# Patient Record
Sex: MALE | Race: WHITE | NOT HISPANIC OR LATINO | Employment: UNEMPLOYED | URBAN - METROPOLITAN AREA
[De-identification: names, ages, dates, MRNs, and addresses within clinical notes are randomized per-mention and may not be internally consistent; named-entity substitution may affect disease eponyms.]

---

## 2019-01-14 ENCOUNTER — TELEPHONE (OUTPATIENT)
Dept: NEUROLOGY | Facility: CLINIC | Age: 39
End: 2019-01-14

## 2019-01-14 NOTE — TELEPHONE ENCOUNTER
----- Message from Mansi Ventura sent at 1/14/2019  2:45 PM CST -----  Contact: pt @ [phone]  Calling to schedule an appt with Dr. Bella. Please call.

## 2019-01-14 NOTE — TELEPHONE ENCOUNTER
Call returned to pt. States he has MS and is not on DMT--last neurology visit was over 3 years ago. Advised pt have most recent notes and MRI brain report faxed to this office. Once received will contact him with an appt. Fax number confirmed.

## 2019-01-30 ENCOUNTER — DOCUMENTATION ONLY (OUTPATIENT)
Dept: NEUROLOGY | Facility: CLINIC | Age: 39
End: 2019-01-30

## 2019-02-12 ENCOUNTER — OFFICE VISIT (OUTPATIENT)
Dept: NEUROLOGY | Facility: CLINIC | Age: 39
End: 2019-02-12
Payer: COMMERCIAL

## 2019-02-12 VITALS
WEIGHT: 178 LBS | HEIGHT: 73 IN | DIASTOLIC BLOOD PRESSURE: 77 MMHG | BODY MASS INDEX: 23.59 KG/M2 | HEART RATE: 59 BPM | SYSTOLIC BLOOD PRESSURE: 116 MMHG

## 2019-02-12 DIAGNOSIS — Z71.89 COUNSELING REGARDING GOALS OF CARE: ICD-10-CM

## 2019-02-12 DIAGNOSIS — R90.89 ABNORMAL FINDING ON MRI OF BRAIN: ICD-10-CM

## 2019-02-12 DIAGNOSIS — G35 MS (MULTIPLE SCLEROSIS): Primary | ICD-10-CM

## 2019-02-12 DIAGNOSIS — G81.90 HEMIPARESIS, UNSPECIFIED HEMIPARESIS ETIOLOGY, UNSPECIFIED LATERALITY: ICD-10-CM

## 2019-02-12 DIAGNOSIS — Z29.89 PROPHYLACTIC IMMUNOTHERAPY: ICD-10-CM

## 2019-02-12 DIAGNOSIS — D84.9 IMMUNOSUPPRESSION: ICD-10-CM

## 2019-02-12 DIAGNOSIS — R26.9 GAIT DISTURBANCE: ICD-10-CM

## 2019-02-12 DIAGNOSIS — G35 MULTIPLE SCLEROSIS EXACERBATION: ICD-10-CM

## 2019-02-12 PROCEDURE — 99999 PR PBB SHADOW E&M-EST. PATIENT-LVL III: CPT | Mod: PBBFAC,,, | Performed by: PSYCHIATRY & NEUROLOGY

## 2019-02-12 PROCEDURE — 99354 PR PROLONGED SVC, OUPT, 1ST HR: ICD-10-PCS | Mod: S$GLB,,, | Performed by: PSYCHIATRY & NEUROLOGY

## 2019-02-12 PROCEDURE — 3008F BODY MASS INDEX DOCD: CPT | Mod: CPTII,S$GLB,, | Performed by: PSYCHIATRY & NEUROLOGY

## 2019-02-12 PROCEDURE — 99205 OFFICE O/P NEW HI 60 MIN: CPT | Mod: S$GLB,,, | Performed by: PSYCHIATRY & NEUROLOGY

## 2019-02-12 PROCEDURE — 99354 PR PROLONGED SVC, OUPT, 1ST HR: CPT | Mod: S$GLB,,, | Performed by: PSYCHIATRY & NEUROLOGY

## 2019-02-12 PROCEDURE — 99205 PR OFFICE/OUTPT VISIT, NEW, LEVL V, 60-74 MIN: ICD-10-PCS | Mod: S$GLB,,, | Performed by: PSYCHIATRY & NEUROLOGY

## 2019-02-12 PROCEDURE — 3008F PR BODY MASS INDEX (BMI) DOCUMENTED: ICD-10-PCS | Mod: CPTII,S$GLB,, | Performed by: PSYCHIATRY & NEUROLOGY

## 2019-02-12 PROCEDURE — 99999 PR PBB SHADOW E&M-EST. PATIENT-LVL III: ICD-10-PCS | Mod: PBBFAC,,, | Performed by: PSYCHIATRY & NEUROLOGY

## 2019-02-12 RX ORDER — DEXAMETHASONE 6 MG/1
TABLET ORAL
Qty: 100 TABLET | Refills: 0 | Status: SHIPPED | OUTPATIENT
Start: 2019-02-12 | End: 2019-09-18 | Stop reason: ALTCHOICE

## 2019-02-12 NOTE — PROGRESS NOTES
"Neurology Consultation    Reason for consult:  MS         History of present illness:   Ms Hinds is a 37 y/o man who is self-referred for MS. He is accompanied by his father.      Pt states his first symptoms were in 2003. Thinks he was bitten by a tick.  States his legs gave out and he had paresthesia right arm. Intermittent over year.  Pt wonders whether he had Lyme.      In November 2009, he develop sudden and severe numbness in both feet and urinary retention.  He went to ER at one of the hospitals in [place], and was admitted for a week.  Had LP and MRIs of brain and spine. His recollection is that the LP was negative. He's not sure if he had lesions anywhere.  He was given diagnosis of TM.  He does not recall whether or not he was given steroids.  Pt remembers that his symptoms "slowly improved" to almost normal.     In December 2013, he developed pain behind the right eye.  He lost 100% vision in the right eye, and was told he had ON.  He does remember whether he was given steroids (but suspects).  He did not see neurologist at that time; two months after this, he developed acute urinary retention, and gait disturbance, so he went to ER again.   He was again admitted to the hospital ([facility] in ) and was there for a week.  At the end of this admission, he was told he had MS.  Two months later, he had another relapse of "total body numbness";  HE states he received steroids at that time.      He did see Denae Tellez in 2014, and actually started Tysabri.  He took one infusion of Tysabri; he felt depressed on this drug and severely fatigued;  He felt Tysabri gave him depression and suicidal thoughts.  He saw Dr Tellez again, and did FDO for Gilenya and he had several side effects, and never took another pill.  He's not been back to see any neurologist since then.      He's not had a relapse since December 2014.  He does admit that he's had a decline in his ability in his walk    His right side is " "weak (both arm and leg).  Walk with support of another person (at times); Wall walks;  Does not want to use a cane or a walker.      He's interested in natural treatments.  Did 21 days of water fasting;  Does dry fasting;  Does Keto diet at times. Also does intermittent fasting.   He's been doing these dietary interventions on and off since 2014.     Taking 10,000 IU of vitamin D3/ day.  CoQ10.   Fish oil. Tumeric.  B12.      He did "stem cell therapy" in [place] in October.   No immunosuppression associated with this.  Mesenchymal fat derived.       In the past, he's done a lot of acting.  Feels he should apply for SSDI.      Current symptoms:    1. Gait disturbance and imbalance  2. Numbness in hands, affects fine motor  3. Fatigue, severe  4. Memory issues  5. depression    Review of systems:   A complete 15 system ROS was completed by the patient, reviewed by me and will uploaded into the EHR.       Past Medical History:   Diagnosis Date    Multiple sclerosis     Transverse myelitis        History reviewed. No pertinent surgical history.    Family History   Problem Relation Age of Onset    Cancer Mother     Stroke Father     Cancer Father     Glaucoma Father     Cancer Maternal Grandmother     Cancer Maternal Grandfather        Social History     Socioeconomic History    Marital status: Single     Spouse name: None    Number of children: None    Years of education: None    Highest education level: None   Social Needs    Financial resource strain: None    Food insecurity - worry: None    Food insecurity - inability: None    Transportation needs - medical: None    Transportation needs - non-medical: None   Occupational History    None   Tobacco Use    Smoking status: None   Substance and Sexual Activity    Alcohol use: None    Drug use: None    Sexual activity: None   Other Topics Concern    None   Social History Narrative    None       MEDS: reviewed    Review of patient's allergies " "indicates:   Allergen Reactions    Cat/feline products          Physical Exam    Vitals:    02/12/19 1035   BP: 116/77   Pulse: (!) 59   Weight: 80.7 kg (178 lb)   Height: 6' 1" (1.854 m)     25 foot timed walk 12.0 seconds; favors right leg; no assist; very ataxic; spastic  In general, the patient is well nourished.    No bruits. He has disc pallor bilaterally    MENTAL STATUS: language is fluent, normal verbal comprehension, short-term and remote memory is intact, attention is normal, patient is alert and oriented x 3, fund of knowlege is appropriate by vocabulary.     CRANIAL NERVE EXAM:  There is no CAROL.  Extraocular muscles are intact. Pupils are equal, round, and reactive to light. No facial asymmetry. Facial sensation is intact bilaterally. There is no dysarthria. Uvula is midline, and palate moves symmetrically. Shoulder shrug intact bilaterlly. Tongue protrusion is midline. Hearing is grossly intact. Neck is supple.     MOTOR EXAM: increased tone in the LE bilaterally; slow RSM both hands, right> left;   4/5 extensors of the UE bilaterally  RLE: 2/5 HF, 4/5 KF, 4/5 DF;   LLE 4+/5 all flexors    REFLEXES: 3+ and symmetric right side; 2+ symmetric on the left side; toes silent bilaterally    SENSORY EXAM: decreased vibration all 4 limbs distally, worse in right side;     COORDINATION: mild dystaxia on FTN bilaterally    GAIT:  As above        IMAGING (personally reviewed):   MRI brain 2/2016 personally reviewed;  Mild to moderate burden; typical of MS; no obvious BS lesions;  Couple of punctate jooj+ lesions.             ASSESSMENT:        1. Multiple sclerosis   2. Gait disturbance  3. Fatigue  4. Depression             DISCUSSION:   The patient's exam shows a significant gait disturbance and right hemiparesis.  He has effectively never been on DMT (only 1 infusion of Tysabri and 1 tablet of Gilenya).  He reports gradual worsening of recent years, but also acute worsening in recent months. I'd like to " conduct MRIs of brain and spine ASAP to explore whether or not he has enhancing lesions, and to get a sense of his total disease burden.  I'll treat with 5 day course of pulse steroids (he chooses oral decadron); The rationale, side effects, risks and expectations of this medication was discussed.  Will also check vitamin D and screening labs and see patient back next week to discuss DMT.          Problem List Items Addressed This Visit     None      Visit Diagnoses     MS (multiple sclerosis)    -  Primary    Relevant Medications    dexamethasone (DECADRON) 6 MG tablet    Other Relevant Orders    CBC auto differential (Completed)    Comprehensive metabolic panel (Completed)    Vinson-Suppressor Ratio (Completed)    STRATIFY JCV ANTIBODY (with Index) (Completed)    Vitamin D (Completed)    MRI Brain Demyelinating W W/O Contrast (Completed)    MRI Cervical Spine Demyelinating W W/O Contrast (Completed)    MRI Thoracic Spine Demyelinating W W/O Contrast (Completed)    Multiple sclerosis exacerbation        Relevant Medications    dexamethasone (DECADRON) 6 MG tablet           Our visit today lasted 90 minutes, and 100% of this time was spent face to face with the patient. Over 50% of this visit included discussion of the treatment plan/medication changes/symptom management/exam findings/imaging results/coordination of care. The patient agrees with the plan of care.       Koki Bella MD, MPH

## 2019-02-14 ENCOUNTER — HOSPITAL ENCOUNTER (OUTPATIENT)
Dept: RADIOLOGY | Facility: HOSPITAL | Age: 39
Discharge: HOME | End: 2019-02-14
Attending: PSYCHIATRY & NEUROLOGY
Payer: COMMERCIAL

## 2019-02-14 DIAGNOSIS — G35 MS (MULTIPLE SCLEROSIS): ICD-10-CM

## 2019-02-14 PROCEDURE — 72156 MRI CERVICAL SPINE DEMYELINATING W W/O CONTRAST: ICD-10-PCS | Mod: 26,,, | Performed by: RADIOLOGY

## 2019-02-14 PROCEDURE — A9585 GADOBUTROL INJECTION: HCPCS | Mod: PO | Performed by: PSYCHIATRY & NEUROLOGY

## 2019-02-14 PROCEDURE — 72157 MRI CHEST SPINE W/O & W/DYE: CPT | Mod: 26,,, | Performed by: RADIOLOGY

## 2019-02-14 PROCEDURE — 25500020 PHARM REV CODE 255: Mod: PO | Performed by: PSYCHIATRY & NEUROLOGY

## 2019-02-14 PROCEDURE — 72157 MRI THORACIC SPINE DEMYELINATING W W/O CONTRAST: ICD-10-PCS | Mod: 26,,, | Performed by: RADIOLOGY

## 2019-02-14 PROCEDURE — 72156 MRI NECK SPINE W/O & W/DYE: CPT | Mod: TC,PO

## 2019-02-14 PROCEDURE — 70553 MRI BRAIN STEM W/O & W/DYE: CPT | Mod: TC,PO

## 2019-02-14 PROCEDURE — 70553 MRI BRAIN DEMYELINATING W/ WO CONTRAST: ICD-10-PCS | Mod: 26,,, | Performed by: RADIOLOGY

## 2019-02-14 PROCEDURE — 72157 MRI CHEST SPINE W/O & W/DYE: CPT | Mod: TC,PO

## 2019-02-14 PROCEDURE — 70553 MRI BRAIN STEM W/O & W/DYE: CPT | Mod: 26,,, | Performed by: RADIOLOGY

## 2019-02-14 PROCEDURE — 72156 MRI NECK SPINE W/O & W/DYE: CPT | Mod: 26,,, | Performed by: RADIOLOGY

## 2019-02-14 RX ORDER — GADOBUTROL 604.72 MG/ML
8 INJECTION INTRAVENOUS
Status: COMPLETED | OUTPATIENT
Start: 2019-02-14 | End: 2019-02-14

## 2019-02-14 RX ADMIN — GADOBUTROL 8 ML: 604.72 INJECTION INTRAVENOUS at 01:02

## 2019-02-19 PROBLEM — R90.89 ABNORMAL FINDING ON MRI OF BRAIN: Status: ACTIVE | Noted: 2019-02-19

## 2019-02-19 PROBLEM — D84.9 IMMUNOSUPPRESSION: Status: ACTIVE | Noted: 2019-02-19

## 2019-02-19 PROBLEM — G81.90 HEMIPARESIS: Status: ACTIVE | Noted: 2019-02-19

## 2019-02-19 PROBLEM — G35 MS (MULTIPLE SCLEROSIS): Status: ACTIVE | Noted: 2019-02-19

## 2019-02-19 PROBLEM — R26.9 GAIT DISTURBANCE: Status: ACTIVE | Noted: 2019-02-19

## 2019-02-19 PROBLEM — Z29.89 PROPHYLACTIC IMMUNOTHERAPY: Status: ACTIVE | Noted: 2019-02-19

## 2019-02-20 ENCOUNTER — OFFICE VISIT (OUTPATIENT)
Dept: NEUROLOGY | Facility: CLINIC | Age: 39
End: 2019-02-20
Payer: COMMERCIAL

## 2019-02-20 VITALS
HEIGHT: 73 IN | SYSTOLIC BLOOD PRESSURE: 122 MMHG | TEMPERATURE: 73 F | BODY MASS INDEX: 24.39 KG/M2 | DIASTOLIC BLOOD PRESSURE: 80 MMHG | WEIGHT: 184 LBS

## 2019-02-20 DIAGNOSIS — Z29.89 PROPHYLACTIC IMMUNOTHERAPY: ICD-10-CM

## 2019-02-20 DIAGNOSIS — R26.9 GAIT DISTURBANCE: ICD-10-CM

## 2019-02-20 DIAGNOSIS — G35 MS (MULTIPLE SCLEROSIS): Primary | ICD-10-CM

## 2019-02-20 DIAGNOSIS — Z71.89 COUNSELING REGARDING GOALS OF CARE: ICD-10-CM

## 2019-02-20 PROCEDURE — 99999 PR PBB SHADOW E&M-EST. PATIENT-LVL III: ICD-10-PCS | Mod: PBBFAC,,, | Performed by: PSYCHIATRY & NEUROLOGY

## 2019-02-20 PROCEDURE — 3008F BODY MASS INDEX DOCD: CPT | Mod: CPTII,S$GLB,, | Performed by: PSYCHIATRY & NEUROLOGY

## 2019-02-20 PROCEDURE — 99999 PR PBB SHADOW E&M-EST. PATIENT-LVL III: CPT | Mod: PBBFAC,,, | Performed by: PSYCHIATRY & NEUROLOGY

## 2019-02-20 PROCEDURE — 99215 OFFICE O/P EST HI 40 MIN: CPT | Mod: S$GLB,,, | Performed by: PSYCHIATRY & NEUROLOGY

## 2019-02-20 PROCEDURE — 99215 PR OFFICE/OUTPT VISIT, EST, LEVL V, 40-54 MIN: ICD-10-PCS | Mod: S$GLB,,, | Performed by: PSYCHIATRY & NEUROLOGY

## 2019-02-20 PROCEDURE — 3008F PR BODY MASS INDEX (BMI) DOCUMENTED: ICD-10-PCS | Mod: CPTII,S$GLB,, | Performed by: PSYCHIATRY & NEUROLOGY

## 2019-02-20 NOTE — PROGRESS NOTES
"Subjective:       Patient ID: Larry Hinds is a 38 y.o. male who presents today for a routine clinic visit for MS.  Pt is accompanied by his father    MS HPI:  · DMT: None  · Taking vitamin D3 as recommended?   · Has taken 4 of the 5 doses of the steroids; having insomnia; maybe is experiencing the improvement in walking;  Feels its due to "DMSO" cream that he started taking a few days prior to the decadron.     SOCIAL HISTORY  Social History     Tobacco Use    Smoking status: Never Smoker    Smokeless tobacco: Never Used   Substance Use Topics    Alcohol use: No     Frequency: Never    Drug use: No     Living arrangements - the patient lives alone.  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  No new or enhancing lesions to suggest active disease.      Electronically signed by: Girma Higginbotham MD  Date:    02/14/2019  Time:    14:18     Results for orders placed during the hospital encounter of 02/14/19   MRI Cervical Spine Demyelinating W W/O Contrast    Impression Multifocal signal abnormality throughout the length of the cervical and upper thoracic cord, in keeping with demyelinating plaque in this patient with known history of demyelinating disease.  Some cord atrophy also noted from the levels of C3-4 through C4-5.  No abnormal enhancement to suggest active demyelination.    Multilevel degenerative disc disease contributing to multilevel spinal canal or neural foraminal stenosis, most severe at the levels of C5-6 and C6-7.      Electronically signed by: Girma Higginbotham MD  Date:    02/14/2019  Time:    14:49     Results " for orders placed during the hospital encounter of 02/14/19   MRI Thoracic Spine Demyelinating W W/O Contrast    Impression Multifocal signal abnormality throughout the thoracic cord, in keeping with demyelinating plaque in this patient with known history of demyelinating disease.    2 small foci of enhancement noted at the level of T6 and T6-7, concerning for active sites of demyelination.      Electronically signed by: Girma Higginbotham MD  Date:    02/14/2019  Time:    15:08         Labs:     Lab Results   Component Value Date    NTZWSDWJ43VH 50 02/12/2019     Lab Results   Component Value Date    JCVINDEX 2.91 (A) 02/12/2019    JCVANTIBODY Positive (A) 02/12/2019     Lab Results   Component Value Date    YY1WKRRX 73.0 02/12/2019    ABSOLUTECD3 1547 02/12/2019    MI7ANCDF 21.9 02/12/2019    ABSOLUTECD8 465 02/12/2019    VG3QMCWS 46.9 02/12/2019    ABSOLUTECD4 994 02/12/2019    LABCD48 2.14 02/12/2019     Lab Results   Component Value Date    WBC 6.55 02/12/2019    RBC 5.31 02/12/2019    HGB 15.9 02/12/2019    HCT 46.6 02/12/2019    MCV 88 02/12/2019    MCH 29.9 02/12/2019    MCHC 34.1 02/12/2019    RDW 11.3 (L) 02/12/2019     02/12/2019    MPV 10.0 02/12/2019    GRAN 3.6 02/12/2019    GRAN 54.5 02/12/2019    LYMPH 2.0 02/12/2019    LYMPH 30.1 02/12/2019    MONO 0.5 02/12/2019    MONO 7.9 02/12/2019    EOS 0.4 02/12/2019    BASO 0.09 02/12/2019    EOSINOPHIL 5.8 02/12/2019    BASOPHIL 1.4 02/12/2019     Sodium   Date Value Ref Range Status   02/12/2019 140 136 - 145 mmol/L Final     Potassium   Date Value Ref Range Status   02/12/2019 4.2 3.5 - 5.1 mmol/L Final     Chloride   Date Value Ref Range Status   02/12/2019 102 95 - 110 mmol/L Final     CO2   Date Value Ref Range Status   02/12/2019 28 23 - 29 mmol/L Final     Glucose   Date Value Ref Range Status   02/12/2019 97 70 - 110 mg/dL Final     BUN, Bld   Date Value Ref Range Status   02/12/2019 17 6 - 20 mg/dL Final     Creatinine   Date Value Ref Range  Status   02/12/2019 1.2 0.5 - 1.4 mg/dL Final     Calcium   Date Value Ref Range Status   02/12/2019 10.2 8.7 - 10.5 mg/dL Final     Total Protein   Date Value Ref Range Status   02/12/2019 8.3 6.0 - 8.4 g/dL Final     Albumin   Date Value Ref Range Status   02/12/2019 4.5 3.5 - 5.2 g/dL Final     Total Bilirubin   Date Value Ref Range Status   02/12/2019 0.6 0.1 - 1.0 mg/dL Final     Comment:     For infants and newborns, interpretation of results should be based  on gestational age, weight and in agreement with clinical  observations.  Premature Infant recommended reference ranges:  Up to 24 hours.............<8.0 mg/dL  Up to 48 hours............<12.0 mg/dL  3-5 days..................<15.0 mg/dL  6-29 days.................<15.0 mg/dL       Alkaline Phosphatase   Date Value Ref Range Status   02/12/2019 58 55 - 135 U/L Final     AST   Date Value Ref Range Status   02/12/2019 13 10 - 40 U/L Final     ALT   Date Value Ref Range Status   02/12/2019 16 10 - 44 U/L Final     Anion Gap   Date Value Ref Range Status   02/12/2019 10 8 - 16 mmol/L Final     eGFR if    Date Value Ref Range Status   02/12/2019 >60.0 >60 mL/min/1.73 m^2 Final     eGFR if non    Date Value Ref Range Status   02/12/2019 >60.0 >60 mL/min/1.73 m^2 Final     Comment:     Calculation used to obtain the estimated glomerular filtration  rate (eGFR) is the CKD-EPI equation.            Diagnosis/Assessment/Plan:    1. Multiple Sclerosis  · Assessment: MRIs show two enhancing lesions in T spine; Gait much improved after steroid  · Imaging: will be planned 6 mo after start of Ocrevus  · Disease Modifying Therapies: after shared decision making, will start Ocrevus; The rationale, side effects, risks and expectations of this medication was discussed, and pt expresses understanding.  He refuses recommendation to get vaccines from ID but is willing to get screening testing for titers. The patient was counseled about the risks  associated with immune suppressive therapy, including a higher risk of serious infections and malignancy, as well as the importance of avoiding all live virus vaccines while on immune suppressive medication.  Will dose in [place].  Pt advised to take 3K/day of vitamin D3; The the patient was counseled about the importance of vitamin D supplementation in multiple sclerosis, and the fact that recent data suggests that high circulating blood levels of vitamin D has an ameliorating effect on the disease course in multiple sclerosis in general.      2. MS Symptom Assessment / Management  · Gait Disturbance:  PT ordered at [facility] in [place]    · Adaptive needs: I've reached out to our LCSW Suri Flores to connect with patient about his disability status    F/u 10 week with Nelly Fernandez CNS      Over 50% of this 40 minute visit was spent in direct face to face counseling of the patient about MS, DMT considerations, and MS symptom management.       Problem List Items Addressed This Visit        1 - High    MS (multiple sclerosis) - Primary    Relevant Orders    Ambulatory Referral to Physical/Occupational Therapy    Hepatitis B surface antibody    Hepatitis A antibody, IgG    Hepatitis C antibody    Hepatitis B surface antigen    HIV 1/2 Ag/Ab (4th Gen)    Quantiferon Gold TB    RPR    Varicella zoster antibody, IgG    Hepatitis B surface antibody    STRONGYLOIDES IGG ANTIBODIES       3     Gait disturbance    Relevant Orders    Ambulatory Referral to Physical/Occupational Therapy

## 2019-02-20 NOTE — Clinical Note
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 thanks"

## 2019-02-20 NOTE — Clinical Note
Team, new pt to our clinic; starting Ocrevus in [place];  He defers vaccines; checking Hep B today;  Kristin Nicholas;  He signed paperwork;

## 2019-02-21 ENCOUNTER — LAB VISIT (OUTPATIENT)
Dept: LAB | Facility: HOSPITAL | Age: 39
End: 2019-02-21
Attending: PSYCHIATRY & NEUROLOGY
Payer: COMMERCIAL

## 2019-02-21 DIAGNOSIS — G35 MS (MULTIPLE SCLEROSIS): ICD-10-CM

## 2019-02-21 PROCEDURE — 86682 HELMINTH ANTIBODY: CPT

## 2019-02-21 PROCEDURE — 86592 SYPHILIS TEST NON-TREP QUAL: CPT

## 2019-02-21 PROCEDURE — 86703 HIV-1/HIV-2 1 RESULT ANTBDY: CPT

## 2019-02-21 PROCEDURE — 86787 VARICELLA-ZOSTER ANTIBODY: CPT

## 2019-02-21 PROCEDURE — 86706 HEP B SURFACE ANTIBODY: CPT

## 2019-02-21 PROCEDURE — 86790 VIRUS ANTIBODY NOS: CPT

## 2019-02-21 PROCEDURE — 86803 HEPATITIS C AB TEST: CPT

## 2019-02-21 PROCEDURE — 87340 HEPATITIS B SURFACE AG IA: CPT

## 2019-02-22 LAB
HBV SURFACE AB SER-ACNC: NEGATIVE M[IU]/ML
HBV SURFACE AB SER-ACNC: NEGATIVE M[IU]/ML
HBV SURFACE AG SERPL QL IA: NEGATIVE
HCV AB SERPL QL IA: NEGATIVE
HEPATITIS A ANTIBODY, IGG: NEGATIVE
HIV 1+2 AB+HIV1 P24 AG SERPL QL IA: NEGATIVE
RPR SER QL: NORMAL

## 2019-02-23 LAB
VARICELLA INTERPRETATION: POSITIVE
VARICELLA ZOSTER IGG: 2.23 ISR

## 2019-02-25 LAB — STRONGYLOIDES ANTIBODY IGG: NEGATIVE

## 2019-02-26 ENCOUNTER — TELEPHONE (OUTPATIENT)
Dept: NEUROLOGY | Facility: CLINIC | Age: 39
End: 2019-02-26

## 2019-02-26 DIAGNOSIS — Z79.899 HIGH RISK MEDICATION USE: ICD-10-CM

## 2019-02-26 DIAGNOSIS — G35 MULTIPLE SCLEROSIS: Primary | ICD-10-CM

## 2019-02-26 NOTE — TELEPHONE ENCOUNTER
Call received from lab. TB gold must be re-drawn--tube was under filled and unable to be processed.

## 2019-02-28 ENCOUNTER — TELEPHONE (OUTPATIENT)
Dept: NEUROLOGY | Facility: CLINIC | Age: 39
End: 2019-02-28

## 2019-02-28 ENCOUNTER — PATIENT MESSAGE (OUTPATIENT)
Dept: NEUROLOGY | Facility: CLINIC | Age: 39
End: 2019-02-28

## 2019-02-28 ENCOUNTER — LAB VISIT (OUTPATIENT)
Dept: LAB | Facility: HOSPITAL | Age: 39
End: 2019-02-28
Attending: CLINICAL NURSE SPECIALIST
Payer: COMMERCIAL

## 2019-02-28 ENCOUNTER — OFFICE VISIT (OUTPATIENT)
Dept: INTERNAL MEDICINE | Facility: CLINIC | Age: 39
End: 2019-02-28
Payer: COMMERCIAL

## 2019-02-28 VITALS
SYSTOLIC BLOOD PRESSURE: 110 MMHG | WEIGHT: 177 LBS | TEMPERATURE: 97 F | RESPIRATION RATE: 18 BRPM | BODY MASS INDEX: 23.36 KG/M2 | OXYGEN SATURATION: 97 % | HEART RATE: 87 BPM | DIASTOLIC BLOOD PRESSURE: 80 MMHG

## 2019-02-28 DIAGNOSIS — R26.9 IMPAIRED GAIT: Primary | ICD-10-CM

## 2019-02-28 DIAGNOSIS — R29.898 RIGHT LEG WEAKNESS: ICD-10-CM

## 2019-02-28 DIAGNOSIS — G35 MULTIPLE SCLEROSIS: ICD-10-CM

## 2019-02-28 DIAGNOSIS — Z79.899 HIGH RISK MEDICATION USE: ICD-10-CM

## 2019-02-28 DIAGNOSIS — Z76.89 ENCOUNTER TO ESTABLISH CARE: Primary | ICD-10-CM

## 2019-02-28 DIAGNOSIS — Z13.220 SCREENING FOR HYPERLIPIDEMIA: ICD-10-CM

## 2019-02-28 DIAGNOSIS — G35 MS (MULTIPLE SCLEROSIS): ICD-10-CM

## 2019-02-28 PROCEDURE — 99999 PR PBB SHADOW E&M-EST. PATIENT-LVL III: ICD-10-PCS | Mod: PBBFAC,,, | Performed by: FAMILY MEDICINE

## 2019-02-28 PROCEDURE — 3008F BODY MASS INDEX DOCD: CPT | Mod: CPTII,S$GLB,, | Performed by: FAMILY MEDICINE

## 2019-02-28 PROCEDURE — 99999 PR PBB SHADOW E&M-EST. PATIENT-LVL III: CPT | Mod: PBBFAC,,, | Performed by: FAMILY MEDICINE

## 2019-02-28 PROCEDURE — 99203 PR OFFICE/OUTPT VISIT, NEW, LEVL III, 30-44 MIN: ICD-10-PCS | Mod: S$GLB,,, | Performed by: FAMILY MEDICINE

## 2019-02-28 PROCEDURE — 36415 COLL VENOUS BLD VENIPUNCTURE: CPT

## 2019-02-28 PROCEDURE — 86480 TB TEST CELL IMMUN MEASURE: CPT

## 2019-02-28 PROCEDURE — 99203 OFFICE O/P NEW LOW 30 MIN: CPT | Mod: S$GLB,,, | Performed by: FAMILY MEDICINE

## 2019-02-28 PROCEDURE — 3008F PR BODY MASS INDEX (BMI) DOCUMENTED: ICD-10-PCS | Mod: CPTII,S$GLB,, | Performed by: FAMILY MEDICINE

## 2019-02-28 RX ORDER — IBUPROFEN 100 MG/5ML
1000 SUSPENSION, ORAL (FINAL DOSE FORM) ORAL
COMMUNITY

## 2019-02-28 RX ORDER — MELATONIN 10 MG
TABLET, SUBLINGUAL SUBLINGUAL
COMMUNITY

## 2019-02-28 RX ORDER — LANOLIN ALCOHOL/MO/W.PET/CERES
1000 CREAM (GRAM) TOPICAL
COMMUNITY
End: 2023-01-06

## 2019-02-28 RX ORDER — MULTIVITAMIN
1 TABLET ORAL
COMMUNITY

## 2019-02-28 RX ORDER — OMEGA-3-ACID ETHYL ESTERS 1 G/1
1 CAPSULE, LIQUID FILLED ORAL
COMMUNITY

## 2019-02-28 NOTE — PROGRESS NOTES
Subjective:       Patient ID: Larry Hinds is a 38 y.o. male.    Chief Complaint: Establish Care    HPI      37 yo M    MS  Transverse myelitis - 2009    Neurologist -   Katerine Hawkins - [facility].    Has seen a Stem Cell MD  In [place] - Dr. Caro Talbot     Never smoked  Alcohol - occasional  No drugs    Working to get on disability.    Reviewed labs        F.H.  Cancer  - breast, leukemia ( uncertain ), esophageal - asbestosis  Father - Stroke in Sept / CAD -   Mother - Breast Cancer    Review of Systems   Constitutional: Negative for activity change.   Eyes: Negative for discharge.   Respiratory: Negative for wheezing.    Cardiovascular: Negative for chest pain and palpitations.   Gastrointestinal: Negative for constipation, diarrhea and vomiting.   Genitourinary: Positive for difficulty urinating. Negative for hematuria.   Neurological: Positive for headaches.   Psychiatric/Behavioral: Positive for dysphoric mood.       Objective:       Vitals:    02/28/19 1116   BP: 110/80   Pulse: 87   Resp: 18   Temp: 97 °F (36.1 °C)       Physical Exam   Constitutional: He is oriented to person, place, and time. He appears well-developed and well-nourished. No distress.   HENT:   Head: Normocephalic and atraumatic.   Right Ear: Hearing, tympanic membrane, external ear and ear canal normal.   Left Ear: Hearing, tympanic membrane, external ear and ear canal normal.   Nose: Nose normal. Right sinus exhibits no maxillary sinus tenderness and no frontal sinus tenderness. Left sinus exhibits no maxillary sinus tenderness and no frontal sinus tenderness.   Mouth/Throat: Uvula is midline, oropharynx is clear and moist and mucous membranes are normal.   Eyes: Conjunctivae are normal. Right eye exhibits no discharge. Left eye exhibits no discharge.   Neck: Trachea normal, normal range of motion and full passive range of motion without pain.   Pulmonary/Chest: Effort normal and breath sounds normal. No respiratory distress. He has no  decreased breath sounds. He has no wheezes.   Abdominal: Normal appearance.   Musculoskeletal: Normal range of motion. He exhibits no edema or deformity.   In wheelchair   Neurological: He is alert and oriented to person, place, and time.   Skin: Skin is warm, dry and intact. No rash noted. No erythema. No pallor.   Psychiatric: He has a normal mood and affect. His speech is normal and behavior is normal. Thought content normal.       Assessment:       1. Encounter to establish care    2. MS (multiple sclerosis)    3. Screening for hyperlipidemia        Plan:   Encounter to establish care    Reviewed labs  Discussed treatment plans extensively  Concerned about mood  Offered to return if wants to further discuss  Declines psych for now    MS  Following Neurology  Upcoming infusion therapy         Screening for HLD  Lipid panel    6 mo f/u     No Follow-up on file.

## 2019-03-01 ENCOUNTER — TELEPHONE (OUTPATIENT)
Dept: PSYCHIATRY | Facility: CLINIC | Age: 39
End: 2019-03-01

## 2019-03-04 ENCOUNTER — TELEPHONE (OUTPATIENT)
Dept: NEUROLOGY | Facility: CLINIC | Age: 39
End: 2019-03-04

## 2019-03-04 DIAGNOSIS — G35 MS (MULTIPLE SCLEROSIS): Primary | ICD-10-CM

## 2019-03-04 LAB
M TB IFN-G CD4+ BCKGRND COR BLD-ACNC: 0.03 IU/ML
MITOGEN IGNF BCKGRD COR BLD-ACNC: >10 IU/ML
MITOGEN IGNF BCKGRD COR BLD-ACNC: NEGATIVE [IU]/ML
NIL: 0.05 IU/ML
TB2 - NIL: 0.04 IU/ML

## 2019-03-06 ENCOUNTER — TELEPHONE (OUTPATIENT)
Dept: PSYCHIATRY | Facility: CLINIC | Age: 39
End: 2019-03-06

## 2019-03-06 ENCOUNTER — PATIENT MESSAGE (OUTPATIENT)
Dept: NEUROLOGY | Facility: CLINIC | Age: 39
End: 2019-03-06

## 2019-03-07 ENCOUNTER — TELEPHONE (OUTPATIENT)
Dept: PSYCHIATRY | Facility: CLINIC | Age: 39
End: 2019-03-07

## 2019-03-07 NOTE — TELEPHONE ENCOUNTER
Spoke with pt by phone.  We scheduled a phone call next week 3/13/19 at 2pm to complete his SSA disability application.  SW spoke with pt about Medicaid and [employer] [place] but he did not seem interested in either despite having no income.

## 2019-03-08 ENCOUNTER — PATIENT MESSAGE (OUTPATIENT)
Dept: NEUROLOGY | Facility: CLINIC | Age: 39
End: 2019-03-08

## 2019-03-08 RX ORDER — FAMOTIDINE 10 MG/ML
20 INJECTION INTRAVENOUS
Status: CANCELLED | OUTPATIENT
Start: 2019-03-08

## 2019-03-08 RX ORDER — ACETAMINOPHEN 325 MG/1
1000 TABLET ORAL
Status: CANCELLED | OUTPATIENT
Start: 2019-03-08

## 2019-03-08 RX ORDER — HEPARIN 100 UNIT/ML
100 SYRINGE INTRAVENOUS
Status: CANCELLED | OUTPATIENT
Start: 2019-03-08

## 2019-03-08 RX ORDER — SODIUM CHLORIDE 0.9 % (FLUSH) 0.9 %
10 SYRINGE (ML) INJECTION
Status: CANCELLED | OUTPATIENT
Start: 2019-03-08

## 2019-03-12 ENCOUNTER — LAB VISIT (OUTPATIENT)
Dept: LAB | Facility: HOSPITAL | Age: 39
End: 2019-03-12
Attending: PSYCHIATRY & NEUROLOGY
Payer: COMMERCIAL

## 2019-03-12 DIAGNOSIS — G35 MS (MULTIPLE SCLEROSIS): ICD-10-CM

## 2019-03-12 PROCEDURE — 86704 HEP B CORE ANTIBODY TOTAL: CPT

## 2019-03-12 PROCEDURE — 36415 COLL VENOUS BLD VENIPUNCTURE: CPT

## 2019-03-13 ENCOUNTER — TELEPHONE (OUTPATIENT)
Dept: PSYCHIATRY | Facility: CLINIC | Age: 39
End: 2019-03-13

## 2019-03-13 LAB — HBV CORE AB SERPL QL IA: NEGATIVE

## 2019-03-13 NOTE — TELEPHONE ENCOUNTER
Assisted pt by phone to complete a portion of his online SSDI/SSI application.  We spent ~ 2 hours by phone and will resume our efforts on Monday, 3/18 at 2pm.

## 2019-03-18 ENCOUNTER — TELEPHONE (OUTPATIENT)
Dept: PSYCHIATRY | Facility: CLINIC | Age: 39
End: 2019-03-18

## 2019-03-18 NOTE — TELEPHONE ENCOUNTER
Assisted pt by phone to complete his SSDI/SSI application online.  Mailed copy to patient with instructions on next steps.

## 2019-03-19 ENCOUNTER — PATIENT MESSAGE (OUTPATIENT)
Dept: NEUROLOGY | Facility: CLINIC | Age: 39
End: 2019-03-19

## 2019-03-26 ENCOUNTER — INFUSION (OUTPATIENT)
Dept: INFUSION THERAPY | Facility: HOSPITAL | Age: 39
End: 2019-03-26
Attending: PSYCHIATRY & NEUROLOGY
Payer: COMMERCIAL

## 2019-03-26 ENCOUNTER — DOCUMENTATION ONLY (OUTPATIENT)
Dept: HEMATOLOGY/ONCOLOGY | Facility: CLINIC | Age: 39
End: 2019-03-26

## 2019-03-26 VITALS
BODY MASS INDEX: 23.86 KG/M2 | OXYGEN SATURATION: 98 % | DIASTOLIC BLOOD PRESSURE: 61 MMHG | HEART RATE: 76 BPM | WEIGHT: 180 LBS | HEIGHT: 73 IN | TEMPERATURE: 97 F | SYSTOLIC BLOOD PRESSURE: 110 MMHG | RESPIRATION RATE: 18 BRPM

## 2019-03-26 DIAGNOSIS — G35 MS (MULTIPLE SCLEROSIS): Primary | ICD-10-CM

## 2019-03-26 PROCEDURE — 25000003 PHARM REV CODE 250: Performed by: PSYCHIATRY & NEUROLOGY

## 2019-03-26 PROCEDURE — 96367 TX/PROPH/DG ADDL SEQ IV INF: CPT

## 2019-03-26 PROCEDURE — 63600175 PHARM REV CODE 636 W HCPCS: Performed by: PSYCHIATRY & NEUROLOGY

## 2019-03-26 PROCEDURE — 96366 THER/PROPH/DIAG IV INF ADDON: CPT

## 2019-03-26 PROCEDURE — 96375 TX/PRO/DX INJ NEW DRUG ADDON: CPT

## 2019-03-26 PROCEDURE — S0028 INJECTION, FAMOTIDINE, 20 MG: HCPCS | Performed by: PSYCHIATRY & NEUROLOGY

## 2019-03-26 PROCEDURE — 96365 THER/PROPH/DIAG IV INF INIT: CPT

## 2019-03-26 RX ORDER — FAMOTIDINE 10 MG/ML
20 INJECTION INTRAVENOUS
Status: CANCELLED | OUTPATIENT
Start: 2019-03-26

## 2019-03-26 RX ORDER — METHYLPREDNISOLONE SOD SUCC 125 MG
100 VIAL (EA) INJECTION
Status: CANCELLED
Start: 2019-03-26

## 2019-03-26 RX ORDER — HEPARIN 100 UNIT/ML
100 SYRINGE INTRAVENOUS
Status: CANCELLED | OUTPATIENT
Start: 2019-03-26

## 2019-03-26 RX ORDER — FAMOTIDINE 10 MG/ML
20 INJECTION INTRAVENOUS
Status: COMPLETED | OUTPATIENT
Start: 2019-03-26 | End: 2019-03-26

## 2019-03-26 RX ORDER — METHYLPREDNISOLONE SOD SUCC 125 MG
100 VIAL (EA) INJECTION
Status: COMPLETED | OUTPATIENT
Start: 2019-03-26 | End: 2019-03-26

## 2019-03-26 RX ORDER — SODIUM CHLORIDE 0.9 % (FLUSH) 0.9 %
10 SYRINGE (ML) INJECTION
Status: CANCELLED | OUTPATIENT
Start: 2019-03-26

## 2019-03-26 RX ORDER — ACETAMINOPHEN 500 MG
1000 TABLET ORAL
Status: CANCELLED | OUTPATIENT
Start: 2019-03-26

## 2019-03-26 RX ORDER — ACETAMINOPHEN 500 MG
1000 TABLET ORAL
Status: COMPLETED | OUTPATIENT
Start: 2019-03-26 | End: 2019-03-26

## 2019-03-26 RX ADMIN — DIPHENHYDRAMINE HYDROCHLORIDE 50 MG: 50 INJECTION, SOLUTION INTRAMUSCULAR; INTRAVENOUS at 10:03

## 2019-03-26 RX ADMIN — FAMOTIDINE 20 MG: 10 INJECTION, SOLUTION INTRAVENOUS at 10:03

## 2019-03-26 RX ADMIN — METHYLPREDNISOLONE SODIUM SUCCINATE 100 MG: 125 INJECTION, POWDER, FOR SOLUTION INTRAMUSCULAR; INTRAVENOUS at 10:03

## 2019-03-26 RX ADMIN — OCRELIZUMAB 300 MG: 300 INJECTION INTRAVENOUS at 11:03

## 2019-03-26 RX ADMIN — ACETAMINOPHEN 1000 MG: 500 TABLET ORAL at 10:03

## 2019-03-26 NOTE — PLAN OF CARE
Problem: Fall Injury Risk  Goal: Absence of Fall and Fall-Related Injury    Intervention: Promote Injury-Free Environment  Pt cautious upon ambulation and instructed to call for assistance when needed

## 2019-03-26 NOTE — PLAN OF CARE
"Problem: Adult Inpatient Plan of Care  Goal: Plan of Care Review  Outcome: Ongoing (interventions implemented as appropriate)  Pt states, "I feel tired and I haven't felt my hands in years, I have lots of numbness and tingling"      "

## 2019-03-26 NOTE — DISCHARGE INSTRUCTIONS
.  [facility]  [address] [address]  [address]  [phone] phone     [phone] fax  Hours of Operation: Monday- Friday 8:00am- 5:00pm  After hours phone  [phone]  Hematology / Oncology Physicians on call      Dr. Fredo Farmer    Please call with any concerns regarding your appointment today.  .FALL PREVENTION   Falls often occur due to slipping, tripping or losing your balance. Here are ways to reduce your risk of falling again.   Was there anything that caused your fall that can be fixed, removed or replaced?   Make your home safe by keeping walkways clear of objects you may trip over.   Use non-slip pads under rugs.   Do not walk in poorly lit areas.   Do not stand on chairs or wobbly ladders.   Use caution when reaching overhead or looking upward. This position can cause a loss of balance.   Be sure your shoes fit properly, have non-slip bottoms and are in good condition.   Be cautious when going up and down stairs, curbs, and when walking on uneven sidewalks.   If your balance is poor, consider using a cane or walker.   If your fall was related to alcohol use, stop or limit alcohol intake.   If your fall was related to use of sleeping medicines, talk to your doctor about this. You may need to reduce your dosage at bedtime if you awaken during the night to go to the bathroom.   To reduce the need for nighttime bathroom trips:   Avoid drinking fluids for several hours before going to bed   Empty your bladder before going to bed   Men can keep a urinal at the bedside   © 2073-9933 [employer], [address] [place] [id number]. All rights reserved. This information is not intended as a substitute for professional medical care. Always follow your healthcare professional's instructions.    .WAYS TO HELP PREVENT INFECTION         WASH YOUR HANDS OFTEN DURING THE DAY, ESPECIALLY BEFORE YOU EAT, AFTER USING  "THE BATHROOM, AND AFTER TOUCHING ANIMALS     STAY AWAY FROM PEOPLE WHO HAVE ILLNESSES YOU CAN CATCH; SUCH AS COLDS, FLU, CHICKEN POX     TRY TO AVOID CROWDS     STAY AWAY FROM CHILDREN WHO RECENTLY HAVE RECEIVED LIVE VIRUS VACCINES     MAINTAIN GOOD MOUTH CARE     DO NOT SQUEEZE OR SCRATCH PIMPLES     CLEAN CUTS & SCRAPES RIGHT AWAY AND DAILY UNTIL HEALED WITH WARM WATER, SOAP & AN ANTISEPTIC     AVOID CONTACT WITH LITTER BOXES, BIRD CAGES, & FISH TANKS     AVOID STANDING WATER, IE., BIRD BATHS, FLOWER POTS/VASES, OR HUMIDIFIERS     WEAR GLOVES WHEN GARDENING OR CLEANING UP AFTER OTHERS, ESPECIALLY BABIES & SMALL CHILDREN     DO NOT EAT RAW FISH, SEAFOOD, MEAT, OR EGGS      .Support Groups/Classes    Support groups and classes are being offered at the   [facility] and [facility]!!    "Cooking with Cancer" (Nutrition Class):  Second Wednesday of each month   at noon at the [facility].  Metastatic Support Group:  Third Tuesday of each month   at noon at the [facility].  Next Steps Class/Group: Second and fourth Thursday of each month at noon at the [facility].  [facility] (Breast Cancer Support Group): First Tuesday of each month   at 5:30pm at the [facility] ER location.  [employer]: [facility]: Second and third Tuesday of each month from 7:30am - 2pm.  [facility]: First and fourth Tuesday of each month from 7:30am - 2pm    If you are interested in attending or would like more information please ask our social workers or your nurse!    "

## 2019-03-26 NOTE — PLAN OF CARE
Problem: Fall Injury Risk  Goal: Absence of Fall and Fall-Related Injury    Intervention: Identify and Manage Contributors to Fall Injury Risk  Reviewed fall risk associated with medications with pt

## 2019-03-26 NOTE — PROGRESS NOTES
SW received notification from infusion that pt reported feeling down and depressed nearly every day. SW attempted contact twice today during pt's infusion, but he was asleep both times. CAMERON left her contact info along with important numbers such as 9-1-1, [employer] [place] [phone]; Suicide Prevention Lifeline 6-449-102-TALK; Suicide Hotline 1-746.455.4265; Self-Injury Crisis Line 8-152-943-HELP. CAMERON also noticed pt had been contacted by LCSW in Psychiatry in [place]. SW will reach out to LCSW to make sure she is aware of pt's report today (in case she wants to reach out since they may have established a rapport). Cameron will f/u later this week depending if LCSW in [place] would prefer to address this concern with pt.

## 2019-03-26 NOTE — PLAN OF CARE
Problem: Adult Inpatient Plan of Care  Goal: Patient-Specific Goal (Individualization)  Outcome: Ongoing (interventions implemented as appropriate)  Pt likes miguel

## 2019-03-27 ENCOUNTER — TELEPHONE (OUTPATIENT)
Dept: PSYCHIATRY | Facility: CLINIC | Age: 39
End: 2019-03-27

## 2019-03-27 ENCOUNTER — PATIENT MESSAGE (OUTPATIENT)
Dept: PSYCHIATRY | Facility: CLINIC | Age: 39
End: 2019-03-27

## 2019-03-27 NOTE — TELEPHONE ENCOUNTER
Received message to contact Cathy Ruffin LMSW with [facility] team.  Reviewed her Epic note and left a message for her to call.  MS team is aware of pt's depression and is able to assist with referral to therapist/psychiatrist if pt is open to this.  Will await call back from Cathy with information on local resources that may be available to pt.

## 2019-04-08 ENCOUNTER — PATIENT MESSAGE (OUTPATIENT)
Dept: PSYCHIATRY | Facility: CLINIC | Age: 39
End: 2019-04-08

## 2019-04-09 ENCOUNTER — SOCIAL WORK (OUTPATIENT)
Dept: HEMATOLOGY/ONCOLOGY | Facility: CLINIC | Age: 39
End: 2019-04-09

## 2019-04-09 ENCOUNTER — INFUSION (OUTPATIENT)
Dept: INFUSION THERAPY | Facility: HOSPITAL | Age: 39
End: 2019-04-09
Attending: PSYCHIATRY & NEUROLOGY
Payer: COMMERCIAL

## 2019-04-09 VITALS
DIASTOLIC BLOOD PRESSURE: 63 MMHG | SYSTOLIC BLOOD PRESSURE: 96 MMHG | TEMPERATURE: 97 F | HEART RATE: 69 BPM | RESPIRATION RATE: 18 BRPM | OXYGEN SATURATION: 98 %

## 2019-04-09 DIAGNOSIS — G35 MS (MULTIPLE SCLEROSIS): Primary | ICD-10-CM

## 2019-04-09 PROCEDURE — 96367 TX/PROPH/DG ADDL SEQ IV INF: CPT

## 2019-04-09 PROCEDURE — S0028 INJECTION, FAMOTIDINE, 20 MG: HCPCS | Performed by: PSYCHIATRY & NEUROLOGY

## 2019-04-09 PROCEDURE — 25000003 PHARM REV CODE 250: Performed by: PSYCHIATRY & NEUROLOGY

## 2019-04-09 PROCEDURE — 96375 TX/PRO/DX INJ NEW DRUG ADDON: CPT

## 2019-04-09 PROCEDURE — 63600175 PHARM REV CODE 636 W HCPCS: Performed by: PSYCHIATRY & NEUROLOGY

## 2019-04-09 PROCEDURE — 96365 THER/PROPH/DIAG IV INF INIT: CPT

## 2019-04-09 PROCEDURE — 96366 THER/PROPH/DIAG IV INF ADDON: CPT

## 2019-04-09 RX ORDER — SODIUM CHLORIDE 0.9 % (FLUSH) 0.9 %
10 SYRINGE (ML) INJECTION
Status: CANCELLED | OUTPATIENT
Start: 2019-09-10

## 2019-04-09 RX ORDER — HEPARIN 100 UNIT/ML
100 SYRINGE INTRAVENOUS
Status: CANCELLED | OUTPATIENT
Start: 2019-09-10

## 2019-04-09 RX ORDER — ACETAMINOPHEN 500 MG
1000 TABLET ORAL
Status: COMPLETED | OUTPATIENT
Start: 2019-04-09 | End: 2019-04-09

## 2019-04-09 RX ORDER — FAMOTIDINE 10 MG/ML
20 INJECTION INTRAVENOUS
Status: CANCELLED | OUTPATIENT
Start: 2019-09-10

## 2019-04-09 RX ORDER — FAMOTIDINE 10 MG/ML
20 INJECTION INTRAVENOUS
Status: COMPLETED | OUTPATIENT
Start: 2019-04-09 | End: 2019-04-09

## 2019-04-09 RX ORDER — METHYLPREDNISOLONE SOD SUCC 125 MG
100 VIAL (EA) INJECTION
Status: COMPLETED | OUTPATIENT
Start: 2019-04-09 | End: 2019-04-09

## 2019-04-09 RX ORDER — SODIUM CHLORIDE 0.9 % (FLUSH) 0.9 %
10 SYRINGE (ML) INJECTION
Status: DISCONTINUED | OUTPATIENT
Start: 2019-04-09 | End: 2019-04-09 | Stop reason: HOSPADM

## 2019-04-09 RX ORDER — METHYLPREDNISOLONE SOD SUCC 125 MG
100 VIAL (EA) INJECTION
Status: CANCELLED
Start: 2019-09-10

## 2019-04-09 RX ORDER — ACETAMINOPHEN 500 MG
1000 TABLET ORAL
Status: CANCELLED | OUTPATIENT
Start: 2019-09-10

## 2019-04-09 RX ADMIN — OCRELIZUMAB 300 MG: 300 INJECTION INTRAVENOUS at 10:04

## 2019-04-09 RX ADMIN — FAMOTIDINE 20 MG: 10 INJECTION, SOLUTION INTRAVENOUS at 09:04

## 2019-04-09 RX ADMIN — ACETAMINOPHEN 1000 MG: 500 TABLET ORAL at 09:04

## 2019-04-09 RX ADMIN — DIPHENHYDRAMINE HYDROCHLORIDE 50 MG: 50 INJECTION, SOLUTION INTRAMUSCULAR; INTRAVENOUS at 09:04

## 2019-04-09 RX ADMIN — METHYLPREDNISOLONE SODIUM SUCCINATE 100 MG: 125 INJECTION, POWDER, FOR SOLUTION INTRAMUSCULAR; INTRAVENOUS at 09:04

## 2019-04-09 NOTE — PLAN OF CARE
Problem: Adult Inpatient Plan of Care  Goal: Plan of Care Review  Outcome: Ongoing (interventions implemented as appropriate)  Not really anything new since last infusion.

## 2019-04-12 NOTE — PROGRESS NOTES
SW met with pt in infusion as we were not able to meet the last time he was in clinic and reported depressive symptoms. Pt was alert and oriented. SW made introductions and explained purpose for the visit was that pt had indicated last infusion he was depressed nearly everyday, so SW wanted to check on him and provide resources. Pt indicated he was unsure if he received the paperwork left for him last time. Pt explained his depression comes in waves, but he does not enjoy talking about his feelings to others as he tries to remain positive. Pt indicated thoughts of suicide at times, but he denied any plan or intention to carry out any harm to himself. Pt indicated he is doing this current infusion for his father who asked him to try it to see if it gives him relief of his symptoms. Pt explained he has a burning sensation and he cannot use his hands or legs. Pt reported he used to run and loved being athletic, and he has trouble coping with not being able to do the things he once enjoyed. He is hopeful the treatment will work, but he is also open to other options if not. Pt mentioned looking into stem cell therapy and western medicine as pt believes the body can heal itself with proper nutritional, but he has not had the self-discipline it takes to achieve this type of diet. We talked about the importance of realistic goal setting and small steps to making big, lifestyle changes. Pt mentioned using social media, specifically [employer] videos of others with MS as inspiration. He explained he tries to remain as positive as possible because he feels the power of the mind is strong tool is healing the body. He also indicated he is Anabaptist and has andrew in a higher power as well. He mentioned having some Spiritism struggles in terms of understanding the purpose behind his disability. SW mentioned our  services within [facility], but pt declined this for now. Pt explained he has a great support system in his  family members, but he does not like to talk to others because he wants to look strong. MEAGAN talked about important of sharing thoughts and feelings and not holding on to these as they can manifest into additional physical symptoms. Pt thanked MEAGAN for visit and explained it is not typical for him to talk so much and it must be the medication (nurse indicated sometimes steroids make pts jittery and chattier than normal). Pt seemed in much better spirits at the end of our visit as evidenced by several jokes he shared with MEAGAN and his infusion nurse. MEAGAN provided pt with a list of 24/7 resources such as The Phone and the [facility] [place] suicide prevention hotline in the event he feels low and wants to reach out to talk to someone. MEAGAN also provided her contact info should he need further assistance or need to talk again. MEAGAN will f/u as requested/needed.

## 2019-04-16 ENCOUNTER — TELEPHONE (OUTPATIENT)
Dept: PSYCHIATRY | Facility: CLINIC | Age: 39
End: 2019-04-16

## 2019-04-16 NOTE — TELEPHONE ENCOUNTER
Spoke with pt by phone to work on work history form for SSDI application.  Pt reported he's doing fair and decided to spend Easter with some older friends.  We will talk on Monday to complete the functioning report for his claim.

## 2019-04-22 ENCOUNTER — TELEPHONE (OUTPATIENT)
Dept: PSYCHIATRY | Facility: CLINIC | Age: 39
End: 2019-04-22

## 2019-04-22 ENCOUNTER — PATIENT MESSAGE (OUTPATIENT)
Dept: PSYCHIATRY | Facility: CLINIC | Age: 39
End: 2019-04-22

## 2019-04-22 NOTE — TELEPHONE ENCOUNTER
Assisted pt by phone to complete the remainder of his work history report and adult function report for his SSDI claim.  Mailed original to [facility] in [place], copy to pt, and kept copy for records.

## 2019-05-03 ENCOUNTER — DOCUMENTATION ONLY (OUTPATIENT)
Dept: NEUROLOGY | Facility: CLINIC | Age: 39
End: 2019-05-03

## 2019-05-14 ENCOUNTER — LAB VISIT (OUTPATIENT)
Dept: LAB | Facility: HOSPITAL | Age: 39
End: 2019-05-14
Payer: COMMERCIAL

## 2019-05-14 ENCOUNTER — OFFICE VISIT (OUTPATIENT)
Dept: NEUROLOGY | Facility: CLINIC | Age: 39
End: 2019-05-14
Payer: COMMERCIAL

## 2019-05-14 VITALS
DIASTOLIC BLOOD PRESSURE: 81 MMHG | HEIGHT: 73 IN | WEIGHT: 178.38 LBS | SYSTOLIC BLOOD PRESSURE: 122 MMHG | BODY MASS INDEX: 23.64 KG/M2 | HEART RATE: 88 BPM

## 2019-05-14 DIAGNOSIS — Z79.899 HIGH RISK MEDICATION USE: ICD-10-CM

## 2019-05-14 DIAGNOSIS — M62.838 MUSCLE SPASM: ICD-10-CM

## 2019-05-14 DIAGNOSIS — G35 MULTIPLE SCLEROSIS: Primary | ICD-10-CM

## 2019-05-14 DIAGNOSIS — Z29.89 PROPHYLACTIC IMMUNOTHERAPY: ICD-10-CM

## 2019-05-14 DIAGNOSIS — Z71.89 COUNSELING REGARDING GOALS OF CARE: ICD-10-CM

## 2019-05-14 DIAGNOSIS — R26.9 GAIT DISTURBANCE: ICD-10-CM

## 2019-05-14 DIAGNOSIS — G35 MULTIPLE SCLEROSIS: ICD-10-CM

## 2019-05-14 DIAGNOSIS — M79.2 NERVE PAIN: ICD-10-CM

## 2019-05-14 PROCEDURE — 99215 PR OFFICE/OUTPT VISIT, EST, LEVL V, 40-54 MIN: ICD-10-PCS | Mod: S$GLB,,, | Performed by: CLINICAL NURSE SPECIALIST

## 2019-05-14 PROCEDURE — 3008F PR BODY MASS INDEX (BMI) DOCUMENTED: ICD-10-PCS | Mod: CPTII,S$GLB,, | Performed by: CLINICAL NURSE SPECIALIST

## 2019-05-14 PROCEDURE — 99215 OFFICE O/P EST HI 40 MIN: CPT | Mod: S$GLB,,, | Performed by: CLINICAL NURSE SPECIALIST

## 2019-05-14 PROCEDURE — 3008F BODY MASS INDEX DOCD: CPT | Mod: CPTII,S$GLB,, | Performed by: CLINICAL NURSE SPECIALIST

## 2019-05-14 PROCEDURE — 86618 LYME DISEASE ANTIBODY: CPT

## 2019-05-14 PROCEDURE — 36415 COLL VENOUS BLD VENIPUNCTURE: CPT

## 2019-05-14 PROCEDURE — 99999 PR PBB SHADOW E&M-EST. PATIENT-LVL III: ICD-10-PCS | Mod: PBBFAC,,, | Performed by: CLINICAL NURSE SPECIALIST

## 2019-05-14 PROCEDURE — 99999 PR PBB SHADOW E&M-EST. PATIENT-LVL III: CPT | Mod: PBBFAC,,, | Performed by: CLINICAL NURSE SPECIALIST

## 2019-05-14 NOTE — PROGRESS NOTES
Subjective:       Patient ID: Larry Hinds is a 38 y.o. male who presents today for a routine clinic visit for MS.  The history has been provided by the patient. He is accompanied by his father.   MS HPI:  · DMT: Ocrevus on 3/26 and 4/9  · Side effects from DMT? Yes - Some rashes during infusion   · Taking vitamin D3 as recommended? Yes -  Dose: 3000 units Vitamin D  · He has been doing PT. He needs a new Rx. He has a custom right AFO which he is picking up tomorrow.   · He has recently applied for disability.   · He has started doing the Wahls protocol.     SOCIAL HISTORY  Social History     Tobacco Use    Smoking status: Never Smoker    Smokeless tobacco: Never Used   Substance Use Topics    Alcohol use: No     Frequency: Never    Drug use: No     Living arrangements - the patient lives alone.  Employment: actor; not currently working     MS ROS:  · Fatigue: Yes - Energy level is not good.   · Sleep Disturbance: Yes - Trouble falling asleep and staying asleep   · Bladder Dysfunction: Yes - Trouble getting his stream started; has to sit and relax to make it easier to go  · Bowel Dysfunction: No  · Spasticity: Yes - Has muscle spasms at night and in the morning when he wakes up.   · Visual Symptoms: Yes - Vision gets blurry when he exerts himself.   · Cognitive: Yes - He has noticed some short-term memory issues over the years.   · Mood Disorder: Yes - Mood has been pretty good. Being social helps.   · Gait Disturbance: No  · Falls: No  · Hand Dysfunction: Yes - He has numbness in his hands, and right hand is slow.   · Pain: Yes - He has a deep aching pain.   · Sexual Dysfunction: Not Assessed  · Skin Breakdown: No  · Tremors: No  · Dysphagia:  No  · Dysarthria:  No  · Heat sensitivity:  Yes - When he gets overheated, he becomes very fatigued.   · Any un-met adaptive needs? No  · Copay Assist?   Yes - ?$0        Objective:        1. 25 foot timed walk: 8.8 seconds today without assist; was 7.3 seconds at last  visit without assist   Neurologic Exam         MENTAL STATUS: language is fluent, normal verbal comprehension, short-term and remote memory is intact, attention is normal, patient is alert and oriented x 3, fund of knowlege is appropriate by vocabulary.      CRANIAL NERVE EXAM:  There is no CAROL.  Extraocular muscles are intact. No facial asymmetry. Facial sensation is intact bilaterally. There is no dysarthria. Uvula is midline, and palate moves symmetrically. Shoulder shrug intact bilaterally. Tongue protrusion is midline. Hearing is grossly intact. Neck is supple.      MOTOR EXAM: increased tone in the LE bilaterally; slow RSM both hands, right> left;   4+/5 extensors of the UE bilaterally  RLE: 2/5 HF, 4/5 KF, 4/5 DF  LLE 4+/5 all flexors     REFLEXES: 3+ and symmetric right side; 2+ symmetric on the left side; toes silent bilaterally     SENSORY EXAM: decreased vibration all 4 limbs distally, worse in right side     COORDINATION: mild dystaxia on FTN bilaterally     GAIT:  As above; tandem walking ok with handheld assist from provider; Romberg negative      Imaging:     Imaging reviewed at last visit.     Labs:     Lab Results   Component Value Date    ZMXSFYQN85AJ 50 02/12/2019     Lab Results   Component Value Date    JCVINDEX 2.91 (A) 02/12/2019    JCVANTIBODY Positive (A) 02/12/2019     Lab Results   Component Value Date    QO5FVWGC 73.0 02/12/2019    ABSOLUTECD3 1547 02/12/2019    SB5JURWZ 21.9 02/12/2019    ABSOLUTECD8 465 02/12/2019    EE4NVGBP 46.9 02/12/2019    ABSOLUTECD4 994 02/12/2019    LABCD48 2.14 02/12/2019     Lab Results   Component Value Date    WBC 6.55 02/12/2019    RBC 5.31 02/12/2019    HGB 15.9 02/12/2019    HCT 46.6 02/12/2019    MCV 88 02/12/2019    MCH 29.9 02/12/2019    MCHC 34.1 02/12/2019    RDW 11.3 (L) 02/12/2019     02/12/2019    MPV 10.0 02/12/2019    GRAN 3.6 02/12/2019    GRAN 54.5 02/12/2019    LYMPH 2.0 02/12/2019    LYMPH 30.1 02/12/2019    MONO 0.5 02/12/2019     MONO 7.9 02/12/2019    EOS 0.4 02/12/2019    BASO 0.09 02/12/2019    EOSINOPHIL 5.8 02/12/2019    BASOPHIL 1.4 02/12/2019     Sodium   Date Value Ref Range Status   02/12/2019 140 136 - 145 mmol/L Final     Potassium   Date Value Ref Range Status   02/12/2019 4.2 3.5 - 5.1 mmol/L Final     Chloride   Date Value Ref Range Status   02/12/2019 102 95 - 110 mmol/L Final     CO2   Date Value Ref Range Status   02/12/2019 28 23 - 29 mmol/L Final     Glucose   Date Value Ref Range Status   02/12/2019 97 70 - 110 mg/dL Final     BUN, Bld   Date Value Ref Range Status   02/12/2019 17 6 - 20 mg/dL Final     Creatinine   Date Value Ref Range Status   02/12/2019 1.2 0.5 - 1.4 mg/dL Final     Calcium   Date Value Ref Range Status   02/12/2019 10.2 8.7 - 10.5 mg/dL Final     Total Protein   Date Value Ref Range Status   02/12/2019 8.3 6.0 - 8.4 g/dL Final     Albumin   Date Value Ref Range Status   02/12/2019 4.5 3.5 - 5.2 g/dL Final     Total Bilirubin   Date Value Ref Range Status   02/12/2019 0.6 0.1 - 1.0 mg/dL Final     Comment:     For infants and newborns, interpretation of results should be based  on gestational age, weight and in agreement with clinical  observations.  Premature Infant recommended reference ranges:  Up to 24 hours.............<8.0 mg/dL  Up to 48 hours............<12.0 mg/dL  3-5 days..................<15.0 mg/dL  6-29 days.................<15.0 mg/dL       Alkaline Phosphatase   Date Value Ref Range Status   02/12/2019 58 55 - 135 U/L Final     AST   Date Value Ref Range Status   02/12/2019 13 10 - 40 U/L Final     ALT   Date Value Ref Range Status   02/12/2019 16 10 - 44 U/L Final     Anion Gap   Date Value Ref Range Status   02/12/2019 10 8 - 16 mmol/L Final     eGFR if    Date Value Ref Range Status   02/12/2019 >60.0 >60 mL/min/1.73 m^2 Final     eGFR if non    Date Value Ref Range Status   02/12/2019 >60.0 >60 mL/min/1.73 m^2 Final     Comment:     Calculation used  to obtain the estimated glomerular filtration  rate (eGFR) is the CKD-EPI equation.            Diagnosis/Assessment/Plan:    1. Multiple Sclerosis  · Assessment: Martin's walk time is a bit slower today than at last visit. He has not received his AFO yet and needs a new RX for PT. He has started doing the Wahls diet protocol this past week. He is interested in being tested for Lyme disease. He recalls a tic exposure in 2004, after which he developed some leg pain. Lyme Ab ordered.    · Imaging: MRI brain with contrast in early September   · Disease Modifying Therapies: Continue Ocrevus and Vitamin D. Will check CBC, CD20, hepatitis B, and Vitamin D labs in August. He is aware of the risks associated with immune therapy, including increased risk of infection.     2. MS Symptom Assessment / Management  · Fatigue: Advised that he increase Co-Q-10 to 500mg daily.   · Bladder Dysfunction: Will monitor.   · Spasticity: He defers treatment.   · Cognitive: Will monitor.   · Mood Disorder: Will monitor.   · Gait Disturbance: Will send new RX for PT to [facility].   · Hand Dysfunction: May consider OT in the future.   · Pain: Discussed use of compounded neuropathic pain cream; may consider this in the future.   · Heat sensitivity: Order and application for cooling equipment provided to patient.      Over 50% of this 40 minute visit was spent in direct face to face counseling of the patient about MS, DMT considerations, and MS symptom management. The patient agrees with the plan of care. He will follow up with Dr. Bella in September after MRI.     Nelly Fernandez, [facility]-BC, MSCN      There are no diagnoses linked to this encounter.

## 2019-05-16 ENCOUNTER — DOCUMENTATION ONLY (OUTPATIENT)
Dept: NEUROLOGY | Facility: CLINIC | Age: 39
End: 2019-05-16

## 2019-05-16 LAB — B BURGDOR AB SER IA-ACNC: 0.02 INDEX VALUE

## 2019-06-28 ENCOUNTER — TELEPHONE (OUTPATIENT)
Dept: NEUROLOGY | Facility: CLINIC | Age: 39
End: 2019-06-28

## 2019-06-28 NOTE — TELEPHONE ENCOUNTER
----- Message from Tiffanie Monsivais sent at 6/28/2019  3:18 PM CDT -----  Contact: pt  The pt states he is schedule for a MRI appt in [place], no appts scheduled and can be reached at [phone]///[id number]

## 2019-07-01 ENCOUNTER — TELEPHONE (OUTPATIENT)
Dept: PSYCHIATRY | Facility: CLINIC | Age: 39
End: 2019-07-01

## 2019-07-01 ENCOUNTER — PATIENT MESSAGE (OUTPATIENT)
Dept: NEUROLOGY | Facility: CLINIC | Age: 39
End: 2019-07-01

## 2019-07-01 NOTE — TELEPHONE ENCOUNTER
Received the following email from pt:    Radha Mccord,     I hope youre doing very well.     Really quick, I know you said this process could take up to 2 years, but I was curious if there is anything I could possibly do to speed up the process? I am in desperate need for help, and my body is deteriorating quickly. It is Unbelievably frustrating, especially after doing everything in my power to heal... but the disease is winning.     I need to somehow get out on my own, my entire family has to help me with everything and I feel like a massive burden on them, and honestly I feel like a huge embarrassment, but theres nothing I can do about it.    I just thought to email you to find out what I can do to possibly get disability quicker to help get myself on my own and try and take care of myself. Thank you.     Hope you are having a wonderful weekend,    Martin

## 2019-07-02 NOTE — TELEPHONE ENCOUNTER
Pt emailed again to advise that he was just approved for Social Security benefits, so nothing is needed from this office.

## 2019-07-05 DIAGNOSIS — G35 MULTIPLE SCLEROSIS: Primary | ICD-10-CM

## 2019-07-08 ENCOUNTER — DOCUMENTATION ONLY (OUTPATIENT)
Dept: NEUROLOGY | Facility: CLINIC | Age: 39
End: 2019-07-08

## 2019-07-30 ENCOUNTER — PATIENT MESSAGE (OUTPATIENT)
Dept: NEUROLOGY | Facility: CLINIC | Age: 39
End: 2019-07-30

## 2019-08-29 ENCOUNTER — OFFICE VISIT (OUTPATIENT)
Dept: INTERNAL MEDICINE | Facility: CLINIC | Age: 39
End: 2019-08-29
Payer: MEDICAID

## 2019-08-29 ENCOUNTER — PATIENT OUTREACH (OUTPATIENT)
Dept: ADMINISTRATIVE | Facility: HOSPITAL | Age: 39
End: 2019-08-29

## 2019-08-29 VITALS
RESPIRATION RATE: 18 BRPM | OXYGEN SATURATION: 96 % | DIASTOLIC BLOOD PRESSURE: 80 MMHG | BODY MASS INDEX: 22.57 KG/M2 | HEART RATE: 78 BPM | WEIGHT: 171.06 LBS | TEMPERATURE: 96 F | SYSTOLIC BLOOD PRESSURE: 98 MMHG

## 2019-08-29 DIAGNOSIS — Z02.9 ADMINISTRATIVE ENCOUNTER: ICD-10-CM

## 2019-08-29 DIAGNOSIS — G35 MS (MULTIPLE SCLEROSIS): Primary | ICD-10-CM

## 2019-08-29 DIAGNOSIS — R26.9 GAIT DISTURBANCE: ICD-10-CM

## 2019-08-29 DIAGNOSIS — Z13.220 SCREENING FOR HYPERLIPIDEMIA: ICD-10-CM

## 2019-08-29 PROCEDURE — 99213 PR OFFICE/OUTPT VISIT, EST, LEVL III, 20-29 MIN: ICD-10-PCS | Mod: S$PBB,,, | Performed by: FAMILY MEDICINE

## 2019-08-29 PROCEDURE — 99999 PR PBB SHADOW E&M-EST. PATIENT-LVL III: ICD-10-PCS | Mod: PBBFAC,,, | Performed by: FAMILY MEDICINE

## 2019-08-29 PROCEDURE — 99999 PR PBB SHADOW E&M-EST. PATIENT-LVL III: CPT | Mod: PBBFAC,,, | Performed by: FAMILY MEDICINE

## 2019-08-29 PROCEDURE — 99213 OFFICE O/P EST LOW 20 MIN: CPT | Mod: S$PBB,,, | Performed by: FAMILY MEDICINE

## 2019-08-29 PROCEDURE — 99213 OFFICE O/P EST LOW 20 MIN: CPT | Mod: PBBFAC | Performed by: FAMILY MEDICINE

## 2019-08-29 NOTE — PROGRESS NOTES
Subjective:       Patient ID: Larry Hinds is a 39 y.o. male.    Chief Complaint: No chief complaint on file.    HPI     38 yo M     MS  Transverse myelitis - 2009    F.H.  Cancer  - breast, leukemia ( uncertain ), esophageal - asbestosis  Father - Stroke in Sept / CAD -   Mother - Breast Cancer       No alcohol  Never smoker  No drugs      Having issues urination  Suspect related to neurologic disease.      Heat limits ability to walk   Has to stop.  Some days unable to walk 100 feet    Mood is ok  Denies depression              Review of Systems   Constitutional: Negative for activity change.   Eyes: Negative for discharge.   Respiratory: Negative for wheezing.    Cardiovascular: Negative for chest pain and palpitations.   Gastrointestinal: Negative for blood in stool, constipation, diarrhea and vomiting.   Genitourinary: Positive for difficulty urinating and urgency. Negative for hematuria.   Musculoskeletal: Positive for arthralgias.   Neurological: Positive for weakness and headaches.   Psychiatric/Behavioral: Positive for dysphoric mood.       Objective:       Vitals:    08/29/19 1128   BP: 98/80   Pulse: 78   Resp: 18   Temp: 96.2 °F (35.7 °C)       Physical Exam   Constitutional: He is oriented to person, place, and time. He appears well-developed.   HENT:   Head: Normocephalic.   Eyes: Conjunctivae are normal.   Cardiovascular: Normal rate and regular rhythm.   Pulmonary/Chest: Effort normal and breath sounds normal.   Musculoskeletal: Normal range of motion.   Neurological: He is alert and oriented to person, place, and time.   Skin: No pallor.       Assessment:       1. MS (multiple sclerosis)    2. Screening for hyperlipidemia    3. Gait disturbance    4. Administrative encounter        Plan:   Screening for hyperlipidemia  -     Lipid panel; Future; Expected date: 08/29/2019    Gait disturbance  MS -  Unable to walk long distances at time    Administrative encounter  Handicap tag provided          Immunization due  Declines td shot    MS-  Struggling still  Some days better than others  Follows neurology   Moving into own apartment    Admin visit  handicap  MS -  Handicap tag provided.    No follow-ups on file.

## 2019-08-30 ENCOUNTER — LAB VISIT (OUTPATIENT)
Dept: LAB | Facility: HOSPITAL | Age: 39
End: 2019-08-30
Attending: CLINICAL NURSE SPECIALIST
Payer: MEDICAID

## 2019-08-30 DIAGNOSIS — Z13.220 SCREENING FOR HYPERLIPIDEMIA: ICD-10-CM

## 2019-08-30 DIAGNOSIS — G35 MULTIPLE SCLEROSIS: ICD-10-CM

## 2019-08-30 LAB
25(OH)D3+25(OH)D2 SERPL-MCNC: 51 NG/ML (ref 30–96)
BASOPHILS # BLD AUTO: 0.09 K/UL (ref 0–0.2)
BASOPHILS NFR BLD: 1.3 % (ref 0–1.9)
CHOLEST SERPL-MCNC: 302 MG/DL (ref 120–199)
CHOLEST/HDLC SERPL: 8.9 {RATIO} (ref 2–5)
DIFFERENTIAL METHOD: NORMAL
EOSINOPHIL # BLD AUTO: 0.4 K/UL (ref 0–0.5)
EOSINOPHIL NFR BLD: 5.2 % (ref 0–8)
ERYTHROCYTE [DISTWIDTH] IN BLOOD BY AUTOMATED COUNT: 11.5 % (ref 11.5–14.5)
HCT VFR BLD AUTO: 48.5 % (ref 40–54)
HDLC SERPL-MCNC: 34 MG/DL (ref 40–75)
HDLC SERPL: 11.3 % (ref 20–50)
HGB BLD-MCNC: 16 G/DL (ref 14–18)
IMM GRANULOCYTES # BLD AUTO: 0.03 K/UL (ref 0–0.04)
IMM GRANULOCYTES NFR BLD AUTO: 0.4 % (ref 0–0.5)
LDLC SERPL CALC-MCNC: ABNORMAL MG/DL (ref 63–159)
LYMPHOCYTES # BLD AUTO: 1.8 K/UL (ref 1–4.8)
LYMPHOCYTES NFR BLD: 27.2 % (ref 18–48)
MCH RBC QN AUTO: 30.4 PG (ref 27–31)
MCHC RBC AUTO-ENTMCNC: 33 G/DL (ref 32–36)
MCV RBC AUTO: 92 FL (ref 82–98)
MONOCYTES # BLD AUTO: 0.6 K/UL (ref 0.3–1)
MONOCYTES NFR BLD: 9.3 % (ref 4–15)
NEUTROPHILS # BLD AUTO: 3.8 K/UL (ref 1.8–7.7)
NEUTROPHILS NFR BLD: 56.6 % (ref 38–73)
NONHDLC SERPL-MCNC: 268 MG/DL
NRBC BLD-RTO: 0 /100 WBC
PLATELET # BLD AUTO: 271 K/UL (ref 150–350)
PMV BLD AUTO: 10.2 FL (ref 9.2–12.9)
RBC # BLD AUTO: 5.27 M/UL (ref 4.6–6.2)
TRIGL SERPL-MCNC: 433 MG/DL (ref 30–150)
WBC # BLD AUTO: 6.74 K/UL (ref 3.9–12.7)

## 2019-08-30 PROCEDURE — 85025 COMPLETE CBC W/AUTO DIFF WBC: CPT

## 2019-08-30 PROCEDURE — 82306 VITAMIN D 25 HYDROXY: CPT

## 2019-08-30 PROCEDURE — 80061 LIPID PANEL: CPT

## 2019-08-30 PROCEDURE — 86704 HEP B CORE ANTIBODY TOTAL: CPT

## 2019-08-30 PROCEDURE — 86356 MONONUCLEAR CELL ANTIGEN: CPT

## 2019-08-30 PROCEDURE — 86706 HEP B SURFACE ANTIBODY: CPT

## 2019-08-30 PROCEDURE — 87340 HEPATITIS B SURFACE AG IA: CPT

## 2019-08-30 PROCEDURE — 36415 COLL VENOUS BLD VENIPUNCTURE: CPT

## 2019-09-02 LAB
HBV CORE AB SERPL QL IA: NEGATIVE
HBV SURFACE AB SER-ACNC: NEGATIVE M[IU]/ML
HBV SURFACE AG SERPL QL IA: NEGATIVE

## 2019-09-04 ENCOUNTER — TELEPHONE (OUTPATIENT)
Dept: INTERNAL MEDICINE | Facility: CLINIC | Age: 39
End: 2019-09-04

## 2019-09-04 LAB — CD20 CELLS NFR SPEC: NORMAL %

## 2019-09-04 NOTE — TELEPHONE ENCOUNTER
----- Message from Guillermina Baldwin MD sent at 9/4/2019 12:02 PM CDT -----  Please call patient  Confirm he was fasting for these labs.    The 10-year ASCVD risk score (Raj GARRETT JrDes, et al., 2013) is: 3%    Values used to calculate the score:      Age: 39 years      Sex: Male      Is Non- : No      Diabetic: No      Tobacco smoker: No      Systolic Blood Pressure: 98 mmHg      Is BP treated: No      HDL Cholesterol: 34 mg/dL      Total Cholesterol: 302 mg/dL  His Risk score is less than 7.5 % of vascular even in next 10 years. However, his Trig are very high, and I am not sure what is LDL is because when TRIG high, cannot calculate. At times this can be caused when eating and doing the labs without fasting.    IF indeed this high, could be caused from some meds if he takes. (I am not sure all MS meds he has been given).  Confirm fasting state so we can address if anything we need to do, (repeat fasting, or tx Triglycerides). Send low Fat, low cholesterol diet home.     Authored by Resident/PA/NP

## 2019-09-05 ENCOUNTER — PATIENT MESSAGE (OUTPATIENT)
Dept: NEUROLOGY | Facility: CLINIC | Age: 39
End: 2019-09-05

## 2019-09-09 ENCOUNTER — PATIENT MESSAGE (OUTPATIENT)
Dept: NEUROLOGY | Facility: CLINIC | Age: 39
End: 2019-09-09

## 2019-09-10 ENCOUNTER — HOSPITAL ENCOUNTER (OUTPATIENT)
Dept: RADIOLOGY | Facility: HOSPITAL | Age: 39
Discharge: HOME | End: 2019-09-10
Attending: CLINICAL NURSE SPECIALIST
Payer: MEDICAID

## 2019-09-10 DIAGNOSIS — G35 MULTIPLE SCLEROSIS: ICD-10-CM

## 2019-09-10 DIAGNOSIS — G35 MS (MULTIPLE SCLEROSIS): Primary | ICD-10-CM

## 2019-09-10 PROCEDURE — A9585 GADOBUTROL INJECTION: HCPCS | Performed by: CLINICAL NURSE SPECIALIST

## 2019-09-10 PROCEDURE — 70553 MRI BRAIN STEM W/O & W/DYE: CPT | Mod: TC

## 2019-09-10 PROCEDURE — 25500020 PHARM REV CODE 255: Performed by: CLINICAL NURSE SPECIALIST

## 2019-09-10 RX ORDER — GADOBUTROL 604.72 MG/ML
10 INJECTION INTRAVENOUS
Status: COMPLETED | OUTPATIENT
Start: 2019-09-10 | End: 2019-09-10

## 2019-09-10 RX ADMIN — GADOBUTROL 7 ML: 604.72 INJECTION INTRAVENOUS at 11:09

## 2019-09-18 ENCOUNTER — OFFICE VISIT (OUTPATIENT)
Dept: NEUROLOGY | Facility: CLINIC | Age: 39
End: 2019-09-18
Payer: MEDICAID

## 2019-09-18 DIAGNOSIS — R26.9 GAIT DISORDER: ICD-10-CM

## 2019-09-18 DIAGNOSIS — G35 MS (MULTIPLE SCLEROSIS): Primary | ICD-10-CM

## 2019-09-18 DIAGNOSIS — R53.83 FATIGUE, UNSPECIFIED TYPE: ICD-10-CM

## 2019-09-18 DIAGNOSIS — Z71.89 COUNSELING REGARDING GOALS OF CARE: ICD-10-CM

## 2019-09-18 DIAGNOSIS — G81.90 HEMIPARESIS, UNSPECIFIED HEMIPARESIS ETIOLOGY, UNSPECIFIED LATERALITY: ICD-10-CM

## 2019-09-18 DIAGNOSIS — D84.9 IMMUNOSUPPRESSION: ICD-10-CM

## 2019-09-18 DIAGNOSIS — R26.9 GAIT DISTURBANCE: ICD-10-CM

## 2019-09-18 PROCEDURE — 99215 PR OFFICE/OUTPT VISIT, EST, LEVL V, 40-54 MIN: ICD-10-PCS | Mod: S$PBB,,, | Performed by: PSYCHIATRY & NEUROLOGY

## 2019-09-18 PROCEDURE — 99212 OFFICE O/P EST SF 10 MIN: CPT | Mod: PBBFAC | Performed by: PSYCHIATRY & NEUROLOGY

## 2019-09-18 PROCEDURE — 99999 PR PBB SHADOW E&M-EST. PATIENT-LVL II: CPT | Mod: PBBFAC,,, | Performed by: PSYCHIATRY & NEUROLOGY

## 2019-09-18 PROCEDURE — 99999 PR PBB SHADOW E&M-EST. PATIENT-LVL II: ICD-10-PCS | Mod: PBBFAC,,, | Performed by: PSYCHIATRY & NEUROLOGY

## 2019-09-18 PROCEDURE — 99215 OFFICE O/P EST HI 40 MIN: CPT | Mod: S$PBB,,, | Performed by: PSYCHIATRY & NEUROLOGY

## 2019-09-18 RX ORDER — AA/PROT/LYSINE/METHIO/VIT C/B6 50-12.5 MG
10 TABLET ORAL DAILY
COMMUNITY

## 2019-09-18 RX ORDER — DALFAMPRIDINE 10 MG/1
10 TABLET, FILM COATED, EXTENDED RELEASE ORAL EVERY 12 HOURS
Qty: 60 TABLET | Refills: 5 | Status: SHIPPED | OUTPATIENT
Start: 2019-09-18 | End: 2019-10-01

## 2019-09-18 RX ORDER — MODAFINIL 100 MG/1
100 TABLET ORAL DAILY
Qty: 30 TABLET | Refills: 5 | Status: SHIPPED | OUTPATIENT
Start: 2019-09-18 | End: 2019-10-18

## 2019-09-18 RX ORDER — MULTIVIT WITH MINERALS/HERBS
1 TABLET ORAL DAILY
COMMUNITY

## 2019-09-18 NOTE — PROGRESS NOTES
Subjective:       Patient ID: Laryr Hinds is a 39 y.o. male who presents today for a routine clinic visit for MS. He comes with father today.    MS HPI:  · DMT: Ocrevus.. Due for next infusion on 9/26/2019.   · Side effects from DMT? Yes - Headaches following the infusion. Some rash occasionally.  · Taking vitamin D3 as recommended? Yes  Dose: 10,000IU daily.    -- Dizziness for the past week. Provoked by turning the head rapidly. Last 10-15secs. No N/V or tinnitus.  -- Muscle aches all over, especially in the morning. Gets better after he walks around a bit.  -- Has weakness in his right arm and leg. It's been like that since his last major relapse. His right leg 'drops' on walking short distances.  -- Has started taking some new supplements. Stem Cell Support and RiboCeine.     SOCIAL HISTORY  Social History     Tobacco Use    Smoking status: Never Smoker    Smokeless tobacco: Never Used   Substance Use Topics    Alcohol use: No     Frequency: 2-4 times a month     Drinks per session: 3 or 4     Binge frequency: Never    Drug use: No     Living arrangements - the patient lives alone.  Employment - On disability (Actor).      MS REVIEW OF SYMPTOMS 9/13/2019   Do you feel abnormally tired on most days? Yes - Says its unbearable. Getting worse.   Do you feel you generally sleep well? No   Do you have difficulty controlling your bladder?  Yes - Has accidents occasionally.   Do you have difficulty controlling your bowels?  Yes   Do you have frequent muscle cramps, tightness or spasms in your limbs?  Yes   Do you have new visual symptoms?  Yes - Occurs when he's working out.    Do you have worsening difficulty with your memory or thinking? Yes    Do you have worsening symptoms of anxiety or depression?  No   For patients who walk, Do you have more difficulty walking?  Yes - Mechanical fall. Tripped over his right leg.   Have you fallen since your last visit?  Yes   For patients who use wheelchairs: Do you have  any skin wounds or breakdown? Not Applicable   Do you have difficulty using your hands?  Yes  - Hands are numb.    Do you have shooting or burning pain? Yes   Do you have difficulty with sexual function?  Yes   If you are sexually active, are you using birth control? Y/N  N/A No   Do you often choke when swallowing liquids or solid food?  No   Do you experience worsening symptoms when overheated? Yes   Do you need any new equipment such as a wheelchair, walker or shower chair? No   Do you receive co-pay financial assistance for your principal MS medicine? Yes   Would you be interested in participating in an MS research trial in the future? Yes   Do you feel you have adequate family/friend support?  Yes   Do you have health insurance?   Yes   Are you currently employed? No   Do you receive SSDI/SSI?  Yes   Do you use marijuana or cannabis products? No   Have you been diagnosed with a urinary tract infection since your last visit here? No   Have you been diagnosed with a respiratory tract infection since your last visit here? No   Have you been to the emergency room since your last visit here? No   Have you been hospitalized since your last visit here?  No                 Objective:        1. 25 foot timed walk: 8.9sec  8.8 seconds on last visit.    Neurologic Exam     Mental Status   Oriented to person, place, and time.     Cranial Nerves     CN III, IV, VI   Pupils are equal, round, and reactive to light.  Extraocular motions are normal.     CN V   Facial sensation intact.     CN VII   Facial expression full, symmetric.     CN XI   CN XI normal.     CN XII   CN XII normal.     Motor Exam   Muscle bulk: normal    Strength   Right deltoid: 5/5  Left deltoid: 5/5  Right biceps: 5/5  Left biceps: 5/5  Right triceps: 5/5  Left triceps: 5/5  Right interossei: 5/5  Left interossei: 5/5  Right iliopsoas: 3/5  Left iliopsoas: 4/5  Right quadriceps: 5/5  Left quadriceps: 5/5  Right hamstrin/5  Left hamstrin/5  Right  anterior tibial: 5/5  Left anterior tibial: 5/5  Right gastroc: 5/5  Left gastroc: 5/5    Sensory Exam   Light touch normal.   Right arm vibration: decreased from fingers  Left arm vibration: normal  Right leg vibration: decreased from ankle  Left leg vibration: decreased from ankle    Gait, Coordination, and Reflexes     Gait  Gait: spastic    Coordination   Finger to nose coordination: normal    Reflexes   Right brachioradialis: 2+  Left brachioradialis: 2+  Right biceps: 2+  Left biceps: 2+  Right patellar: 2+  Left patellar: 2+  Right achilles: 2+  Left achilles: 2+            Imaging:       Results for orders placed during the hospital encounter of 09/10/19   MRI Brain Demyelinating W W/O Contrast    Impression There is no significant change compared to the MRI from February 2019.  T2 FLAIR hyperintense white matter lesions are consistent with demyelinating plaques and multiple sclerosis.  No abnormal enhancement as may occur with active demyelination.      Electronically signed by: Elliot Kramer Jr., MD  Date:    09/10/2019  Time:    11:34     Results for orders placed during the hospital encounter of 02/14/19   MRI Cervical Spine Demyelinating W W/O Contrast    Impression Multifocal signal abnormality throughout the length of the cervical and upper thoracic cord, in keeping with demyelinating plaque in this patient with known history of demyelinating disease.  Some cord atrophy also noted from the levels of C3-4 through C4-5.  No abnormal enhancement to suggest active demyelination.    Multilevel degenerative disc disease contributing to multilevel spinal canal or neural foraminal stenosis, most severe at the levels of C5-6 and C6-7.      Electronically signed by: Girma Higginbotham MD  Date:    02/14/2019  Time:    14:49     Results for orders placed during the hospital encounter of 02/14/19   MRI Thoracic Spine Demyelinating W W/O Contrast    Impression Multifocal signal abnormality throughout the thoracic  cord, in keeping with demyelinating plaque in this patient with known history of demyelinating disease.    2 small foci of enhancement noted at the level of T6 and T6-7, concerning for active sites of demyelination.      Electronically signed by: Girma Higginbotham MD  Date:    02/14/2019  Time:    15:08       Labs:     Lab Results   Component Value Date    [id number] 51 08/30/2019    PWCFAVOE55ZQ 50 02/12/2019     Lab Results   Component Value Date    JCVINDEX 2.91 (A) 02/12/2019    JCVANTIBODY Positive (A) 02/12/2019     Lab Results   Component Value Date    LA5GWKJY 73.0 02/12/2019    ABSOLUTECD3 1547 02/12/2019    GB8TYHCH 21.9 02/12/2019    ABSOLUTECD8 465 02/12/2019    NL7ESDMM 46.9 02/12/2019    ABSOLUTECD4 994 02/12/2019    LABCD48 2.14 02/12/2019     Lab Results   Component Value Date    WBC 6.74 08/30/2019    RBC 5.27 08/30/2019    HGB 16.0 08/30/2019    HCT 48.5 08/30/2019    MCV 92 08/30/2019    MCH 30.4 08/30/2019    MCHC 33.0 08/30/2019    RDW 11.5 08/30/2019     08/30/2019    MPV 10.2 08/30/2019    GRAN 3.8 08/30/2019    GRAN 56.6 08/30/2019    LYMPH 1.8 08/30/2019    LYMPH 27.2 08/30/2019    MONO 0.6 08/30/2019    MONO 9.3 08/30/2019    EOS 0.4 08/30/2019    BASO 0.09 08/30/2019    EOSINOPHIL 5.2 08/30/2019    BASOPHIL 1.3 08/30/2019     Sodium   Date Value Ref Range Status   02/12/2019 140 136 - 145 mmol/L Final     Potassium   Date Value Ref Range Status   02/12/2019 4.2 3.5 - 5.1 mmol/L Final     Chloride   Date Value Ref Range Status   02/12/2019 102 95 - 110 mmol/L Final     CO2   Date Value Ref Range Status   02/12/2019 28 23 - 29 mmol/L Final     Glucose   Date Value Ref Range Status   02/12/2019 97 70 - 110 mg/dL Final     BUN, Bld   Date Value Ref Range Status   02/12/2019 17 6 - 20 mg/dL Final     Creatinine   Date Value Ref Range Status   09/10/2019 1.2 0.5 - 1.4 mg/dL Final     Calcium   Date Value Ref Range Status   02/12/2019 10.2 8.7 - 10.5 mg/dL Final     Total Protein   Date  Value Ref Range Status   02/12/2019 8.3 6.0 - 8.4 g/dL Final     Albumin   Date Value Ref Range Status   02/12/2019 4.5 3.5 - 5.2 g/dL Final     Total Bilirubin   Date Value Ref Range Status   02/12/2019 0.6 0.1 - 1.0 mg/dL Final     Comment:     For infants and newborns, interpretation of results should be based  on gestational age, weight and in agreement with clinical  observations.  Premature Infant recommended reference ranges:  Up to 24 hours.............<8.0 mg/dL  Up to 48 hours............<12.0 mg/dL  3-5 days..................<15.0 mg/dL  6-29 days.................<15.0 mg/dL       Alkaline Phosphatase   Date Value Ref Range Status   02/12/2019 58 55 - 135 U/L Final     AST   Date Value Ref Range Status   02/12/2019 13 10 - 40 U/L Final     ALT   Date Value Ref Range Status   02/12/2019 16 10 - 44 U/L Final     Anion Gap   Date Value Ref Range Status   02/12/2019 10 8 - 16 mmol/L Final     eGFR if    Date Value Ref Range Status   09/10/2019 >60 >60 mL/min/1.73 m^2 Final     eGFR if non    Date Value Ref Range Status   09/10/2019 >60 >60 mL/min/1.73 m^2 Final     Comment:     Calculation used to obtain the estimated glomerular filtration  rate (eGFR) is the CKD-EPI equation.          Diagnosis/Assessment/Plan:    1. Multiple Sclerosis  · Assessment: MS currently maintained on Ocrevus. He has moderate disability  · Imaging: next MRIs planned 8/2020  · Disease Modifying Therapies: continue Ocrevus; next dose Sept 26th; labs per protocol;     2. MS Symptom Assessment / Management  · Fatigue: Gets tired on walking long distances. Modafinil 100mg daily. The rationale, side effects, risks and expectations of this medication was discussed.   · Gait disturbance: start Ampyra. The rationale, side effects, risks and expectations of this medication was discussed.   Refer to PT.    F/u Nelly Fernandez [facility] in 3mo     Our visit today lasted 40 minutes, and 100% of this time was spent face to  face with the patient. Over 50% of this visit included discussion of the treatment plan/medication changes/symptom management/exam findings/imaging results/coordination of care. The patient agrees with the plan of care.         Problem List Items Addressed This Visit        1 - High    MS (multiple sclerosis) - Primary    Relevant Medications    dalfampridine 10 mg Tb12    modafinil (PROVIGIL) 100 MG Tab    Other Relevant Orders    Ambulatory Referral to Physical/Occupational Therapy      Other Visit Diagnoses     Fatigue, unspecified type        Relevant Medications    modafinil (PROVIGIL) 100 MG Tab    Gait disorder        Relevant Orders    Ambulatory Referral to Physical/Occupational Therapy

## 2019-09-18 NOTE — PROGRESS NOTES
"Subjective:       Patient ID: Larry Hinds is a 39 y.o. male who presents today for a {Blank single:02343::"routine","fit-in"} clinic visit for MS.      MS HPI:  · DMT: ***  · Side effects from DMT? {YES **/NO:59316}  · Taking vitamin D3 as recommended? {YES **/NO:79460} Dose:   · ***    SOCIAL HISTORY  Social History     Tobacco Use    Smoking status: Never Smoker    Smokeless tobacco: Never Used   Substance Use Topics    Alcohol use: No     Frequency: 2-4 times a month     Drinks per session: 3 or 4     Binge frequency: Never    Drug use: No     Living arrangements - the patient {lives with:728878::"lives with their family"}.  Employment ***      MS REVIEW OF SYMPTOMS 9/13/2019   Do you feel abnormally tired on most days? Yes   Do you feel you generally sleep well? No   Do you have difficulty controlling your bladder?  Yes   Do you have difficulty controlling your bowels?  Yes   Do you have frequent muscle cramps, tightness or spasms in your limbs?  Yes   Do you have new visual symptoms?  Yes   Do you have worsening difficulty with your memory or thinking? Yes   Do you have worsening symptoms of anxiety or depression?  No   For patients who walk, Do you have more difficulty walking?  Yes   Have you fallen since your last visit?  Yes   For patients who use wheelchairs: Do you have any skin wounds or breakdown? Not Applicable   Do you have difficulty using your hands?  Yes   Do you have shooting or burning pain? Yes   Do you have difficulty with sexual function?  Yes   If you are sexually active, are you using birth control? Y/N  N/A No   Do you often choke when swallowing liquids or solid food?  No   Do you experience worsening symptoms when overheated? Yes   Do you need any new equipment such as a wheelchair, walker or shower chair? No   Do you receive co-pay financial assistance for your principal MS medicine? Yes   Would you be interested in participating in an MS research trial in the future? Yes   Do you " feel you have adequate family/friend support?  Yes   Do you have health insurance?   Yes   Are you currently employed? No   Do you receive SSDI/SSI?  Yes   Do you use marijuana or cannabis products? No   Have you been diagnosed with a urinary tract infection since your last visit here? No   Have you been diagnosed with a respiratory tract infection since your last visit here? No   Have you been to the emergency room since your last visit here? No   Have you been hospitalized since your last visit here?  No     FSS SCORE & INTERPRETATION 9/13/2019   FSS SCORE  63   FSS SCORE INTERPRETATION May be suffering from fatigue     MS MARCI-D SCORE & INTERPRETATION 9/13/2019   MARCI-D SCORE  28   MARCI-D INTERPRETATION  Possibility of major Depression     MS LORRAINE-7 SCORE & INTERPRETATION 9/13/2019   LORRAINE-7 SCORE  6   LORRAINE-7 SCORE INTERPRETATION Mild Anxiety     PEQ MS MOS PAIN EFFECTS SCORE & INTERPRETATION 9/13/2019   PES SCORE 24   PES SCORE INTERPRETATION Scores can range from 6-30.  Items are scaled so that higher scores indicate a greater impact of pain on a patients mood and behavior.     No flowsheet data found.  MS BLADDER CONTROL SCORE & INTERPRETATION 9/13/2019   BLCS SCORE 14   BLCS SCORE INTERPRETATION  Scores can range from 0-22, with higher scores indicating greater bladder control problems.     MS BOWEL CONTROL SCORE & INTERPRETATION 9/13/2019   BWCS SCORE 5   BWCS SCORE INTERPRETATION Scores can range from 0-26, with higher scores indicating greater bowel control problems.     PEQ MS IMPACT OF VISUAL IMPAIRMENT SCORE & INTERPRETATION 9/13/2019   RENNY SCALE SCORE  0   RENNY SCORE INTERPRETATION Scores can range from 0-15, with higher scores indicating greater impact of visual problems on daily activites.     MS PDQ SCORE & INTERPRETATION 9/13/2019   PDQ RETROSPECTIVE MEMORY SUBSCALE 10   PDQ ATTENTION/CONCENTRATION SUBSCALE 13   PDQ PROSPECTIVE MEMORY SUBSCALE 6   PDQ PLANNING/ORGANIZATION SUBSCALE 12   PDQ TOTAL SCORE  41   PDQ SCORE INTERPRETATION Scores can range from 0-80, with higher scores indicating greater perceived cognitive impairment.     MSSS SCORE & INTERPRETATION 9/13/2019   MSSS TANGIBLE SUPPORT SUBSCALE 68.75   MSSS EMOTIONAL/INFORMATIONAL SUPPORT SUBSCALE 71.88   MSSS AFFECTIONATE SUPPORT SUBSCALE 75   MSSS POSITIVE SOCIAL INTERACTION SUBSCALE 50   MSSS TOTAL SCORE 66.41   MSSS SCORE INTERPRETATION Scores can range from 0-100, with higher scores indicating greater perceived support.           Objective:        1. 25 foot timed walk:***  No flowsheet data found.  2. SDMT:***  3. 9 Hole Peg Test:***  No flowsheet data found.    Neurologic Exam          Imaging:     No results found for this or any previous visit.  No results found for this or any previous visit.  No results found for this or any previous visit.  Results for orders placed during the hospital encounter of 09/10/19   MRI Brain Demyelinating W W/O Contrast    Impression There is no significant change compared to the MRI from February 2019.  T2 FLAIR hyperintense white matter lesions are consistent with demyelinating plaques and multiple sclerosis.  No abnormal enhancement as may occur with active demyelination.      Electronically signed by: Elliot Kramer Jr., MD  Date:    09/10/2019  Time:    11:34     Results for orders placed during the hospital encounter of 02/14/19   MRI Cervical Spine Demyelinating W W/O Contrast    Impression Multifocal signal abnormality throughout the length of the cervical and upper thoracic cord, in keeping with demyelinating plaque in this patient with known history of demyelinating disease.  Some cord atrophy also noted from the levels of C3-4 through C4-5.  No abnormal enhancement to suggest active demyelination.    Multilevel degenerative disc disease contributing to multilevel spinal canal or neural foraminal stenosis, most severe at the levels of C5-6 and C6-7.      Electronically signed by: Girma Higginbotham  MD  Date:    02/14/2019  Time:    14:49     Results for orders placed during the hospital encounter of 02/14/19   MRI Thoracic Spine Demyelinating W W/O Contrast    Impression Multifocal signal abnormality throughout the thoracic cord, in keeping with demyelinating plaque in this patient with known history of demyelinating disease.    2 small foci of enhancement noted at the level of T6 and T6-7, concerning for active sites of demyelination.      Electronically signed by: Girma Higginbotham MD  Date:    02/14/2019  Time:    15:08       Labs:     Lab Results   Component Value Date    QAGKFMOE43QT 51 08/30/2019    KHFJYCYF26WZ 50 02/12/2019     Lab Results   Component Value Date    JCVINDEX 2.91 (A) 02/12/2019    JCVANTIBODY Positive (A) 02/12/2019     Lab Results   Component Value Date    FX6QRPWE 73.0 02/12/2019    ABSOLUTECD3 1547 02/12/2019    TA7CTEQA 21.9 02/12/2019    ABSOLUTECD8 465 02/12/2019    CP3BNKGZ 46.9 02/12/2019    ABSOLUTECD4 994 02/12/2019    LABCD48 2.14 02/12/2019     Lab Results   Component Value Date    WBC 6.74 08/30/2019    RBC 5.27 08/30/2019    HGB 16.0 08/30/2019    HCT 48.5 08/30/2019    MCV 92 08/30/2019    MCH 30.4 08/30/2019    MCHC 33.0 08/30/2019    RDW 11.5 08/30/2019     08/30/2019    MPV 10.2 08/30/2019    GRAN 3.8 08/30/2019    GRAN 56.6 08/30/2019    LYMPH 1.8 08/30/2019    LYMPH 27.2 08/30/2019    MONO 0.6 08/30/2019    MONO 9.3 08/30/2019    EOS 0.4 08/30/2019    BASO 0.09 08/30/2019    EOSINOPHIL 5.2 08/30/2019    BASOPHIL 1.3 08/30/2019     Sodium   Date Value Ref Range Status   02/12/2019 140 136 - 145 mmol/L Final     Potassium   Date Value Ref Range Status   02/12/2019 4.2 3.5 - 5.1 mmol/L Final     Chloride   Date Value Ref Range Status   02/12/2019 102 95 - 110 mmol/L Final     CO2   Date Value Ref Range Status   02/12/2019 28 23 - 29 mmol/L Final     Glucose   Date Value Ref Range Status   02/12/2019 97 70 - 110 mg/dL Final     BUN, Bld   Date Value Ref Range  Status   02/12/2019 17 6 - 20 mg/dL Final     Creatinine   Date Value Ref Range Status   09/10/2019 1.2 0.5 - 1.4 mg/dL Final     Calcium   Date Value Ref Range Status   02/12/2019 10.2 8.7 - 10.5 mg/dL Final     Total Protein   Date Value Ref Range Status   02/12/2019 8.3 6.0 - 8.4 g/dL Final     Albumin   Date Value Ref Range Status   02/12/2019 4.5 3.5 - 5.2 g/dL Final     Total Bilirubin   Date Value Ref Range Status   02/12/2019 0.6 0.1 - 1.0 mg/dL Final     Comment:     For infants and newborns, interpretation of results should be based  on gestational age, weight and in agreement with clinical  observations.  Premature Infant recommended reference ranges:  Up to 24 hours.............<8.0 mg/dL  Up to 48 hours............<12.0 mg/dL  3-5 days..................<15.0 mg/dL  6-29 days.................<15.0 mg/dL       Alkaline Phosphatase   Date Value Ref Range Status   02/12/2019 58 55 - 135 U/L Final     AST   Date Value Ref Range Status   02/12/2019 13 10 - 40 U/L Final     ALT   Date Value Ref Range Status   02/12/2019 16 10 - 44 U/L Final     Anion Gap   Date Value Ref Range Status   02/12/2019 10 8 - 16 mmol/L Final     eGFR if    Date Value Ref Range Status   09/10/2019 >60 >60 mL/min/1.73 m^2 Final     eGFR if non    Date Value Ref Range Status   09/10/2019 >60 >60 mL/min/1.73 m^2 Final     Comment:     Calculation used to obtain the estimated glomerular filtration  rate (eGFR) is the CKD-EPI equation.                    Diagnosis/Assessment/Plan:    1. Multiple Sclerosis  · Assessment:***  · Imaging:***  · Disease Modifying Therapies:***    2. MS Symptom Assessment / Management  · Fatigue: ***  · Sleep Disturbance: ***  · Bladder Dysfunction: ***  · Bowel Dysfunction: ***  · Spasticity: ***  · Visual Symptoms: ***  · Cognitive: ***  · Mood Disorder: ***  · Gait Disturbance: ***  · Falls: ***  · Hand Dysfunction: ***   · Pain: ***  · Sexual Dysfunction: ***   · Skin  Breakdown: ***  · Tremors: ***  · Dysphagia:  ***  · Dysarthria:  ***  · Heat sensitivity: ***    · Adaptive needs: ***   · Copay Assist: ***         Our visit today lasted 40 minutes, and 100% of this time was spent face to face with the patient. Over 50% of this visit included discussion of the treatment plan/medication changes/symptom management/exam findings/imaging results/coordination of care. The patient agrees with the plan of care.         Problem List Items Addressed This Visit     None

## 2019-09-19 ENCOUNTER — TELEPHONE (OUTPATIENT)
Dept: PHARMACY | Facility: CLINIC | Age: 39
End: 2019-09-19

## 2019-09-19 NOTE — TELEPHONE ENCOUNTER
"LVM to notify the patient we received the prescription for Dalfampridine, and to see if he has any active primary prescription insurance. Claim is rejecting "submit bill to primary payor." Preferred pharmacies have same insurance on file and are getting the same rejected claim as well. We will continue to follow up.  "

## 2019-09-20 ENCOUNTER — PATIENT MESSAGE (OUTPATIENT)
Dept: NEUROLOGY | Facility: CLINIC | Age: 39
End: 2019-09-20

## 2019-09-23 ENCOUNTER — TELEPHONE (OUTPATIENT)
Dept: PHARMACY | Facility: CLINIC | Age: 39
End: 2019-09-23

## 2019-09-23 ENCOUNTER — DOCUMENTATION ONLY (OUTPATIENT)
Dept: NEUROLOGY | Facility: CLINIC | Age: 39
End: 2019-09-23

## 2019-09-23 NOTE — TELEPHONE ENCOUNTER
LVM to notify the patient we received the prescription for Dalfampridine, and to see if he has any active prescription insurance. We will continue to follow up.

## 2019-09-26 ENCOUNTER — INFUSION (OUTPATIENT)
Dept: INFUSION THERAPY | Facility: HOSPITAL | Age: 39
End: 2019-09-26
Attending: PSYCHIATRY & NEUROLOGY
Payer: MEDICAID

## 2019-09-26 VITALS
OXYGEN SATURATION: 98 % | HEART RATE: 62 BPM | WEIGHT: 169.75 LBS | TEMPERATURE: 97 F | BODY MASS INDEX: 22.4 KG/M2 | SYSTOLIC BLOOD PRESSURE: 101 MMHG | DIASTOLIC BLOOD PRESSURE: 64 MMHG | RESPIRATION RATE: 16 BRPM

## 2019-09-26 DIAGNOSIS — G35 MS (MULTIPLE SCLEROSIS): Primary | ICD-10-CM

## 2019-09-26 PROCEDURE — 96375 TX/PRO/DX INJ NEW DRUG ADDON: CPT

## 2019-09-26 PROCEDURE — 96366 THER/PROPH/DIAG IV INF ADDON: CPT

## 2019-09-26 PROCEDURE — 63600175 PHARM REV CODE 636 W HCPCS: Performed by: PSYCHIATRY & NEUROLOGY

## 2019-09-26 PROCEDURE — S0028 INJECTION, FAMOTIDINE, 20 MG: HCPCS | Performed by: PSYCHIATRY & NEUROLOGY

## 2019-09-26 PROCEDURE — 96367 TX/PROPH/DG ADDL SEQ IV INF: CPT

## 2019-09-26 PROCEDURE — 96365 THER/PROPH/DIAG IV INF INIT: CPT

## 2019-09-26 PROCEDURE — 25000003 PHARM REV CODE 250: Performed by: PSYCHIATRY & NEUROLOGY

## 2019-09-26 RX ORDER — METHYLPREDNISOLONE SOD SUCC 125 MG
100 VIAL (EA) INJECTION
Status: COMPLETED | OUTPATIENT
Start: 2019-09-26 | End: 2019-09-26

## 2019-09-26 RX ORDER — FAMOTIDINE 10 MG/ML
20 INJECTION INTRAVENOUS
Status: COMPLETED | OUTPATIENT
Start: 2019-09-26 | End: 2019-09-26

## 2019-09-26 RX ORDER — SODIUM CHLORIDE 0.9 % (FLUSH) 0.9 %
10 SYRINGE (ML) INJECTION
Status: CANCELLED | OUTPATIENT
Start: 2020-02-27

## 2019-09-26 RX ORDER — ACETAMINOPHEN 500 MG
1000 TABLET ORAL
Status: COMPLETED | OUTPATIENT
Start: 2019-09-26 | End: 2019-09-26

## 2019-09-26 RX ORDER — SODIUM CHLORIDE 0.9 % (FLUSH) 0.9 %
10 SYRINGE (ML) INJECTION
Status: DISCONTINUED | OUTPATIENT
Start: 2019-09-26 | End: 2019-09-26 | Stop reason: HOSPADM

## 2019-09-26 RX ORDER — ACETAMINOPHEN 500 MG
1000 TABLET ORAL
Status: CANCELLED | OUTPATIENT
Start: 2020-02-27

## 2019-09-26 RX ORDER — METHYLPREDNISOLONE SOD SUCC 125 MG
100 VIAL (EA) INJECTION
Status: CANCELLED
Start: 2020-02-27

## 2019-09-26 RX ORDER — HEPARIN 100 UNIT/ML
100 SYRINGE INTRAVENOUS
Status: CANCELLED | OUTPATIENT
Start: 2020-02-27

## 2019-09-26 RX ORDER — FAMOTIDINE 10 MG/ML
20 INJECTION INTRAVENOUS
Status: CANCELLED | OUTPATIENT
Start: 2020-02-27

## 2019-09-26 RX ADMIN — FAMOTIDINE 20 MG: 10 INJECTION INTRAVENOUS at 09:09

## 2019-09-26 RX ADMIN — METHYLPREDNISOLONE SODIUM SUCCINATE 100 MG: 125 INJECTION, POWDER, FOR SOLUTION INTRAMUSCULAR; INTRAVENOUS at 09:09

## 2019-09-26 RX ADMIN — ACETAMINOPHEN 1000 MG: 500 TABLET ORAL at 09:09

## 2019-09-26 RX ADMIN — OCRELIZUMAB 600 MG: 300 INJECTION INTRAVENOUS at 09:09

## 2019-09-26 RX ADMIN — DIPHENHYDRAMINE HYDROCHLORIDE 50 MG: 50 INJECTION, SOLUTION INTRAMUSCULAR; INTRAVENOUS at 09:09

## 2019-09-26 NOTE — DISCHARGE INSTRUCTIONS
FALL PREVENTION   Falls often occur due to slipping, tripping or losing your balance. Here are ways to reduce your risk of falling again.   Was there anything that caused your fall that can be fixed, removed or replaced?   Make your home safe by keeping walkways clear of objects you may trip over.   Use non-slip pads under rugs.   Do not walk in poorly lit areas.   Do not stand on chairs or wobbly ladders.   Use caution when reaching overhead or looking upward. This position can cause a loss of balance.   Be sure your shoes fit properly, have non-slip bottoms and are in good condition.   Be cautious when going up and down stairs, curbs, and when walking on uneven sidewalks.   If your balance is poor, consider using a cane or walker.   If your fall was related to alcohol use, stop or limit alcohol intake.   If your fall was related to use of sleeping medicines, talk to your doctor about this. You may need to reduce your dosage at bedtime if you awaken during the night to go to the bathroom.   To reduce the need for nighttime bathroom trips:   Avoid drinking fluids for several hours before going to bed   Empty your bladder before going to bed   Men can keep a urinal at the bedside   © 4150-6162 [facility], [address] [place] [address], [place] [id number]. All rights reserved. This information is not intended as a substitute for professional medical care. Always follow your healthcare professional's instructions.  WAYS TO HELP PREVENT INFECTION         WASH YOUR HANDS OFTEN DURING THE DAY, ESPECIALLY BEFORE YOU EAT, AFTER USING THE BATHROOM, AND AFTER TOUCHING ANIMALS     STAY AWAY FROM PEOPLE WHO HAVE ILLNESSES YOU CAN CATCH; SUCH AS COLDS, FLU, CHICKEN POX     TRY TO AVOID CROWDS     STAY AWAY FROM CHILDREN WHO RECENTLY HAVE RECEIVED LIVE VIRUS VACCINES     MAINTAIN GOOD MOUTH CARE     DO NOT SQUEEZE OR SCRATCH PIMPLES     CLEAN CUTS & SCRAPES RIGHT AWAY AND DAILY UNTIL HEALED WITH WARM WATER, SOAP & AN  ANTISEPTIC     AVOID CONTACT WITH LITTER BOXES, BIRD CAGES, & FISH TANKS     AVOID STANDING WATER, IE., BIRD BATHS, FLOWER POTS/VASES, OR HUMIDIFIERS     WEAR GLOVES WHEN GARDENING OR CLEANING UP AFTER OTHERS, ESPECIALLY BABIES & SMALL CHILDREN     DO NOT EAT RAW FISH, SEAFOOD, MEAT, OR EGGS

## 2019-09-27 NOTE — TELEPHONE ENCOUNTER
DOCUMENTATION ONLY:  Prior authorization for Ampyra approved from 9/27/19 to 9/27/20    Case Id: [id number]    Co-pay: $0    Patient Assistance is not required

## 2019-09-27 NOTE — TELEPHONE ENCOUNTER
DOCUMENTATION ONLY:  Prior Authorization for Dalfampridine submitted to patient's insurance company.

## 2019-10-01 ENCOUNTER — PATIENT MESSAGE (OUTPATIENT)
Dept: PHARMACY | Facility: CLINIC | Age: 39
End: 2019-10-01

## 2019-10-01 DIAGNOSIS — G35 MULTIPLE SCLEROSIS: Primary | ICD-10-CM

## 2019-10-01 DIAGNOSIS — R26.9 GAIT DISTURBANCE: ICD-10-CM

## 2019-10-02 RX ORDER — DALFAMPRIDINE 10 MG/1
10 TABLET, FILM COATED, EXTENDED RELEASE ORAL 2 TIMES DAILY
Qty: 180 TABLET | Refills: 0 | Status: SHIPPED | OUTPATIENT
Start: 2019-10-02 | End: 2020-04-16

## 2019-11-19 ENCOUNTER — PATIENT MESSAGE (OUTPATIENT)
Dept: NEUROLOGY | Facility: CLINIC | Age: 39
End: 2019-11-19

## 2019-11-19 DIAGNOSIS — R26.9 GAIT DISTURBANCE: ICD-10-CM

## 2019-11-19 DIAGNOSIS — G35 MULTIPLE SCLEROSIS: Primary | ICD-10-CM

## 2019-12-03 ENCOUNTER — TELEPHONE (OUTPATIENT)
Dept: NEUROLOGY | Facility: CLINIC | Age: 39
End: 2019-12-03

## 2019-12-09 ENCOUNTER — TELEPHONE (OUTPATIENT)
Dept: REHABILITATION | Facility: HOSPITAL | Age: 39
End: 2019-12-09

## 2019-12-10 ENCOUNTER — TELEPHONE (OUTPATIENT)
Dept: REHABILITATION | Facility: HOSPITAL | Age: 39
End: 2019-12-10

## 2019-12-12 ENCOUNTER — CLINICAL SUPPORT (OUTPATIENT)
Dept: REHABILITATION | Facility: HOSPITAL | Age: 39
End: 2019-12-12
Payer: MEDICAID

## 2019-12-12 DIAGNOSIS — R26.9 GAIT DISTURBANCE: Primary | ICD-10-CM

## 2019-12-12 PROCEDURE — 97161 PT EVAL LOW COMPLEX 20 MIN: CPT

## 2019-12-12 PROCEDURE — 97110 THERAPEUTIC EXERCISES: CPT

## 2019-12-12 NOTE — PLAN OF CARE
[facility]  Physical Therapy Initial Evaluation    Name: Larry Hinds  Clinic Number: [id number]    Therapy Diagnosis:   Encounter Diagnosis   Name Primary?    Gait disturbance Yes     Physician: Nelly Fernandez APRN,*    Physician Orders: PT Eval and Treat   Medical Diagnosis from Referral: Multiple Sclerosis  Evaluation Date: 12/12/2019  Authorization Period Expiration: 11/18/2020  Plan of Care Expiration: 1/10/2019  Visit # / Visits authorized: 1/ 1    Time In: 1:00 pm  Time Out: 2:00pm  Total Billable Time: 15 minutes    Precautions: Standard    Subjective   Date of onset: 6 years ago dx with MS  History of current condition - Larry reports: has not worked for 1 year due to progressive weakness from MS. He reports that he has Relapse Remitting MS but feels like he has the progressive type.      Medical History:   Past Medical History:   Diagnosis Date    Multiple sclerosis     Transverse myelitis        Surgical History:   Larry Hinds  has no past surgical history on file.    Medications:   Larry has a current medication list which includes the following prescription(s): ampyra, ascorbic acid (vitamin c), b complex vitamins, biotin, cholecalciferol (vitamin d3), coenzyme q10, cyanocobalamin, multivitamin, olive leaf extract, omega-3 acid ethyl esters, turmeric, UNABLE TO FIND, UNABLE TO FIND, UNABLE TO FIND, UNABLE TO FIND, and UNABLE TO FIND.    Allergies:   Review of patient's allergies indicates:   Allergen Reactions    Cat's claw (uncaria tomentosa)     Cat/feline products         Prior Therapy: none  Social History:  lives alone  Occupation: disabled  Prior Level of Function: difficulty walking  Current Level of Function: more difficulty walking as compared to July 2019    Pain:  Current 3/10, worst 3/10, best 3/10   Location: all over   Description: Aching  Aggravating Factors: no particular activity  Easing Factors: stretching    Pts goals: get stronger, walk  better, improve balance, get back to work if possible, go to a yoga class    Objective     RLE strength: hip flexion 1+/5, hip abduction 2-/5, hip adduction 1+/5, hip extension 2-/5, knee extension 3/5, knee flexion 3/5, ankle 4/5  RLE ROM: hamstring length 50 degrees, Hip ER limited 50%, Hip flexion limited 25%    LLE strength: hip flexion 3+/5, hip abduction 3/5, hip adduction 2/5, hip extension 3-/5, knee extension 4+/5, knee flexion 3/5, ankle 4/5  LLE ROM: hamstring length 55 degrees, Hip ER limited 50%, Hip flexion limited 25%    Unilateral stance: unable BLE    Tandem stance: 0 sec BLE    Plank on elbow time: 9 seconds  :  Plank on hands time: 7 seconds    Gait pattern: impaired BLE step length, stance time, heel strike, hip flexion; scissor gait at times      CMS Impairment/Limitation/Restriction for FOTO neuro disorder Survey    Therapist reviewed FOTO scores for Larry Hinds on 12/12/2019.   FOTO documents entered into [employer] - see Media section.    Limitation Score: 55%  Category: Mobility    Current : CK = at least 40% but < 60% impaired, limited or restricted  Goal: CK = at least 40% but < 60% impaired, limited or restricted  Discharge: n/a         TREATMENT   Treatment Time In: 1:45 pm  Treatment Time Out: 2:00 pm  Total Treatment time separate from Evaluation: 15 minutes    Larry received therapeutic exercises to develop flexibility for 15 minutes including:  Piriformis stretch  Figure four stretch  Windshield wiper in supine and prone  Cat-cow stretch  Child's pose stretch    Home Exercises and Patient Education Provided    Education provided:   - home program    Written Home Exercises Provided: yes.  Exercises were reviewed and Larry was able to demonstrate them prior to the end of the session.  Larry demonstrated good  understanding of the education provided.     See EMR under Patient Instructions for exercises provided 12/12/2019.    Assessment   Larry is a 39 y.o. male referred to outpatient  Physical Therapy with a medical diagnosis of MS. Pt presents with impaired balance, gait pattern, impaired strength R side greater than L, impaired core strength, impaired BLE ROM, general aching all over, hx of falls    Pt prognosis is Good.   Pt will benefit from skilled outpatient Physical Therapy to address the deficits stated above and in the chart below, provide pt/family education, and to maximize pt's level of independence.     Plan of care discussed with patient: Yes  Pt's spiritual, cultural and educational needs considered and patient is agreeable to the plan of care and goals as stated below:     Anticipated Barriers for therapy: none    Medical Necessity is demonstrated by the following  History  Co-morbidities and personal factors that may impact the plan of care Co-morbidities:   MS    Personal Factors:   no deficits     low   Examination  Body Structures and Functions, activity limitations and participation restrictions that may impact the plan of care Body Regions:   lower extremities  upper extremities  trunk    Body Systems:    ROM  strength  balance  gait    Participation Restrictions:   none    Activity limitations:   Learning and applying knowledge  no deficits    General Tasks and Commands  no deficits    Communication  no deficits    Mobility  lifting and carrying objects  fine hand use (grasping/picking up)  walking    Self care  no deficits    Domestic Life  shopping  cooking  doing house work (cleaning house, washing dishes, laundry)  assisting others    Interactions/Relationships  no deficits    Life Areas  no deficits    Community and Social Life  no deficits         low   Clinical Presentation stable and uncomplicated low   Decision Making/ Complexity Score: low     Goals:  Short Term Goals: 4 weeks   1. Increase in BLE hip and knee strength by 1/2 grade  2. Increase in BLE hamstring length and hip ER and flexion by 20 degrees  3. Maintain BLE tandem stance for 3-5 seconds  4. Improve  neurological disorder survey to less than 40% disabled    Long Term Goals: 8 weeks   1. Increase BLE hip and knee strength by 1 grade throughout  2. Perform unilateral stance BLE to 2 seconds and tandem stance for 10 seconds    Plan   Plan of care Certification: 12/12/2019 to 1/10/2019.    Outpatient Physical Therapy 2 times weekly for 8 weeks to include the following interventions: Electrical Stimulation IFC, TENS, Manual Therapy, Moist Heat/ Ice, Neuromuscular Re-ed, Patient Education, Self Care and Therapeutic Exercise.     Marlen Suárez, PT

## 2019-12-18 ENCOUNTER — PATIENT MESSAGE (OUTPATIENT)
Dept: NEUROLOGY | Facility: CLINIC | Age: 39
End: 2019-12-18

## 2019-12-18 ENCOUNTER — OFFICE VISIT (OUTPATIENT)
Dept: NEUROLOGY | Facility: CLINIC | Age: 39
End: 2019-12-18
Payer: MEDICAID

## 2019-12-18 VITALS
HEART RATE: 105 BPM | DIASTOLIC BLOOD PRESSURE: 87 MMHG | HEIGHT: 73 IN | WEIGHT: 175.81 LBS | SYSTOLIC BLOOD PRESSURE: 120 MMHG | BODY MASS INDEX: 23.3 KG/M2

## 2019-12-18 DIAGNOSIS — G35 MULTIPLE SCLEROSIS: Primary | ICD-10-CM

## 2019-12-18 DIAGNOSIS — R26.9 GAIT DISTURBANCE: ICD-10-CM

## 2019-12-18 PROCEDURE — 99215 OFFICE O/P EST HI 40 MIN: CPT | Mod: S$PBB,,, | Performed by: CLINICAL NURSE SPECIALIST

## 2019-12-18 PROCEDURE — 99999 PR PBB SHADOW E&M-EST. PATIENT-LVL IV: ICD-10-PCS | Mod: PBBFAC,,, | Performed by: CLINICAL NURSE SPECIALIST

## 2019-12-18 PROCEDURE — 99214 OFFICE O/P EST MOD 30 MIN: CPT | Mod: PBBFAC | Performed by: CLINICAL NURSE SPECIALIST

## 2019-12-18 PROCEDURE — 99215 PR OFFICE/OUTPT VISIT, EST, LEVL V, 40-54 MIN: ICD-10-PCS | Mod: S$PBB,,, | Performed by: CLINICAL NURSE SPECIALIST

## 2019-12-18 PROCEDURE — 99999 PR PBB SHADOW E&M-EST. PATIENT-LVL IV: CPT | Mod: PBBFAC,,, | Performed by: CLINICAL NURSE SPECIALIST

## 2019-12-18 NOTE — PROGRESS NOTES
Subjective:       Patient ID: Larry Hinds is a 39 y.o. male who presents today for a routine clinic visit for MS.  He was last seen by Dr. Bella in September 2019. The history has been provided by the patient. He is accompanied by his father.     MS HPI:  · DMT: Ocrevus--next infusion is due in March 2020  · Side effects from DMT? Yes - Headaches and rash   · Taking vitamin D3 as recommended? Yes -  Dose: 10,000 units daily   · Dizziness has improved.   · He stopped Ampyra. It increased his numbness and made him feel burning. He also had headaches.   · He recalls that he also had a bad headache with modafinil.   · He just started PT and has a second session tomorrow.   · He is not doing much exercise at home because it is hard to get down to the gym in his building. He wonders if a rollator would be helpful so that he could walk down to the gym.   He does not feel like Ocrevus is helping. He is not able to walk far. He cannot squat down without needing help to get up. He feels like he walks better here in our clinic than he does at other places. In general, he feels a sense of worsening.     SOCIAL HISTORY  Social History     Tobacco Use    Smoking status: Never Smoker    Smokeless tobacco: Never Used   Substance Use Topics    Alcohol use: No     Frequency: 2-4 times a month     Drinks per session: 3 or 4     Binge frequency: Never    Drug use: No     Living arrangements - the patient lives alone.  Employment: Receives SSDI    MS REVIEW OF SYMPTOMS 12/16/2019   Do you feel abnormally tired on most days? Yes   Do you feel you generally sleep well? Yes--some nights are better than others    Do you have difficulty controlling your bladder?  Yes--some hesitancy; does not feel like he is emptying completely; does not get UTIs    Do you have difficulty controlling your bowels?  No   Do you have frequent muscle cramps, tightness or spasms in your limbs?  Yes--has shaking in his legs when he is stretching   Do you  have new visual symptoms?  No--if overheated    Do you have worsening difficulty with your memory or thinking? No   Do you have worsening symptoms of anxiety or depression?  No   For patients who walk, Do you have more difficulty walking?  Yes   Have you fallen since your last visit?  No   For patients who use wheelchairs: Do you have any skin wounds or breakdown? Not Applicable   Do you have difficulty using your hands?  Yes--numbness in hands interferes with activities   Do you have shooting or burning pain? Yes   Do you have difficulty with sexual function?  Yes   If you are sexually active, are you using birth control? Y/N  N/A Not Applicable   Do you often choke when swallowing liquids or solid food?  No   Do you experience worsening symptoms when overheated? Yes; not cold sensitive    Do you need any new equipment such as a wheelchair, walker or shower chair? Yes; maybe interested in scooter or rollator    Do you receive co-pay financial assistance for your principal MS medicine? Yes   Would you be interested in participating in an MS research trial in the future? Yes   Do you feel you have adequate family/friend support?  Yes   Do you have health insurance?   Yes   Are you currently employed? No   Do you receive SSDI/SSI?  Yes   Do you use marijuana or cannabis products? No   Have you been diagnosed with a urinary tract infection since your last visit here? No   Have you been diagnosed with a respiratory tract infection since your last visit here? No   Have you been to the emergency room since your last visit here? No   Have you been hospitalized since your last visit here?  No           Objective:        1. 25 foot timed walk: 10.9 seconds today without assist (unsteady);  was 8.9 seconds at last visit without assist     Neurologic Exam     Mental Status   Oriented to person, place, and time.   Attention: normal. Concentration: normal.   Speech: speech is normal   Level of consciousness: alert  Knowledge:  good.   Normal comprehension.     Cranial Nerves     CN II   Visual acuity: normal    CN III, IV, VI   Pupils are equal, round, and reactive to light.  Extraocular motions are normal.   Nystagmus: none     CN V   Facial sensation intact.     CN VII   Facial expression full, symmetric.     CN XI   CN XI normal.     CN XII   CN XII normal.     Motor Exam   Muscle bulk: normal    Strength   Right deltoid: 4/5  Left deltoid: 5/5  Right biceps: 5/5  Left biceps: 5/5  Right triceps: 4/5  Left triceps: 5/5  Right wrist extension: 4/5  Left wrist extension: 5/5  Right interossei: 4/5  Left interossei: 5/5  Right iliopsoas: 2/5  Left iliopsoas: 4/5  Right quadriceps: 3/5  Left quadriceps: 4/5  Right hamstring: 3/5  Left hamstrin/5  Right anterior tibial: 3/5  Left anterior tibial: 5/5  Right gastroc: 4/5  Left gastroc: 5/5    Sensory Exam   Light touch normal.   Right arm vibration: decreased from fingers  Left arm vibration: normal  Right leg vibration: decreased from ankle  Left leg vibration: decreased from ankle  He does not feel vibration in bilateral ankles or toes; proprioception is impaired on 1 toe of the right foot      Gait, Coordination, and Reflexes     Gait  Gait: spastic (unsteady)    Coordination   Positive Romberg's test: mild sway   Finger to nose coordination: abnormal (mild dysmetria on FTN bilaterally )  Heel to shin coordination: abnormal (unable with right heel to left shin )  Tandem walking coordination: abnormal (needs some assistance from provider )    Reflexes   Right brachioradialis: 2+  Left brachioradialis: 2+  Right biceps: 2+  Left biceps: 2+  Right patellar: 2+  Left patellar: 2+  Right achilles: 2+  Left achilles: 2+  Right plantar: equivocal  Left plantar: equivocal  Slow RSM in both hands and feet           Imaging:     Results for orders placed during the hospital encounter of 09/10/19   MRI Brain Demyelinating W W/O Contrast    Impression There is no significant change compared to  the MRI from February 2019.  T2 FLAIR hyperintense white matter lesions are consistent with demyelinating plaques and multiple sclerosis.  No abnormal enhancement as may occur with active demyelination.      Electronically signed by: Elliot Kramer Jr., MD  Date:    09/10/2019  Time:    11:34       Labs:     Lab Results   Component Value Date    GSZRTTSX05MD 51 08/30/2019    IHNBFKJI45XS 50 02/12/2019     Lab Results   Component Value Date    JCVINDEX 2.91 (A) 02/12/2019    JCVANTIBODY Positive (A) 02/12/2019     Lab Results   Component Value Date    VT5GCLNQ 73.0 02/12/2019    ABSOLUTECD3 1547 02/12/2019    OF2LCXZW 21.9 02/12/2019    ABSOLUTECD8 465 02/12/2019    PT7FPLIF 46.9 02/12/2019    ABSOLUTECD4 994 02/12/2019    LABCD48 2.14 02/12/2019     Lab Results   Component Value Date    WBC 6.74 08/30/2019    RBC 5.27 08/30/2019    HGB 16.0 08/30/2019    HCT 48.5 08/30/2019    MCV 92 08/30/2019    MCH 30.4 08/30/2019    MCHC 33.0 08/30/2019    RDW 11.5 08/30/2019     08/30/2019    MPV 10.2 08/30/2019    GRAN 3.8 08/30/2019    GRAN 56.6 08/30/2019    LYMPH 1.8 08/30/2019    LYMPH 27.2 08/30/2019    MONO 0.6 08/30/2019    MONO 9.3 08/30/2019    EOS 0.4 08/30/2019    BASO 0.09 08/30/2019    EOSINOPHIL 5.2 08/30/2019    BASOPHIL 1.3 08/30/2019     Sodium   Date Value Ref Range Status   02/12/2019 140 136 - 145 mmol/L Final     Potassium   Date Value Ref Range Status   02/12/2019 4.2 3.5 - 5.1 mmol/L Final     Chloride   Date Value Ref Range Status   02/12/2019 102 95 - 110 mmol/L Final     CO2   Date Value Ref Range Status   02/12/2019 28 23 - 29 mmol/L Final     Glucose   Date Value Ref Range Status   02/12/2019 97 70 - 110 mg/dL Final     BUN, Bld   Date Value Ref Range Status   02/12/2019 17 6 - 20 mg/dL Final     Creatinine   Date Value Ref Range Status   09/10/2019 1.2 0.5 - 1.4 mg/dL Final     Calcium   Date Value Ref Range Status   02/12/2019 10.2 8.7 - 10.5 mg/dL Final     Total Protein   Date Value Ref  Range Status   02/12/2019 8.3 6.0 - 8.4 g/dL Final     Albumin   Date Value Ref Range Status   02/12/2019 4.5 3.5 - 5.2 g/dL Final     Total Bilirubin   Date Value Ref Range Status   02/12/2019 0.6 0.1 - 1.0 mg/dL Final     Comment:     For infants and newborns, interpretation of results should be based  on gestational age, weight and in agreement with clinical  observations.  Premature Infant recommended reference ranges:  Up to 24 hours.............<8.0 mg/dL  Up to 48 hours............<12.0 mg/dL  3-5 days..................<15.0 mg/dL  6-29 days.................<15.0 mg/dL       Alkaline Phosphatase   Date Value Ref Range Status   02/12/2019 58 55 - 135 U/L Final     AST   Date Value Ref Range Status   02/12/2019 13 10 - 40 U/L Final     ALT   Date Value Ref Range Status   02/12/2019 16 10 - 44 U/L Final     Anion Gap   Date Value Ref Range Status   02/12/2019 10 8 - 16 mmol/L Final     eGFR if    Date Value Ref Range Status   09/10/2019 >60 >60 mL/min/1.73 m^2 Final     eGFR if non    Date Value Ref Range Status   09/10/2019 >60 >60 mL/min/1.73 m^2 Final     Comment:     Calculation used to obtain the estimated glomerular filtration  rate (eGFR) is the CKD-EPI equation.          Diagnosis/Assessment/Plan:    1. Multiple Sclerosis  · Assessment: Larry continues to feel a sense of worsening on Ocrevus. His walk time is slower today, and I sense that his right side is a bit weaker than at last visit. He seemed more unsteady, in general. He does not want to proceed with future Ocrevus treatments. We may consider Mavenclad or Lemtrada for him.   · Imaging: MRI brain and spine in September 2020   · Disease Modifying Therapies: As above; he will come back to clinic in February to discuss these medications in more detail. In the meantime, he will be vigilant about taking Vitamin D. Will hold on Ocrevus labs for now.   2. MS Symptom Assessment / Management  · Fatigue: Continue  Co-Q-10  · Bladder Dysfunction: Will monitor; may refer to urology   · Spasticity: Will monitor for now; concerned about muscle relaxers making him weaker   · Mood Disorder: Discussed counseling with Jarod Flores LCSW. He will think about this.   · Gait Disturbance: Continue PT  · Adaptive needs: He is thinking about a rollator or scooter. I think this is very reasonable given his slower walk time and unsteady gait.     Our visit today lasted 40 minutes, and 100% of this time was spent face to face with the patient. Over 50% of this visit included discussion of the treatment plan/medication changes/symptom management/exam findings/coordination of care. The patient agrees with the plan of care. He will follow up with Dr. Bella in February.     Nelly Fernandez, AGCNS-BC, MSCN      Problem List Items Addressed This Visit     None      Visit Diagnoses     Multiple sclerosis    -  Primary    Relevant Orders    CBC auto differential    Rituxan Sensitivity    Hepatitis B core antibody, total    Hepatitis B surface antibody    Hepatitis B surface antigen    Vitamin D

## 2019-12-19 ENCOUNTER — DOCUMENTATION ONLY (OUTPATIENT)
Dept: NEUROLOGY | Facility: CLINIC | Age: 39
End: 2019-12-19

## 2019-12-19 ENCOUNTER — CLINICAL SUPPORT (OUTPATIENT)
Dept: REHABILITATION | Facility: HOSPITAL | Age: 39
End: 2019-12-19
Payer: MEDICAID

## 2019-12-19 DIAGNOSIS — R26.9 GAIT DISTURBANCE: Primary | ICD-10-CM

## 2019-12-19 PROCEDURE — 97110 THERAPEUTIC EXERCISES: CPT

## 2019-12-19 NOTE — PROGRESS NOTES
Physical Therapy Daily Treatment Note     Name: Larry Hinds  Clinic Number: [id number]    Therapy Diagnosis:   Encounter Diagnosis   Name Primary?    Gait disturbance Yes     Physician: Nelly Fernandez, APRN,*    Visit Date: 12/19/2019     Physician Orders: PT Eval and Treat   Medical Diagnosis from Referral: Multiple Sclerosis  Evaluation Date: 12/12/2019  Authorization Period Expiration: 11/18/2020  Plan of Care Expiration: 1/10/2019  Visit # / Visits authorized: 2/13    Time In: 1:00pm  Time Out: 2:00pm  Total Billable Time: 60 minutes    Precautions: Standard and Fall    Subjective     Pt reports: feeling tight in legs but feeling stronger today  He was compliant with home exercise program.  Response to previous treatment: good  Functional change: none     Pain: 0/10  Location:  n/a     Objective     Larry received therapeutic exercises to develop strength, endurance and flexibility for 60 minutes including:  elliptical x 3'  Piriformis stretch  Figure four stretch  Windshield wiper in supine and prone  Cat-cow stretch  Child's pose stretch  Superman alternating UE/LE  Hamstring curls with 4# (prone)  Plank x 15 seconds  Dead bug  SLR  Unilateral bridge  Manual stretching performed BLE (hamstring, hip ER)  (sidelyiing hip exercises to be added at next visit)    Larry received the following manual therapy techniques:  were applied to the: - for - minutes, including:    Larry participated in neuromuscular re-education activities to improve:  for - minutes. The following activities were included:    Home Exercises Provided and Patient Education Provided     Education provided:   - home program updated    Written Home Exercises Provided: yes.  Exercises were reviewed and Larry was able to demonstrate them prior to the end of the session.  Larry demonstrated good  understanding of the education provided.     See EMR under Patient Instructions for exercises provided 12/19/2019 and prior visit    Assessment      Osman felt good about his PT session today. It was helpful for his LEs being manually stretched prior to strengthening exercises. He was able to hold a plank position x 15 seconds (6 seconds longer than the eval)  Larry is progressing well towards his goals.   Pt prognosis is Good.     Pt will continue to benefit from skilled outpatient physical therapy to address the deficits listed in the problem list box on initial evaluation, provide pt/family education and to maximize pt's level of independence in the home and community environment.     Pt's spiritual, cultural and educational needs considered and pt agreeable to plan of care and goals.     Anticipated barriers to physical therapy: none    Goals:     Short Term Goals: 4 weeks   1. Increase in BLE hip and knee strength by 1/2 grade  2. Increase in BLE hamstring length and hip ER and flexion by 20 degrees  3. Maintain BLE tandem stance for 3-5 seconds  4. Improve neurological disorder survey to less than 40% disabled     Long Term Goals: 8 weeks   1. Increase BLE hip and knee strength by 1 grade throughout  2. Perform unilateral stance BLE to 2 seconds and tandem stance for 10 seconds     Plan   Plan of care Certification: 12/12/2019 to 1/10/2019.     Outpatient Physical Therapy 2 times weekly to include the following interventions: Electrical Stimulation IFC, TENS, Manual Therapy, Moist Heat/ Ice, Neuromuscular Re-ed, Patient Education, Self Care and Therapeutic Exercise.     Marlen Suárez, PT

## 2019-12-20 NOTE — PROGRESS NOTES
Faxed appeal, denial letter, and most recent progress notes to [facility] at [phone].

## 2019-12-23 ENCOUNTER — CLINICAL SUPPORT (OUTPATIENT)
Dept: REHABILITATION | Facility: HOSPITAL | Age: 39
End: 2019-12-23
Payer: MEDICAID

## 2019-12-23 DIAGNOSIS — R26.9 GAIT DISTURBANCE: Primary | ICD-10-CM

## 2019-12-23 PROCEDURE — 97110 THERAPEUTIC EXERCISES: CPT

## 2019-12-23 NOTE — PROGRESS NOTES
Physical Therapy Daily Treatment Note     Name: Larry Hinds  Clinic Number: [id number]    Therapy Diagnosis:   Encounter Diagnosis   Name Primary?    Gait disturbance Yes     Physician: Nelly eFrnandez, APRN,*    Visit Date: 12/23/2019     Physician Orders: PT Eval and Treat   Medical Diagnosis from Referral: Multiple Sclerosis  Evaluation Date: 12/12/2019  Authorization Period Expiration: 11/18/2020  Plan of Care Expiration: 1/10/2019  Visit # / Visits authorized: 3/13    Time In: 10:00am  Time Out: 11:10  Total Billable Time: 70 minutes    Precautions: Standard and Fall    Subjective     Pt reports: no complaints after last session.   He was compliant with home exercise program.  Response to previous treatment: good  Functional change: none     Pain: 0/10  Location:  n/a     Objective     Larry received therapeutic exercises to develop strength, endurance and flexibility for 70 minutes including:  elliptical x 3'  Piriformis stretch  Figure four stretch  1/2 knee lunge (needs assist for balance)  Windshield wiper in supine and prone  Cat-cow stretch  Child's pose stretch  Superman alternating UE/LE  Hamstring curls with 4# (prone)  Plank x 15 seconds- not today  Dead bug  Bird Dog (needs practice)  SLR + PPT: 2# RLE, 4# LLE- not today  SL clamshell, reverse clam, abduction LLE  Supine ER with RTB and abduction (no band)  Unilateral bridge  Manual stretching performed BLE (hamstring, hip ER)    Larry received the following manual therapy techniques:  were applied to the: - for - minutes, including: see there ex section    Larry participated in neuromuscular re-education activities to improve:  for - minutes. The following activities were included:    Home Exercises Provided and Patient Education Provided     Education provided:   - home program updated    Written Home Exercises Provided: yes.  Exercises were reviewed and Larry was able to demonstrate them prior to the end of the session.  Larry  demonstrated good  understanding of the education provided.     See EMR under Patient Instructions for exercises provided 12/23/2019 and prior visit and prior visit    Assessment     Osman felt good after today's session. He felt less tight which allows him to walk better  Larry is progressing well towards his goals.   Pt prognosis is Good.     Pt will continue to benefit from skilled outpatient physical therapy to address the deficits listed in the problem list box on initial evaluation, provide pt/family education and to maximize pt's level of independence in the home and community environment.     Pt's spiritual, cultural and educational needs considered and pt agreeable to plan of care and goals.     Anticipated barriers to physical therapy: none    Goals:     Short Term Goals: 4 weeks   1. Increase in BLE hip and knee strength by 1/2 grade  2. Increase in BLE hamstring length and hip ER and flexion by 20 degrees  3. Maintain BLE tandem stance for 3-5 seconds  4. Improve neurological disorder survey to less than 40% disabled     Long Term Goals: 8 weeks   1. Increase BLE hip and knee strength by 1 grade throughout  2. Perform unilateral stance BLE to 2 seconds and tandem stance for 10 seconds     Plan   Plan of care Certification: 12/12/2019 to 1/10/2019.     Outpatient Physical Therapy 2 times weekly to include the following interventions: Electrical Stimulation IFC, TENS, Manual Therapy, Moist Heat/ Ice, Neuromuscular Re-ed, Patient Education, Self Care and Therapeutic Exercise.     Marlen Suárez, PT

## 2019-12-24 ENCOUNTER — PATIENT MESSAGE (OUTPATIENT)
Dept: NEUROLOGY | Facility: CLINIC | Age: 39
End: 2019-12-24

## 2019-12-26 ENCOUNTER — CLINICAL SUPPORT (OUTPATIENT)
Dept: REHABILITATION | Facility: HOSPITAL | Age: 39
End: 2019-12-26
Payer: MEDICAID

## 2019-12-26 DIAGNOSIS — R26.9 GAIT DISTURBANCE: Primary | ICD-10-CM

## 2019-12-26 PROCEDURE — 97110 THERAPEUTIC EXERCISES: CPT

## 2019-12-26 NOTE — PROGRESS NOTES
Physical Therapy Daily Treatment Note     Name: Larry Hinds  Clinic Number: [id number]    Therapy Diagnosis:   Encounter Diagnosis   Name Primary?    Gait disturbance Yes     Physician: Nelly Fernandez APRN,*    Visit Date: 12/26/2019     Physician Orders: PT Eval and Treat   Medical Diagnosis from Referral: Multiple Sclerosis  Evaluation Date: 12/12/2019  Authorization Period Expiration: 11/18/2020  Plan of Care Expiration: 1/10/2019  Visit # / Visits authorized: 4/13    Time In: 11:00am  Time Out: 12:15pm  Total Billable Time: 45 minutes    Precautions: Standard and Fall    Subjective     Pt reports: legs aren't feeling as strong today, balance is not too good  He was compliant with home exercise program.  Response to previous treatment: good  Functional change: none     Pain: 0/10  Location:  n/a     Objective     Larry received therapeutic exercises to develop strength, endurance and flexibility for 75 minutes including:  elliptical x 2' - unable to tolerate 3'  Manual stretching performed BLE (hamstring, hip ER)  Piriformis stretch  Figure four stretch  1/2 knee lunge (needs assist for balance)- not today  Windshield wiper in supine and prone  Cat-cow stretch- not today  Child's pose stretch-not today  Superman alternating UE/LE  Hamstring curls with 4# (prone)  Plank x 15 seconds- not today  Dead bug- not today  DKTC, bridging and trunk rotation with green stability ball  Bird Dog (needs practice)- not today  SLR + PPT: 2# RLE, 4# LLE  SL clamshell, reverse clam, abduction LLE- not today  Supine ER with RTB and abduction (no band)- not today  Unilateral bridge- not today  Matrix rows 70#  Matrix chest press 35#  Matrix triceps 75#  Matrix biceps 50#    Larry received the following manual therapy techniques:  were applied to the: - for - minutes, including: see there ex section    Larry participated in neuromuscular re-education activities to improve:  for - minutes. The following activities were  included:    Home Exercises Provided and Patient Education Provided     Education provided:   - home program updated    Written Home Exercises Provided: yes.  Exercises were reviewed and Larry was able to demonstrate them prior to the end of the session.  Larry demonstrated good  understanding of the education provided.     See EMR under Patient Instructions for exercises provided 12/23/2019 and prior visit and prior visit    Assessment     Osman was limited on which exercises he could perform today. Part of today's session emphasized UE strengthening. His LE exercises were modified.  Larry is progressing well towards his goals.   Pt prognosis is Good.     Pt will continue to benefit from skilled outpatient physical therapy to address the deficits listed in the problem list box on initial evaluation, provide pt/family education and to maximize pt's level of independence in the home and community environment.     Pt's spiritual, cultural and educational needs considered and pt agreeable to plan of care and goals.     Anticipated barriers to physical therapy: none    Goals:     Short Term Goals: 4 weeks   1. Increase in BLE hip and knee strength by 1/2 grade  2. Increase in BLE hamstring length and hip ER and flexion by 20 degrees  3. Maintain BLE tandem stance for 3-5 seconds  4. Improve neurological disorder survey to less than 40% disabled     Long Term Goals: 8 weeks   1. Increase BLE hip and knee strength by 1 grade throughout  2. Perform unilateral stance BLE to 2 seconds and tandem stance for 10 seconds     Plan   Plan of care Certification: 12/12/2019 to 1/10/2019.     Outpatient Physical Therapy 2 times weekly to include the following interventions: Electrical Stimulation IFC, TENS, Manual Therapy, Moist Heat/ Ice, Neuromuscular Re-ed, Patient Education, Self Care and Therapeutic Exercise.     Marlen Suárez, PT

## 2020-01-03 ENCOUNTER — CLINICAL SUPPORT (OUTPATIENT)
Dept: REHABILITATION | Facility: HOSPITAL | Age: 40
End: 2020-01-03
Payer: MEDICAID

## 2020-01-03 DIAGNOSIS — R26.9 GAIT DISTURBANCE: Primary | ICD-10-CM

## 2020-01-03 PROCEDURE — 97110 THERAPEUTIC EXERCISES: CPT

## 2020-01-03 NOTE — PROGRESS NOTES
Physical Therapy Daily Treatment Note     Name: Larry Hinds  Clinic Number: [id number]    Therapy Diagnosis:   Encounter Diagnosis   Name Primary?    Gait disturbance Yes     Physician: Nelly Fernandez, APRN,*    Visit Date: 1/3/2020     Physician Orders: PT Eval and Treat   Medical Diagnosis from Referral: Multiple Sclerosis  Evaluation Date: 12/12/2019  Authorization Period Expiration: 11/18/2020  Plan of Care Expiration: 1/10/2019  Visit # / Visits authorized: 5/13    Time In: 10:00  Time Out: 11:00  Total Billable Time: 40 minutes    Precautions: Standard and Fall    Subjective     Pt reports: not walking too good today. Reported walking better after PROM  He was compliant with home exercise program.  Response to previous treatment: good  Functional change: none at this time    Pain: 0/10  Location:  n/a     Objective     Larry received therapeutic exercises to develop strength, endurance and flexibility for 55 minutes including:  elliptical x 2' - unable to perform  Manual stretching performed BLE (hamstring, hip ER)  Piriformis stretch  Figure four stretch  1/2 knee lunge (needs assist for balance)- not today  Windshield wiper in supine and prone  Cat-cow stretch  Child's pose stretch  Superman alternating UE/LE- not today  Hamstring curls with 4# (prone)- not today  Plank x 15 seconds- not today  Dead bug- not today  DKTC, bridging and trunk rotation with green stability ball- not today  Bird Dog (needs practice)- not today  SLR + PPT: 2# RLE, 4# LLE- not today  SL clamshell, reverse clam, abduction LLE- not today  Supine ER with RTB and abduction (no band)- not today  Unilateral bridge- not today  Matrix rows 85#  Matrix chest press 70#  Matrix triceps 75#- not today  Matrix biceps 50#- not today  Matrix knee extension 25#  Matrix knee flexion 35#    Larry received the following manual therapy techniques:  were applied to the: - for -- minutes, including:    Larry participated in neuromuscular  re-education activities to improve:  for - minutes. The following activities were included:    Home Exercises Provided and Patient Education Provided     Education provided:   - home program updated    Written Home Exercises Provided: yes.  Exercises were reviewed and Larry was able to demonstrate them prior to the end of the session.  Larry demonstrated good  understanding of the education provided.     See EMR under Patient Instructions for exercises provided 12/23/2019 and prior visit and prior visit    Assessment     Osman was limited on which exercises he could perform today. Part of today's session emphasized UE strengthening, LE stretching and knee strengthening exercises. Hip strengthening exercises were not performed. He was able to walk better after PROM exercises for bilateral hips. He was able to increase resistance for UE exercises  Larry is progressing well towards his goals.   Pt prognosis is Good.     Pt will continue to benefit from skilled outpatient physical therapy to address the deficits listed in the problem list box on initial evaluation, provide pt/family education and to maximize pt's level of independence in the home and community environment.     Pt's spiritual, cultural and educational needs considered and pt agreeable to plan of care and goals.     Anticipated barriers to physical therapy: none    Goals:     Short Term Goals: 4 weeks   1. Increase in BLE hip and knee strength by 1/2 grade  2. Increase in BLE hamstring length and hip ER and flexion by 20 degrees  3. Maintain BLE tandem stance for 3-5 seconds  4. Improve neurological disorder survey to less than 40% disabled     Long Term Goals: 8 weeks   1. Increase BLE hip and knee strength by 1 grade throughout  2. Perform unilateral stance BLE to 2 seconds and tandem stance for 10 seconds     Plan   Plan of care Certification: 12/12/2019 to 1/10/2019.     Outpatient Physical Therapy 2 times weekly to include the following  interventions: Electrical Stimulation IFC, TENS, Manual Therapy, Moist Heat/ Ice, Neuromuscular Re-ed, Patient Education, Self Care and Therapeutic Exercise.     Marlen Suárez, PT

## 2020-01-06 NOTE — TELEPHONE ENCOUNTER
Faxed Rollator order to [facility] [phone] and provided phone number for [employer] to pt via [employer] message.

## 2020-01-07 ENCOUNTER — CLINICAL SUPPORT (OUTPATIENT)
Dept: REHABILITATION | Facility: HOSPITAL | Age: 40
End: 2020-01-07
Payer: MEDICAID

## 2020-01-07 DIAGNOSIS — R26.9 GAIT DISTURBANCE: Primary | ICD-10-CM

## 2020-01-07 PROCEDURE — 97110 THERAPEUTIC EXERCISES: CPT

## 2020-01-07 NOTE — PROGRESS NOTES
Physical Therapy Daily Treatment Note     Name: Larry Hinds  Clinic Number: [id number]    Therapy Diagnosis:   Encounter Diagnosis   Name Primary?    Gait disturbance Yes     Physician: Nelly Fernandez APRN,*    Visit Date: 1/7/2020     Physician Orders: PT Eval and Treat   Medical Diagnosis from Referral: Multiple Sclerosis  Evaluation Date: 12/12/2019  Authorization Period Expiration: 11/18/2020  Plan of Care Expiration: 1/10/2019  Visit # / Visits authorized: 6/13    Time In: 1:00pm  Time Out: 2:15pm  Total Billable Time: 44 minutes    Precautions: Standard and Fall    Subjective     Pt reports: not walking too good today. Reported walking better after PROM  He was compliant with home exercise program.  Response to previous treatment: good  Functional change: none at this time    Pain: 0/10  Location:  n/a     Objective     Larry received therapeutic exercises to develop strength, endurance and flexibility for 65 minutes including:  elliptical x 2' - not today  Manual stretching performed BLE (hamstring, hip ER)  Piriformis stretch  Figure four stretch  1/2 knee lunge (needs assist for balance)- not today  Windshield wiper in supine and prone  Cat-cow stretch  Child's pose stretch  Superman alternating UE/LE- not today  Hamstring curls with 4# (prone)- not today  Plank x 15 seconds- not today  DKTC, bridging and trunk rotation with green stability ball- not today  Bird Dog with and without stability ball  SLR + PPT: 2# RLE, 4# LLE- not today  SL clamshell, reverse clam, abduction LLE-with assist  Supine ER with RTB and abduction (no band)- not today  Bridging with march  Matrix rows 85#  Matrix chest press 70#  Matrix triceps 75#  Matrix biceps 50#  Matrix knee extension 25#  Matrix knee flexion 35#  Matrix Leg press 40#    Larry received the following manual therapy techniques:  were applied to the: - for -- minutes, including: see there ex section    Larry participated in neuromuscular re-education  activities to improve:  for - minutes. The following activities were included:    Home Exercises Provided and Patient Education Provided     Education provided:   - home program instruction    Written Home Exercises Provided: yes.  Exercises were reviewed and Larry was able to demonstrate them prior to the end of the session.  Larry demonstrated good  understanding of the education provided.     See EMR under Patient Instructions for exercises provided 12/23/2019 and prior visit and prior visit    Assessment     Osman was able to perform LE exercises and core exercises with less difficulty today. He was able to perform bird dog with and without a stability ball. Muscle fatigue limited reps/sets. He alternated strengthening UEs and LEs during session. He required rest prior to walking out of the clinic.  Larry is progressing well towards his goals.   Pt prognosis is Good.     Pt will continue to benefit from skilled outpatient physical therapy to address the deficits listed in the problem list box on initial evaluation, provide pt/family education and to maximize pt's level of independence in the home and community environment.     Pt's spiritual, cultural and educational needs considered and pt agreeable to plan of care and goals.     Anticipated barriers to physical therapy: none    Goals:     Short Term Goals: 4 weeks   1. Increase in BLE hip and knee strength by 1/2 grade  2. Increase in BLE hamstring length and hip ER and flexion by 20 degrees  3. Maintain BLE tandem stance for 3-5 seconds  4. Improve neurological disorder survey to less than 40% disabled     Long Term Goals: 8 weeks   1. Increase BLE hip and knee strength by 1 grade throughout  2. Perform unilateral stance BLE to 2 seconds and tandem stance for 10 seconds     Plan   Plan of care Certification: 12/12/2019 to 1/10/2019.     Outpatient Physical Therapy 2 times weekly to include the following interventions: Electrical Stimulation IFC, TENS,  Manual Therapy, Moist Heat/ Ice, Neuromuscular Re-ed, Patient Education, Self Care and Therapeutic Exercise.     Marlen Suárez, PT

## 2020-01-08 ENCOUNTER — PATIENT MESSAGE (OUTPATIENT)
Dept: NEUROLOGY | Facility: CLINIC | Age: 40
End: 2020-01-08

## 2020-02-04 ENCOUNTER — PATIENT MESSAGE (OUTPATIENT)
Dept: NEUROLOGY | Facility: CLINIC | Age: 40
End: 2020-02-04

## 2020-02-17 ENCOUNTER — PATIENT OUTREACH (OUTPATIENT)
Dept: ADMINISTRATIVE | Facility: HOSPITAL | Age: 40
End: 2020-02-17

## 2020-02-18 ENCOUNTER — PATIENT MESSAGE (OUTPATIENT)
Dept: NEUROLOGY | Facility: CLINIC | Age: 40
End: 2020-02-18

## 2020-02-18 ENCOUNTER — TELEPHONE (OUTPATIENT)
Dept: NEUROLOGY | Facility: CLINIC | Age: 40
End: 2020-02-18

## 2020-02-18 NOTE — TELEPHONE ENCOUNTER
Attempted to contact pt to reschedule upcoming appt with BB on February 26, left voicemail requesting call back to reschedule.

## 2020-02-22 ENCOUNTER — PATIENT MESSAGE (OUTPATIENT)
Dept: NEUROLOGY | Facility: CLINIC | Age: 40
End: 2020-02-22

## 2020-02-22 ENCOUNTER — PATIENT MESSAGE (OUTPATIENT)
Dept: INTERNAL MEDICINE | Facility: CLINIC | Age: 40
End: 2020-02-22

## 2020-02-24 DIAGNOSIS — G35 MULTIPLE SCLEROSIS: Primary | ICD-10-CM

## 2020-02-25 ENCOUNTER — OFFICE VISIT (OUTPATIENT)
Dept: INTERNAL MEDICINE | Facility: CLINIC | Age: 40
End: 2020-02-25
Payer: MEDICAID

## 2020-02-25 ENCOUNTER — LAB VISIT (OUTPATIENT)
Dept: LAB | Facility: HOSPITAL | Age: 40
End: 2020-02-25
Attending: FAMILY MEDICINE
Payer: MEDICAID

## 2020-02-25 VITALS — HEART RATE: 75 BPM | DIASTOLIC BLOOD PRESSURE: 80 MMHG | OXYGEN SATURATION: 96 % | SYSTOLIC BLOOD PRESSURE: 118 MMHG

## 2020-02-25 DIAGNOSIS — E78.1 HYPERTRIGLYCERIDEMIA: ICD-10-CM

## 2020-02-25 DIAGNOSIS — G35 MS (MULTIPLE SCLEROSIS): ICD-10-CM

## 2020-02-25 DIAGNOSIS — G35 MS (MULTIPLE SCLEROSIS): Primary | ICD-10-CM

## 2020-02-25 LAB
CHOLEST SERPL-MCNC: 284 MG/DL (ref 120–199)
CHOLEST/HDLC SERPL: 8.4 {RATIO} (ref 2–5)
ESTRADIOL SERPL-MCNC: 23 PG/ML (ref 11–44)
HDLC SERPL-MCNC: 34 MG/DL (ref 40–75)
HDLC SERPL: 12 % (ref 20–50)
LDLC SERPL CALC-MCNC: ABNORMAL MG/DL (ref 63–159)
NONHDLC SERPL-MCNC: 250 MG/DL
TESTOST SERPL-MCNC: 617 NG/DL (ref 304–1227)
TRIGL SERPL-MCNC: 412 MG/DL (ref 30–150)

## 2020-02-25 PROCEDURE — 99999 PR PBB SHADOW E&M-EST. PATIENT-LVL III: ICD-10-PCS | Mod: PBBFAC,,, | Performed by: FAMILY MEDICINE

## 2020-02-25 PROCEDURE — 84402 ASSAY OF FREE TESTOSTERONE: CPT

## 2020-02-25 PROCEDURE — 82670 ASSAY OF TOTAL ESTRADIOL: CPT

## 2020-02-25 PROCEDURE — 99213 PR OFFICE/OUTPT VISIT, EST, LEVL III, 20-29 MIN: ICD-10-PCS | Mod: S$PBB,,, | Performed by: FAMILY MEDICINE

## 2020-02-25 PROCEDURE — 99999 PR PBB SHADOW E&M-EST. PATIENT-LVL III: CPT | Mod: PBBFAC,,, | Performed by: FAMILY MEDICINE

## 2020-02-25 PROCEDURE — 99213 OFFICE O/P EST LOW 20 MIN: CPT | Mod: PBBFAC | Performed by: FAMILY MEDICINE

## 2020-02-25 PROCEDURE — 84403 ASSAY OF TOTAL TESTOSTERONE: CPT

## 2020-02-25 PROCEDURE — 80061 LIPID PANEL: CPT

## 2020-02-25 PROCEDURE — 99213 OFFICE O/P EST LOW 20 MIN: CPT | Mod: S$PBB,,, | Performed by: FAMILY MEDICINE

## 2020-02-25 NOTE — PROGRESS NOTES
"Subjective:       Patient ID: Larry Hinds is a 39 y.o. male.    Chief Complaint: No chief complaint on file.    HPI     40 yo M    PMH:  MS  Transvere myelitis - 09    F.H.  Cancer  - breast, leukemia ( uncertain ), esophageal - asbestosis  Father - Stroke in Sept / CAD -   Mother - Breast Cancer        No alcohol  Never smoker  No drugs       Going to [place]  For "exosomes " / stem cell tx  Views this as "last resort" and he is desperate for therapy    Weakness significant  Hates using his walker.  Walked into clinic today - but stumbles and relies on walls and hand rails.    Reports weakness with feeding himself.    He is living on his own and reports doing ok with that.      Review of Systems   Constitutional: Negative for activity change and unexpected weight change.   HENT: Negative for hearing loss, rhinorrhea and trouble swallowing.    Eyes: Negative for discharge and visual disturbance.   Respiratory: Negative for chest tightness and wheezing.    Cardiovascular: Negative for chest pain and palpitations.   Gastrointestinal: Negative for blood in stool, constipation, diarrhea and vomiting.   Endocrine: Negative for polydipsia and polyuria.   Genitourinary: Positive for difficulty urinating. Negative for hematuria and urgency.   Musculoskeletal: Positive for arthralgias and neck pain. Negative for joint swelling.   Neurological: Positive for weakness. Negative for headaches.   Psychiatric/Behavioral: Negative for confusion and dysphoric mood.       Objective:       Vitals:    02/25/20 0912   BP: 118/80   Pulse: 75       Physical Exam   Constitutional: He is oriented to person, place, and time. He appears well-developed. No distress.   HENT:   Head: Normocephalic.   Cardiovascular: Normal rate, regular rhythm and normal heart sounds.   Pulmonary/Chest: Effort normal and breath sounds normal.   Neurological: He is alert and oriented to person, place, and time. Coordination abnormal.   Skin: Skin is warm and " "dry.   Psychiatric: He has a normal mood and affect. His behavior is normal.       Assessment:       1. MS (multiple sclerosis)    2. Hypertriglyceridemia        Plan:     MS  Has pending tx planned in [place]  Uncertain of risk/ benefits of such treatment and I discussed that with him. He is determined and views this as " last resort" and is desperate to return to function  Request Testosterone free,total, estradiol, CBC, CMP  Will order   exosome treament -   Will message his neurologist - Dr. Bella       Hypertriglyceridemia  Will check again today  "

## 2020-02-27 ENCOUNTER — PATIENT MESSAGE (OUTPATIENT)
Dept: NEUROLOGY | Facility: CLINIC | Age: 40
End: 2020-02-27

## 2020-02-27 ENCOUNTER — DOCUMENTATION ONLY (OUTPATIENT)
Dept: NEUROLOGY | Facility: CLINIC | Age: 40
End: 2020-02-27

## 2020-02-27 ENCOUNTER — TELEPHONE (OUTPATIENT)
Dept: NEUROLOGY | Facility: CLINIC | Age: 40
End: 2020-02-27

## 2020-02-28 LAB — TESTOST FREE SERPL-MCNC: 4.4 PG/ML (ref 5.1–41.5)

## 2020-03-03 ENCOUNTER — TELEPHONE (OUTPATIENT)
Dept: INTERNAL MEDICINE | Facility: CLINIC | Age: 40
End: 2020-03-03

## 2020-03-03 NOTE — TELEPHONE ENCOUNTER
Left a message for the patient to call the clinic about his lab results and need to redo labs. /sl/

## 2020-03-03 NOTE — TELEPHONE ENCOUNTER
----- Message from Ciaran Sahni MD sent at 3/3/2020  4:41 PM CST -----  Please call the patient regarding his labs  Free testosterone slightly low, but total is normal - would not do testosterone replacement therapy   Estradiol within normal limits  Cholesterol is elevated - will need to improve his diet.  Provide w/ print out if needed.    I seem to remember CMP was needed - was not ordered as assumed was going to get done with the already ordered lab work by another physician - but I don't see it was ordered - inquire into such. If needs a stat CMP let me know.

## 2020-03-11 ENCOUNTER — PATIENT MESSAGE (OUTPATIENT)
Dept: INTERNAL MEDICINE | Facility: CLINIC | Age: 40
End: 2020-03-11

## 2020-03-11 ENCOUNTER — TELEPHONE (OUTPATIENT)
Dept: NEUROLOGY | Facility: CLINIC | Age: 40
End: 2020-03-11

## 2020-03-12 ENCOUNTER — CLINICAL SUPPORT (OUTPATIENT)
Dept: REHABILITATION | Facility: HOSPITAL | Age: 40
End: 2020-03-12
Payer: MEDICAID

## 2020-03-12 DIAGNOSIS — R26.9 GAIT DISTURBANCE: Primary | ICD-10-CM

## 2020-03-12 DIAGNOSIS — G35 MULTIPLE SCLEROSIS: ICD-10-CM

## 2020-03-12 PROCEDURE — 97110 THERAPEUTIC EXERCISES: CPT

## 2020-03-12 PROCEDURE — 97161 PT EVAL LOW COMPLEX 20 MIN: CPT

## 2020-03-12 NOTE — PLAN OF CARE
[facility]  Physical Therapy Initial Evaluation     Name: Larry Hinds  Clinic Number: [id number]     Therapy Diagnosis:        Encounter Diagnosis   Name Primary?    Gait disturbance Yes      Physician: Nelly Fernandez APRN,*     Physician Orders: PT Eval and Treat   Medical Diagnosis from Referral: Multiple Sclerosis  Evaluation Date: 3/12/2020  Authorization Period Expiration: 3/14/2020  Plan of Care Expiration: 4/10/2020  Visit # / Visits authorized: 1/ 1     Time In: 1:00 pm  Time Out: 2:00pm  Total Billable Time:  30 minutes     Precautions: Standard     Subjective   Date of onset: 6 years ago dx with MS  History of current condition - Larry reports: currently in a clinical trial: Stimcell and Exosomes therapy injections every 3 weeks for 4 treatments. His MS has progressed since last visit and he can barely walk. He is unable to stretch legs or move muscles without difficulty. His neurologist ordered therapy to provided him with a modified program to maintain/improve mm strength and flexibility      Medical History:        Past Medical History:   Diagnosis Date    Multiple sclerosis      Transverse myelitis           Surgical History:   Larry Hinds  has no past surgical history on file.     Medications:   Larry has a current medication list which includes the following prescription(s): ampyra, ascorbic acid (vitamin c), b complex vitamins, biotin, cholecalciferol (vitamin d3), coenzyme q10, cyanocobalamin, multivitamin, olive leaf extract, omega-3 acid ethyl esters, turmeric, UNABLE TO FIND, UNABLE TO FIND, UNABLE TO FIND, UNABLE TO FIND, and UNABLE TO FIND.     Allergies:        Review of patient's allergies indicates:   Allergen Reactions    Cat's claw (uncaria tomentosa)      Cat/feline products           Prior Therapy: none  Social History:  lives alone  Occupation: disabled  Prior Level of Function: difficulty walking  Current Level of Function: more difficulty  walking as compared to July 2019     Pain:  Current 5/10, worst 6/10, best 1/10   Location: all over   Description: Aching, burning  Aggravating Factors: no particular activity  Easing Factors: stretching     Pts goals: get stronger, walk independently, improve balance, get back to work if possible, go to a yoga class     Objective      RLE strength: hip flexion 1+/5, hip abduction 1/5, hip adduction 1+/5, hip extension 2-/5, knee extension 1+/5, knee flexion 3-/5, ankle 3/5  RLE ROM: hamstring length 50 degrees, Hip ER limited 50%, Hip flexion limited 25%     LLE strength: hip flexion 1+/5, hip abduction 1/5, hip adduction 2/5, hip extension 3-/5, knee extension 3+/5, knee flexion 3-/5, ankle 3/5  LLE ROM: hamstring length 55 degrees, Hip ER limited 50%, Hip flexion limited 25%     Unilateral stance: unable BLE     Tandem stance: 0 sec BLE     Plank on elbow time: 0 seconds    Plank on hands time: 0 seconds     Gait pattern: impaired BLE step length, stance time, heel strike, hip flexion; scissor gait at times; needs human assist (unable to use a walker, does not have a wheelchair)        CMS Impairment/Limitation/Restriction for FOTO muscular sclerosis Survey     Therapist reviewed FOTO scores for Larry Hinds on 3/12/2020  FOTO documents entered into [employer] - see Media section.     Limitation Score: 71%  Category: Mobility     Current : CL = at least 60% but < 80% impaired, limited or restricted  Goal: CK = at least 40% but < 60% impaired, limited or restricted  Discharge: n/a            TREATMENT   Treatment Time In: 1:30 pm  Treatment Time Out: 2:00 pm  Total Treatment time separate from Evaluation: 30 minutes     Larry received therapeutic exercises to develop flexibility for 30 minutes including:  Piriformis stretch on edge of mat  Figure four stretch in sitting  Windshield wiper in supine and prone  Child's pose stretch  T/s rotation stretch  Seated hamstring stretch    Home Exercises and Patient Education  Provided     Education provided:   - home program modified     Written Home Exercises Provided: yes.  Exercises were reviewed and Larry was able to demonstrate them prior to the end of the session.  Larry demonstrated good  understanding of the education provided.      See EMR under Patient Instructions for exercises provided 3/12/2020     Assessment   Larry is a 39 y.o. male referred to outpatient Physical Therapy with a medical diagnosis of MS. Pt presents with impaired balance, gait pattern, impaired strength, impaired core strength, impaired BLE ROM, general aching all over, hx of falls. He is currently participating in a clinical trial which he hopes will reverse effects of his MS. He does not have progressive type of MS, however, his MS appears to be progressing rapidly since the last evaluation. He did not sleep well last night, which he contributes to his worsening condition. He needs to be able to strengthen and stretch his muscles as much as possible during the clinical trial. He gets treatments every 3 weeks. Next treatment is in 2 weeks.     Pt prognosis is Fair.  Pt will benefit from skilled outpatient Physical Therapy to address the deficits stated above and in the chart below, provide pt/family education, and to maximize pt's level of independence.      Plan of care discussed with patient: Yes  Pt's spiritual, cultural and educational needs considered and patient is agreeable to the plan of care and goals as stated below:      Anticipated Barriers for therapy: none     Medical Necessity is demonstrated by the following  History  Co-morbidities and personal factors that may impact the plan of care Co-morbidities:   MS     Personal Factors:   no deficits       low   Examination  Body Structures and Functions, activity limitations and participation restrictions that may impact the plan of care Body Regions:   lower extremities  upper extremities  trunk     Body Systems:     ROM  strength  balance  gait     Participation Restrictions:   none     Activity limitations:   Learning and applying knowledge  no deficits     General Tasks and Commands  no deficits     Communication  no deficits     Mobility  lifting and carrying objects  fine hand use (grasping/picking up)  walking     Self care  no deficits     Domestic Life  shopping  cooking  doing house work (cleaning house, washing dishes, laundry)  assisting others     Interactions/Relationships  no deficits     Life Areas  no deficits     Community and Social Life  Recreational/leisure             low   Clinical Presentation stable and evolving low   Decision Making/ Complexity Score: low      Goals:  Short Term Goals: 4 weeks   1. Increase in BLE hip and knee strength by 1/2 grade  2. Increase in BLE hamstring length and hip ER and flexion by 20 degrees  3. Maintain BLE tandem stance for 3-5 seconds  4. Improve MS survey to less than 60% disabled  5. Independent with [facility] for general strengthening and flexibilty     Long Term Goals: 8 weeks   1. Increase BLE hip and knee strength by 1 grade throughout  2. Perform unilateral stance BLE to 2 seconds and tandem stance for 10 seconds  3. Improve MS survey to less than 40% disabled     Plan   Plan of care Certification:  3/12/2020 to 4/10/2020     Outpatient Physical Therapy 2 times weekly for 8 weeks to include the following interventions: Electrical Stimulation IFC, TENS, Manual Therapy, Moist Heat/ Ice, Neuromuscular Re-ed, Patient Education, Self Care and Therapeutic Exercise.      Marlen Suárez, PT

## 2020-03-19 ENCOUNTER — CLINICAL SUPPORT (OUTPATIENT)
Dept: REHABILITATION | Facility: HOSPITAL | Age: 40
End: 2020-03-19
Payer: MEDICAID

## 2020-03-19 DIAGNOSIS — R26.9 GAIT DISTURBANCE: Primary | ICD-10-CM

## 2020-03-19 PROCEDURE — 97110 THERAPEUTIC EXERCISES: CPT

## 2020-03-19 NOTE — PROGRESS NOTES
Physical Therapy Daily Treatment Note     Name: Larry Hinds  Clinic Number: [id number]    Therapy Diagnosis:   Encounter Diagnosis   Name Primary?    Gait disturbance Yes     Physician: Ciaran Sahni MD    Visit Date: 3/19/2020    Physician Orders: PT Eval and Treat   Medical Diagnosis from Referral: Multiple Sclerosis  Evaluation Date: 3/12/2020  Authorization Period Expiration: 5/19/2020  Plan of Care Expiration: 4/10/2020  Visit # / Visits authorized: 1/ 16    Time In: 1:00 pm  Time Out: 2:00 pm  Total Billable Time: 60 minutes    Precautions: Standard    Subjective     Pt reports: feeling stronger today. Reports LE tightness  He was compliant with home exercise program.  Response to previous treatment: good  Functional change: walking better    Pain: 0/10  Location:  n/a     Objective     Larry received therapeutic exercises to develop strength, ROM, flexibility and core stabilization for 60 minutes including:  BLE stretching (hips, hamstrings)  Core exercises with stability ball  Glute exercises with stability ball  UE weight maching exercises: rows, chest press, biceps and triceps    Home Exercises Provided and Patient Education Provided     Education provided:   - home program instruction    Written Home Exercises Provided: Patient instructed to cont prior HEP.  Exercises were reviewed and Larry was able to demonstrate them prior to the end of the session.  Larry demonstrated good  understanding of the education provided.     See EMR under n/a for exercises provided prior visit.    Assessment     Larry responded well to exercises and wasn't too fatigued when leaving the clinic. He alternated between UE. LE and core exercises to decrease risk of mm fatigue.  Larry is progressing well towards his goals.   Pt prognosis is Good.     Pt will continue to benefit from skilled outpatient physical therapy to address the deficits listed in the problem list box on initial evaluation, provide pt/family  education and to maximize pt's level of independence in the home and community environment.     Pt's spiritual, cultural and educational needs considered and pt agreeable to plan of care and goals.     Anticipated barriers to physical therapy: none    Goals:   Short Term Goals: 4 weeks   1. Increase in BLE hip and knee strength by 1/2 grade  2. Increase in BLE hamstring length and hip ER and flexion by 20 degrees  3. Maintain BLE tandem stance for 3-5 seconds  4. Improve MS survey to less than 60% disabled  5. Independent with HEP for general strengthening and flexibilty     Long Term Goals: 8 weeks   1. Increase BLE hip and knee strength by 1 grade throughout  2. Perform unilateral stance BLE to 2 seconds and tandem stance for 10 seconds  3. Improve MS survey to less than 40% disabled     Plan   Plan of care Certification:  3/12/2020 to 4/10/2020     Outpatient Physical Therapy 2 times weekly to include the following interventions: Electrical Stimulation IFC, TENS, Manual Therapy, Moist Heat/ Ice, Neuromuscular Re-ed, Patient Education, Self Care and Therapeutic Exercise.       Marlen Suárez, PT

## 2020-03-25 ENCOUNTER — PATIENT MESSAGE (OUTPATIENT)
Dept: INTERNAL MEDICINE | Facility: CLINIC | Age: 40
End: 2020-03-25

## 2020-03-25 DIAGNOSIS — I05.9 MITRAL VALVE DISORDER: Primary | ICD-10-CM

## 2020-03-26 NOTE — TELEPHONE ENCOUNTER
amb ref Cards sent    Please message cardiology  Not sure how soon he can get appt, for EKG, ECHO  He is medicaid, so need override

## 2020-03-30 ENCOUNTER — PATIENT MESSAGE (OUTPATIENT)
Dept: REHABILITATION | Facility: HOSPITAL | Age: 40
End: 2020-03-30

## 2020-04-15 ENCOUNTER — PATIENT MESSAGE (OUTPATIENT)
Dept: NEUROLOGY | Facility: CLINIC | Age: 40
End: 2020-04-15

## 2020-04-15 ENCOUNTER — PATIENT MESSAGE (OUTPATIENT)
Dept: INTERNAL MEDICINE | Facility: CLINIC | Age: 40
End: 2020-04-15

## 2020-04-16 ENCOUNTER — OFFICE VISIT (OUTPATIENT)
Dept: INTERNAL MEDICINE | Facility: CLINIC | Age: 40
End: 2020-04-16
Payer: MEDICAID

## 2020-04-16 DIAGNOSIS — G35 MS (MULTIPLE SCLEROSIS): Primary | ICD-10-CM

## 2020-04-16 PROCEDURE — 99214 PR OFFICE/OUTPT VISIT, EST, LEVL IV, 30-39 MIN: ICD-10-PCS | Mod: 95,,, | Performed by: NURSE PRACTITIONER

## 2020-04-16 PROCEDURE — 99214 OFFICE O/P EST MOD 30 MIN: CPT | Mod: 95,,, | Performed by: NURSE PRACTITIONER

## 2020-04-16 NOTE — PROGRESS NOTES
Larry Hinds 39 y.o. male     History of Present Illness:  The patient location is: [place],    The chief complaint leading to consultation is: MS   Visit type: Virtual visit with synchronous audio and video  Total time spent with patient: 20 minutes   Each patient to whom he or she provides medical services by telemedicine is:  (1) informed of the relationship between the physician and patient and the respective role of any other health care provider with respect to management of the patient; and (2) notified that he or she may decline to receive medical services by telemedicine and may withdraw from such care at any time.    Pt is new to provider, but established in practice and c/o:      Request for hyperbaric referral   Has heard success stories of MS pts undergoing therapy   Feeling desperate for treatment, has lost ability to walk (only 40yo)     Recently underwent exosome therapy in [place] without results     Also wants to use more natural tx approaches vs. prescription meds   Has tried multiple diet approaches:   - Water fasting   - dry fasting   - Intermittent fasting   - keto     Exam:  Review of Systems   Respiratory: Negative for cough and shortness of breath.    Gastrointestinal: Negative for abdominal distention and abdominal pain.   Musculoskeletal: Positive for gait problem.   Neurological: Positive for weakness. Negative for speech difficulty.   Psychiatric/Behavioral: Positive for dysphoric mood.     Physical Exam   Constitutional: He is oriented to person, place, and time. He appears well-developed and well-nourished.  Non-toxic appearance. He does not have a sickly appearance. He does not appear ill. No distress.   HENT:   Head: Normocephalic and atraumatic.   Right Ear: External ear normal.   Left Ear: External ear normal.   Eyes: Pupils are equal, round, and reactive to light. Conjunctivae, EOM and lids are normal. Right eye exhibits no discharge. Left eye exhibits no discharge. No scleral  "icterus.   Neck: Normal range of motion. No tracheal deviation present.   Pulmonary/Chest: Effort normal. No respiratory distress.   Neurological: He is alert and oriented to person, place, and time.   Skin: He is not diaphoretic.   Psychiatric: He has a normal mood and affect. His speech is normal and behavior is normal. Judgment and thought content normal. Cognition and memory are normal.       Most Recent Laboratory Results Reviewed ({Yes)  Lab Results   Component Value Date    WBC 6.74 08/30/2019    HGB 16.0 08/30/2019    HCT 48.5 08/30/2019     08/30/2019    CHOL 284 (H) 02/25/2020    TRIG 412 (H) 02/25/2020    HDL 34 (L) 02/25/2020    ALT 16 02/12/2019    AST 13 02/12/2019     02/12/2019    K 4.2 02/12/2019     02/12/2019    CREATININE 1.2 09/10/2019    BUN 17 02/12/2019    CO2 28 02/12/2019       Assessment     ICD-10-CM ICD-9-CM   1. MS (multiple sclerosis) G35 340        Plan   MS (multiple sclerosis)  -     Ambulatory referral/consult to Wound Clinic; Future; Expected date: 04/23/2020 (external to [facility] per pt request)   - discussed that insurance is likely not to cover hyperbarics   - followed by neuro, but has had minimal results from past tx approaches and wants to take a more integrative approach    - suggested "It Starts with Food" by Gaby Flynn      Follow up if symptoms worsen or fail to improve.  Future Appointments     Date Provider Specialty Appt Notes    4/27/2020 Koki Bella MD Neurology 2mo F/U    5/18/2020 Kelby Savage MD Cardiology Specialty Services Required. [facility] to offer virtual visit.    5/25/2020 Ciaran Sahni MD Internal Medicine follow up        "

## 2020-04-21 ENCOUNTER — PATIENT MESSAGE (OUTPATIENT)
Dept: INTERNAL MEDICINE | Facility: CLINIC | Age: 40
End: 2020-04-21

## 2020-04-21 DIAGNOSIS — G35 MS (MULTIPLE SCLEROSIS): Primary | ICD-10-CM

## 2020-05-18 ENCOUNTER — OFFICE VISIT (OUTPATIENT)
Dept: CARDIOLOGY | Facility: CLINIC | Age: 40
End: 2020-05-18
Payer: MEDICAID

## 2020-05-18 DIAGNOSIS — M79.89 LEG SWELLING: ICD-10-CM

## 2020-05-18 DIAGNOSIS — G35 MULTIPLE SCLEROSIS: ICD-10-CM

## 2020-05-18 DIAGNOSIS — I05.9 MITRAL VALVE DISORDER: Primary | ICD-10-CM

## 2020-05-18 PROCEDURE — 99204 OFFICE O/P NEW MOD 45 MIN: CPT | Mod: 95,,, | Performed by: INTERNAL MEDICINE

## 2020-05-18 PROCEDURE — 99204 PR OFFICE/OUTPT VISIT, NEW, LEVL IV, 45-59 MIN: ICD-10-PCS | Mod: 95,,, | Performed by: INTERNAL MEDICINE

## 2020-05-18 NOTE — PROGRESS NOTES
Subjective:   Patient ID:  Larry Hinds is a 39 y.o. male who presents for evaluation of No chief complaint on file.      40 yo male care establish  [facility] remote h/o MVP at teenage, h/o multiple sclerosis for 6 years. Body burning, head numbness, wheelchair bound can stand. Arm weak. No swallowing issue, /ho transver myelolityir  No CAMPBELL, orthopnea, PND, chest pain  Feet occasional swelling  No smoking/drinking  BP wnl        Past Medical History:   Diagnosis Date    Multiple sclerosis     Transverse myelitis        History reviewed. No pertinent surgical history.    Social History     Tobacco Use    Smoking status: Never Smoker    Smokeless tobacco: Never Used   Substance Use Topics    Alcohol use: No     Frequency: 2-4 times a month     Drinks per session: 3 or 4     Binge frequency: Never    Drug use: No       Family History   Problem Relation Age of Onset    Cancer Mother     Stroke Father     Cancer Father     Glaucoma Father     Cancer Maternal Grandmother     Cancer Maternal Grandfather        ROS    Objective:   Physical Exam    Lab Results   Component Value Date    CHOL 284 (H) 02/25/2020    CHOL 302 (H) 08/30/2019     Lab Results   Component Value Date    HDL 34 (L) 02/25/2020    HDL 34 (L) 08/30/2019     Lab Results   Component Value Date    LDLCALC Invalid, Trig>400.0 02/25/2020    LDLCALC Invalid, Trig>400.0 08/30/2019     Lab Results   Component Value Date    TRIG 412 (H) 02/25/2020    TRIG 433 (H) 08/30/2019     Lab Results   Component Value Date    CHOLHDL 12.0 (L) 02/25/2020    CHOLHDL 11.3 (L) 08/30/2019       Chemistry        Component Value Date/Time     02/12/2019 1335    K 4.2 02/12/2019 1335     02/12/2019 1335    CO2 28 02/12/2019 1335    BUN 17 02/12/2019 1335    CREATININE 1.2 09/10/2019 1021    GLU 97 02/12/2019 1335        Component Value Date/Time    CALCIUM 10.2 02/12/2019 1335    ALKPHOS 58 02/12/2019 1335    AST 13 02/12/2019 1335    ALT 16 02/12/2019 1335     BILITOT 0.6 02/12/2019 1335    ESTGFRAFRICA >60 09/10/2019 1021    EGFRNONAA >60 09/10/2019 1021          No results found for: LABA1C, HGBA1C  No results found for: TSH  No results found for: INR, PROTIME  Lab Results   Component Value Date    WBC 6.74 08/30/2019    HGB 16.0 08/30/2019    HCT 48.5 08/30/2019    MCV 92 08/30/2019     08/30/2019     BNP  @LABRCNTIP(BNP,BNPTRIAGEBLO)@  CrCl cannot be calculated (Patient's most recent lab result is older than the maximum 7 days allowed.).  No results found in the last 24 hours.  No results found in the last 24 hours.  No results found in the last 24 hours.    Assessment:      1. Mitral valve disorder    2. Leg swelling    3. Multiple sclerosis        Plan:   echo for h/o MVP  LE venous US for leg swelling  Continue supportive care  RTC as needed      The patient location is: HOME due to COVID 19 pandemic  The chief complaint leading to consultation is: MVP    Visit type: audiovisual    Face to Face time with patient: 16 min of total time spent on the encounter, which includes face to face time and non-face to face time preparing to see the patient (eg, review of tests), Obtaining and/or reviewing separately obtained history, Documenting clinical information in the electronic or other health record, Independently interpreting results (not separately reported) and communicating results to the patient/family/caregiver, or Care coordination (not separately reported).         Each patient to whom he or she provides medical services by telemedicine is:  (1) informed of the relationship between the physician and patient and the respective role of any other health care provider with respect to management of the patient; and (2) notified that he or she may decline to receive medical services by telemedicine and may withdraw from such care at any time.

## 2020-05-18 NOTE — LETTER
May 18, 2020      Guillermina Baldwin MD  [address] [place] [id number]           [facility]  [address] [place] [address]  Phone: [phone]  Fax: [phone]          Patient: Larry Hinds   MR Number: [id number]   YOB: 1980   Date of Visit: 5/18/2020       Dear Dr. Guillermina Baldwin:    Thank you for referring Larry Hinds to me for evaluation. Attached you will find relevant portions of my assessment and plan of care.    If you have questions, please do not hesitate to call me. I look forward to following Larry Hinds along with you.    Sincerely,    Kelby Savage MD    Enclosure  CC:  No Recipients    If you would like to receive this communication electronically, please contact [email or web address] or [phone] to request more information on [employer] Link access.    For providers and/or their staff who would like to refer a patient to [facility], please contact us through our one-stop-shop provider referral line, [facility], at [phone].    If you feel you have received this communication in error or would no longer like to receive these types of communications, please e-mail [email or web address]

## 2020-05-28 ENCOUNTER — DOCUMENTATION ONLY (OUTPATIENT)
Dept: REHABILITATION | Facility: HOSPITAL | Age: 40
End: 2020-05-28

## 2020-05-28 NOTE — PROGRESS NOTES
Outpatient Therapy Discharge Summary     Name: Larry Hinds  [facility] Number: [id number]    Therapy Diagnosis:        Encounter Diagnosis   Name Primary?    Gait disturbance Yes      Physician: Ciaran Sahni MD     Physician Orders: PT Eval and Treat   Medical Diagnosis from Referral: Multiple Sclerosis  Evaluation Date: 3/12/2020      Date of Last visit: 3/19/2020  Total Visits Received: 2  Cancelled Visits: 0  No Show Visits: 0    Assessment    Goals: unable to assess due to Covid-19 pandemic. He is unable to return to therapy at this time    Discharge reason: Patient has not attended therapy since 3/19/2020    Plan   This patient is discharged from Physical Therapy

## 2020-05-29 ENCOUNTER — PATIENT MESSAGE (OUTPATIENT)
Dept: CARDIOLOGY | Facility: CLINIC | Age: 40
End: 2020-05-29

## 2020-05-29 ENCOUNTER — PATIENT MESSAGE (OUTPATIENT)
Dept: NEUROLOGY | Facility: CLINIC | Age: 40
End: 2020-05-29

## 2020-05-29 DIAGNOSIS — Z74.09 IMPAIRED MOBILITY: ICD-10-CM

## 2020-05-29 DIAGNOSIS — G35 MULTIPLE SCLEROSIS: Primary | ICD-10-CM

## 2020-06-08 ENCOUNTER — HOSPITAL ENCOUNTER (OUTPATIENT)
Dept: CARDIOLOGY | Facility: HOSPITAL | Age: 40
Discharge: HOME | End: 2020-06-08
Attending: INTERNAL MEDICINE
Payer: MEDICAID

## 2020-06-08 VITALS
BODY MASS INDEX: 23.19 KG/M2 | BODY MASS INDEX: 23.19 KG/M2 | WEIGHT: 175 LBS | HEART RATE: 60 BPM | WEIGHT: 175 LBS | HEIGHT: 73 IN | HEIGHT: 73 IN

## 2020-06-08 DIAGNOSIS — I05.9 MITRAL VALVE DISORDER: ICD-10-CM

## 2020-06-08 DIAGNOSIS — M79.89 LEG SWELLING: ICD-10-CM

## 2020-06-08 LAB
AORTIC ROOT ANNULUS: 2.86 CM
ASCENDING AORTA: 2.89 CM
AV INDEX (PROSTH): 0.66
AV MEAN GRADIENT: 2 MMHG
AV PEAK GRADIENT: 4 MMHG
AV VALVE AREA: 2.41 CM2
AV VELOCITY RATIO: 0.8
BSA FOR ECHO PROCEDURE: 2.02 M2
CV ECHO LV RWT: 0.52 CM
DOP CALC AO PEAK VEL: 1 M/S
DOP CALC AO VTI: 22.82 CM
DOP CALC LVOT AREA: 3.6 CM2
DOP CALC LVOT DIAMETER: 2.15 CM
DOP CALC LVOT PEAK VEL: 0.8 M/S
DOP CALC LVOT STROKE VOLUME: 55.05 CM3
DOP CALCLVOT PEAK VEL VTI: 15.17 CM
E WAVE DECELERATION TIME: 213.66 MSEC
E/A RATIO: 1.29
E/E' RATIO: 6.82 M/S
ECHO LV POSTERIOR WALL: 1.07 CM (ref 0.6–1.1)
FRACTIONAL SHORTENING: 38 % (ref 28–44)
INTERVENTRICULAR SEPTUM: 0.98 CM (ref 0.6–1.1)
IVRT: 85.63 MSEC
LA MAJOR: 3.44 CM
LA MINOR: 3.79 CM
LA WIDTH: 3.09 CM
LEFT ATRIUM SIZE: 2.47 CM
LEFT ATRIUM VOLUME INDEX: 11.5 ML/M2
LEFT ATRIUM VOLUME: 23.4 CM3
LEFT INTERNAL DIMENSION IN SYSTOLE: 2.54 CM (ref 2.1–4)
LEFT VENTRICLE DIASTOLIC VOLUME INDEX: 36.92 ML/M2
LEFT VENTRICLE DIASTOLIC VOLUME: 75.06 ML
LEFT VENTRICLE MASS INDEX: 68 G/M2
LEFT VENTRICLE SYSTOLIC VOLUME INDEX: 11.4 ML/M2
LEFT VENTRICLE SYSTOLIC VOLUME: 23.12 ML
LEFT VENTRICULAR INTERNAL DIMENSION IN DIASTOLE: 4.12 CM (ref 3.5–6)
LEFT VENTRICULAR MASS: 137.84 G
LV LATERAL E/E' RATIO: 6.44 M/S
LV SEPTAL E/E' RATIO: 7.25 M/S
MV PEAK A VEL: 0.45 M/S
MV PEAK E VEL: 0.58 M/S
PISA TR MAX VEL: 2.33 M/S
PULM VEIN S/D RATIO: 0.98
PV PEAK D VEL: 0.47 M/S
PV PEAK S VEL: 0.46 M/S
PV PEAK VELOCITY: 0.82 CM/S
RA MAJOR: 3.84 CM
RA PRESSURE: 3 MMHG
RA WIDTH: 3.57 CM
RIGHT VENTRICULAR END-DIASTOLIC DIMENSION: 3.29 CM
SINUS: 3.22 CM
STJ: 2.57 CM
TDI LATERAL: 0.09 M/S
TDI SEPTAL: 0.08 M/S
TDI: 0.09 M/S
TR MAX PG: 22 MMHG
TV REST PULMONARY ARTERY PRESSURE: 25 MMHG

## 2020-06-08 PROCEDURE — 93970 EXTREMITY STUDY: CPT | Mod: 50

## 2020-06-08 PROCEDURE — 93306 ECHO (CUPID ONLY): ICD-10-PCS | Mod: 26,,, | Performed by: INTERNAL MEDICINE

## 2020-06-08 PROCEDURE — 93970 CV US DOPPLER VENOUS LEGS BILATERAL (CUPID ONLY): ICD-10-PCS | Mod: 26,,, | Performed by: INTERNAL MEDICINE

## 2020-06-08 PROCEDURE — 93306 TTE W/DOPPLER COMPLETE: CPT | Mod: 26,,, | Performed by: INTERNAL MEDICINE

## 2020-06-08 PROCEDURE — 93306 TTE W/DOPPLER COMPLETE: CPT

## 2020-06-08 PROCEDURE — 93970 EXTREMITY STUDY: CPT | Mod: 26,,, | Performed by: INTERNAL MEDICINE

## 2020-06-12 ENCOUNTER — OFFICE VISIT (OUTPATIENT)
Dept: NEUROLOGY | Facility: CLINIC | Age: 40
End: 2020-06-12
Payer: MEDICAID

## 2020-06-12 ENCOUNTER — TELEPHONE (OUTPATIENT)
Dept: NEUROLOGY | Facility: CLINIC | Age: 40
End: 2020-06-12

## 2020-06-12 DIAGNOSIS — G35 MULTIPLE SCLEROSIS: Primary | ICD-10-CM

## 2020-06-12 PROCEDURE — 99214 OFFICE O/P EST MOD 30 MIN: CPT | Mod: 95,,, | Performed by: CLINICAL NURSE SPECIALIST

## 2020-06-12 PROCEDURE — 99214 PR OFFICE/OUTPT VISIT, EST, LEVL IV, 30-39 MIN: ICD-10-PCS | Mod: 95,,, | Performed by: CLINICAL NURSE SPECIALIST

## 2020-06-12 NOTE — Clinical Note
"Suri, I had a VV with Martin. He expressed some thoughts that were concerning. He does have suicidal thoughts, but says he would never do it. He said he wishes he would get hit by a truck and just end it. He states "I'm glad i'm middle aged now. Half way there. I'm just ready for this to be over." Are you open to counseling with him? Also, he wishes that he could work to some extent. I'm not sure if exploring options with LRS would be worth it, but something that gives him purpose would be so helpful for him. We can talk more next week. "

## 2020-06-12 NOTE — Clinical Note
Could you maybe do a phone visit with him to talk about clinical trial options and/or [employer] with me sitting in? Not sure he's very interested in DMT, but it sounds like he is getting worse and worse. How could we get him in a stem cell trial?? I'm very worried about this subjective report of his level of disability. I did order MRIs to be done soon.

## 2020-06-12 NOTE — Clinical Note
Joellen, he needs MRIs and Vitamin D lab soon in [place]. Schedule f/u with BB in 3 months. Also, we need to schedule mobility evaluation with Dr. Jimenez.Thanks.

## 2020-06-12 NOTE — PROGRESS NOTES
Subjective:          Patient ID: Larry Hinds is a 39 y.o. male who presents today for a routine clinic visit for MS.  He was last seen in December 2019. The history has been provided by the patient. Today's visit is a virtual visit.     The patient location is: his home   The chief complaint leading to consultation is: Multiple Sclerosis     Visit type: audiovisual    Face to Face time with patient: 31 minutes   35 minutes of total time spent on the encounter, which includes face to face time and non-face to face time preparing to see the patient (eg, review of tests), Obtaining and/or reviewing separately obtained history, Documenting clinical information in the electronic or other health record, Independently interpreting results (not separately reported) and communicating results to the patient/family/caregiver, or Care coordination (not separately reported). 4 minutes were spent on chart review and documentation    Each patient to whom he or she provides medical services by telemedicine is:  (1) informed of the relationship between the physician and patient and the respective role of any other health care provider with respect to management of the patient; and (2) notified that he or she may decline to receive medical services by telemedicine and may withdraw from such care at any time.    MS HPI:  · DMT: None  · Taking vitamin D3 as recommended? Yes -  Dose: 10,000 units daily   · He opted not to proceed with Ocrevus after last visit. He did not feel like it was helpful.   He did just recently have exosome treatments and has another one scheduled in July.   He feels like he has declined a lot in the past 6 months. He walks holding on to walls or holding on to someone. He has a rollator at home, but he does not feel stable walking with it.   He is interested in a scooter. Referral has already been placed for Dr. Jimenez for mobility evaluation. He has had a lot of falls, stumbles a lot.  He has burning from  "the stomach down to his legs that has been worse in the past few months.     Medications:  Current Outpatient Medications   Medication Sig    ascorbic acid, vitamin C, (VITAMIN C) 1000 MG tablet Take 1,000 mg by mouth.    b complex vitamins tablet Take 1 tablet by mouth once daily.    BIOTIN ORAL Take by mouth.    cholecalciferol, vitamin D3, 10,000 unit Tab Take by mouth.     coenzyme Q10 (CO Q-10) 10 mg capsule Take 10 mg by mouth once daily.    cyanocobalamin (VITAMIN B-12) 1000 MCG tablet Take 1,000 mcg by mouth.    multivitamin (ONE DAILY MULTIVITAMIN) per tablet Take 1 tablet by mouth.    OLIVE LEAF EXTRACT ORAL Take by mouth.    omega-3 acid ethyl esters (LOVAZA) 1 gram capsule Take 1 g by mouth.    TURMERIC ORAL Take 1 tablet by mouth once daily.    UNABLE TO FIND Sunflower Lecithin    UNABLE TO FIND Fulvic Mineral    UNABLE TO FIND Black seed oil    UNABLE TO FIND Sphingolin    UNABLE TO FIND Amyloban       SOCIAL HISTORY  Social History     Tobacco Use    Smoking status: Never Smoker    Smokeless tobacco: Never Used   Substance Use Topics    Alcohol use: No     Frequency: 2-4 times a month     Drinks per session: 3 or 4     Binge frequency: Never    Drug use: No       Living arrangements - the patient lives alone.    ROS:    REVIEW OF SYMPTOMS 6/12/2020   Do you feel abnormally tired on most days? Yes--always feels fatigued    Do you feel you generally sleep well? No--he can't sleep at night; he states that his sleep hours are screwed up    Do you have difficulty controlling your bladder?  Yes--he has trouble emptying his bladder; he also has urgency; he denies UTIs   Do you have difficulty controlling your bowels?  No--he is juicing, which helps to regulate bowels   Do you have frequent muscle cramps, tightness or spasms in your limbs?  Yes--he has "restless leg" symptoms and has a lot of stiffness   Do you have new visual symptoms?  No--gets blurry when overheated   Do you have " "worsening difficulty with your memory or thinking? No issues    Do you have worsening symptoms of anxiety or depression?  Yes--he admits to some thoughts of hurting himself, but states "I'd never do it." He also states that he wishes he would get hit by a truck.    For patients who walk, Do you have more difficulty walking?  Yes   Have you fallen since your last visit?  Yes    For patients who use wheelchairs: Do you have any skin wounds or breakdown? No   Do you have difficulty using your hands?  Yes--hands are numb and very weak and not coordinated; he can't write anymore; very difficult to get dressed, make food, etc.   Do you have shooting or burning pain? Yes--burning from stomach down    Do you have difficulty with sexual function?  Yes   If you are sexually active, are you using birth control? Y/N  N/A Not Applicable   Do you often choke when swallowing liquids or solid food?  No   Do you experience worsening symptoms when overheated? Yes--can't take hot showers or baths   Do you need any new equipment such as a wheelchair, walker or shower chair? No--would like to get a scooter    Do you receive co-pay financial assistance for your principal MS medicine? N/A   Would you be interested in participating in an MS research trial in the future? Yes   For patients on Gilenya, Tecfidera, Aubagio, Rituxan, Ocrevus, Tysabri, Lemtrada or Methotrexate, are you aware that you should NOT receive live virus vaccines?  Not Applicable   Do you feel you have adequate family/friend support?  Yes   Do you have health insurance?   Yes   Are you currently employed? No   Do you receive SSDI/SSI?  Yes   Do you use marijuana or cannabis products? No   Have you been diagnosed with a urinary tract infection since your last visit here? No   Have you been diagnosed with a respiratory tract infection since your last visit here? No   Have you been to the emergency room since your last visit here? No   Have you been hospitalized since your " last visit here?  No            Objective:          Neurologic Exam  Neuro exam deferred    Imaging:       Results for orders placed during the hospital encounter of 09/10/19   MRI Brain Demyelinating W W/O Contrast    Impression There is no significant change compared to the MRI from February 2019.  T2 FLAIR hyperintense white matter lesions are consistent with demyelinating plaques and multiple sclerosis.  No abnormal enhancement as may occur with active demyelination.      Electronically signed by: Elliot Kramer Jr., MD  Date:    09/10/2019  Time:    11:34     Results for orders placed during the hospital encounter of 02/14/19   MRI Cervical Spine Demyelinating W W/O Contrast    Impression Multifocal signal abnormality throughout the length of the cervical and upper thoracic cord, in keeping with demyelinating plaque in this patient with known history of demyelinating disease.  Some cord atrophy also noted from the levels of C3-4 through C4-5.  No abnormal enhancement to suggest active demyelination.    Multilevel degenerative disc disease contributing to multilevel spinal canal or neural foraminal stenosis, most severe at the levels of C5-6 and C6-7.      Electronically signed by: Girma Higginbotham MD  Date:    02/14/2019  Time:    14:49     Results for orders placed during the hospital encounter of 02/14/19   MRI Thoracic Spine Demyelinating W W/O Contrast    Impression Multifocal signal abnormality throughout the thoracic cord, in keeping with demyelinating plaque in this patient with known history of demyelinating disease.    2 small foci of enhancement noted at the level of T6 and T6-7, concerning for active sites of demyelination.      Electronically signed by: Girma Higginbotham MD  Date:    02/14/2019  Time:    15:08         Labs:     Lab Results   Component Value Date    PLQOOOYH66UZ 51 08/30/2019    WUNGACQF21QG 50 02/12/2019     Lab Results   Component Value Date    JCVINDEX 2.91 (A) 02/12/2019     JCVANTIBODY Positive (A) 02/12/2019     Lab Results   Component Value Date    TF7TEQYD 73.0 02/12/2019    ABSOLUTECD3 1547 02/12/2019    PJ0CAPGR 21.9 02/12/2019    ABSOLUTECD8 465 02/12/2019    EE9LTUPZ 46.9 02/12/2019    ABSOLUTECD4 994 02/12/2019    LABCD48 2.14 02/12/2019     Lab Results   Component Value Date    WBC 6.74 08/30/2019    RBC 5.27 08/30/2019    HGB 16.0 08/30/2019    HCT 48.5 08/30/2019    MCV 92 08/30/2019    MCH 30.4 08/30/2019    MCHC 33.0 08/30/2019    RDW 11.5 08/30/2019     08/30/2019    MPV 10.2 08/30/2019    GRAN 3.8 08/30/2019    GRAN 56.6 08/30/2019    LYMPH 1.8 08/30/2019    LYMPH 27.2 08/30/2019    MONO 0.6 08/30/2019    MONO 9.3 08/30/2019    EOS 0.4 08/30/2019    BASO 0.09 08/30/2019    EOSINOPHIL 5.2 08/30/2019    BASOPHIL 1.3 08/30/2019     Sodium   Date Value Ref Range Status   02/12/2019 140 136 - 145 mmol/L Final     Potassium   Date Value Ref Range Status   02/12/2019 4.2 3.5 - 5.1 mmol/L Final     Chloride   Date Value Ref Range Status   02/12/2019 102 95 - 110 mmol/L Final     CO2   Date Value Ref Range Status   02/12/2019 28 23 - 29 mmol/L Final     Glucose   Date Value Ref Range Status   02/12/2019 97 70 - 110 mg/dL Final     BUN, Bld   Date Value Ref Range Status   02/12/2019 17 6 - 20 mg/dL Final     Creatinine   Date Value Ref Range Status   09/10/2019 1.2 0.5 - 1.4 mg/dL Final     Calcium   Date Value Ref Range Status   02/12/2019 10.2 8.7 - 10.5 mg/dL Final     Total Protein   Date Value Ref Range Status   02/12/2019 8.3 6.0 - 8.4 g/dL Final     Albumin   Date Value Ref Range Status   02/12/2019 4.5 3.5 - 5.2 g/dL Final     Total Bilirubin   Date Value Ref Range Status   02/12/2019 0.6 0.1 - 1.0 mg/dL Final     Comment:     For infants and newborns, interpretation of results should be based  on gestational age, weight and in agreement with clinical  observations.  Premature Infant recommended reference ranges:  Up to 24 hours.............<8.0 mg/dL  Up to 48  hours............<12.0 mg/dL  3-5 days..................<15.0 mg/dL  6-29 days.................<15.0 mg/dL       Alkaline Phosphatase   Date Value Ref Range Status   02/12/2019 58 55 - 135 U/L Final     AST   Date Value Ref Range Status   02/12/2019 13 10 - 40 U/L Final     ALT   Date Value Ref Range Status   02/12/2019 16 10 - 44 U/L Final     Anion Gap   Date Value Ref Range Status   02/12/2019 10 8 - 16 mmol/L Final     eGFR if    Date Value Ref Range Status   09/10/2019 >60 >60 mL/min/1.73 m^2 Final     eGFR if non    Date Value Ref Range Status   09/10/2019 >60 >60 mL/min/1.73 m^2 Final     Comment:     Calculation used to obtain the estimated glomerular filtration  rate (eGFR) is the CKD-EPI equation.        Lab Results   Component Value Date    HEPBSAG Negative 08/30/2019    HEPBSAB Negative 08/30/2019    HEPBCAB Negative 08/30/2019           MS Impression and Plan:     NEURO MULTIPLE SCLEROSIS IMPRESSION:   MS Status:     Clinical Progression:  Worsened  Plan:     DMT:  No change in management    DMT comment:  He does not want to proceed with Ocrevus. We briefly discussed [employer]. He is not very interested in this.     Symptom Management:  Implement change in symptom management    Implement Change in Symptom Management:  Pain, Sleep and Spasticity (Will send order for pain cream to [facility]; recommend magnesium glycinate 400mg for sleep;will send him stretching exercises )       Will talk with Suri Puente LCSW for counseling and possible options for employment with help of LRS.   I will talk with Dr. Bella next week about options for DMT or possible participation in clinical trials.     He is interested in a scooter. I I have placed referral for mobility evaluation to Dr. Jimenez.       Our visit today lasted 31 minutes. Over 50% of this visit included discussion of the treatment plan/symptom management/coordination of care. The patient agrees with the plan of  care.    SYED Davila, CNS     Problem List Items Addressed This Visit     None      Visit Diagnoses     Multiple sclerosis    -  Primary    Relevant Orders    MRI Brain Demyelinating W W/O Contrast    MRI Cervical Spine Demyelinating W W/O Contrast    MRI Thoracic Spine Demyelinating W W/O Contrast    Vitamin D

## 2020-06-15 ENCOUNTER — TELEPHONE (OUTPATIENT)
Dept: CARDIOLOGY | Facility: CLINIC | Age: 40
End: 2020-06-15

## 2020-06-18 ENCOUNTER — DOCUMENTATION ONLY (OUTPATIENT)
Dept: NEUROLOGY | Facility: CLINIC | Age: 40
End: 2020-06-18

## 2020-06-29 ENCOUNTER — OFFICE VISIT (OUTPATIENT)
Dept: NEUROLOGY | Facility: CLINIC | Age: 40
End: 2020-06-29
Payer: MEDICAID

## 2020-06-29 DIAGNOSIS — G35 MULTIPLE SCLEROSIS: Primary | ICD-10-CM

## 2020-06-29 DIAGNOSIS — Z71.89 COUNSELING REGARDING GOALS OF CARE: ICD-10-CM

## 2020-06-29 DIAGNOSIS — Z74.09 IMPAIRED MOBILITY: ICD-10-CM

## 2020-06-29 DIAGNOSIS — R26.9 GAIT DISTURBANCE: ICD-10-CM

## 2020-06-29 PROCEDURE — 99214 PR OFFICE/OUTPT VISIT, EST, LEVL IV, 30-39 MIN: ICD-10-PCS | Mod: 95,,, | Performed by: PSYCHIATRY & NEUROLOGY

## 2020-06-29 PROCEDURE — 99214 OFFICE O/P EST MOD 30 MIN: CPT | Mod: 95,,, | Performed by: PSYCHIATRY & NEUROLOGY

## 2020-06-29 NOTE — PROGRESS NOTES
"Subjective:    The patient location is: home  The chief complaint leading to consultation is: MS  Visit type: Virtual visit with synchronous audio and video  Total time spent with patient: 25   minutes  Each patient to whom he or she provides medical services by telemedicine is:  (1) informed of the relationship between the physician and patient and the respective role of any other health care provider with respect to management of the patient; and (2) notified that he or she may decline to receive medical services by telemedicine and may withdraw from such care at any time.        Patient ID: Larry Hinds is a 40 y.o. male who presents today for a routine clinic visit for MS.      MS HPI:  · DMT:  none  · Side effects from DMT?   · Taking vitamin D3 as recommended? Yes -     · States "I've lost my walk".   · Living alone  · Nelly referred him for a scooter eval with Dr. Jimenez.    · Right now, just wall walking and furniture surfing.   · Has a rollator--received it in January;   · Getting MRIs tomorrow in .   · Had one dose of "exosome" therapy in [place]--IV and via the "gums".   · He's not interested in any conventional immunotherapy.  Would be interested in experimental therapy that could help demyelination    Medications:  Current Outpatient Medications   Medication Sig    ascorbic acid, vitamin C, (VITAMIN C) 1000 MG tablet Take 1,000 mg by mouth.    b complex vitamins tablet Take 1 tablet by mouth once daily.    BIOTIN ORAL Take by mouth.    cholecalciferol, vitamin D3, 10,000 unit Tab Take by mouth.     coenzyme Q10 (CO Q-10) 10 mg capsule Take 10 mg by mouth once daily.    cyanocobalamin (VITAMIN B-12) 1000 MCG tablet Take 1,000 mcg by mouth.    multivitamin (ONE DAILY MULTIVITAMIN) per tablet Take 1 tablet by mouth.    OLIVE LEAF EXTRACT ORAL Take by mouth.    omega-3 acid ethyl esters (LOVAZA) 1 gram capsule Take 1 g by mouth.    TURMERIC ORAL Take 1 tablet by mouth once daily.    UNABLE " TO FIND Sunflower Lecithin    UNABLE TO FIND Fulvic Mineral    UNABLE TO FIND Black seed oil    UNABLE TO FIND Sphingolin    UNABLE TO FIND Amyloban     No current facility-administered medications for this visit.        SOCIAL HISTORY  Social History     Tobacco Use    Smoking status: Never Smoker    Smokeless tobacco: Never Used   Substance Use Topics    Alcohol use: No     Frequency: 2-4 times a month     Drinks per session: 3 or 4     Binge frequency: Never    Drug use: No       REVIEW OF SYMPTOMS 6/27/2020   Do you feel abnormally tired on most days? Yes   Do you feel you generally sleep well? No   Do you have difficulty controlling your bladder?  Yes   Do you have difficulty controlling your bowels?  No   Do you have frequent muscle cramps, tightness or spasms in your limbs?  Yes   Do you have new visual symptoms?  No   Do you have worsening difficulty with your memory or thinking? No   Do you have worsening symptoms of anxiety or depression?  No   For patients who walk, Do you have more difficulty walking?  Yes   Have you fallen since your last visit?  Yes   For patients who use wheelchairs: Do you have any skin wounds or breakdown? Not Applicable   Do you have difficulty using your hands?  Yes   Do you have shooting or burning pain? Yes   Do you have difficulty with sexual function?  Yes   If you are sexually active, are you using birth control? Y/N  N/A Not Applicable   Do you often choke when swallowing liquids or solid food?  No   Do you experience worsening symptoms when overheated? Yes   Do you need any new equipment such as a wheelchair, walker or shower chair? Yes   Do you receive co-pay financial assistance for your principal MS medicine? Yes   Would you be interested in participating in an MS research trial in the future? Yes   For patients on Gilenya, Tecfidera, Aubagio, Rituxan, Ocrevus, Tysabri, Lemtrada or Methotrexate, are you aware that you should NOT receive live virus vaccines?  Not  Applicable   Do you feel you have adequate family/friend support?  No   Do you have health insurance?   Yes   Are you currently employed? No   Do you receive SSDI/SSI?  Yes   Do you use marijuana or cannabis products? No   Have you been diagnosed with a urinary tract infection since your last visit here? No   Have you been diagnosed with a respiratory tract infection since your last visit here? No   Have you been to the emergency room since your last visit here? No   Have you been hospitalized since your last visit here?  No       Diagnosis/Assessment/Plan:    1. Multiple Sclerosis  · Assessment: Pt reports progressive worsening; MRIs stable; he defers any conventional DMT despite our recs. Will explore whether or not he's a candidate for BETSEY 188 trial--his prior stem cell therapies may be an exclusion  · Imaging: planned June 2021    Keep currently scheduled September appt.        Our visit today lasted 25 minutes, and 100% of this time was spent face to face with the patient. Over 50% of this visit included discussion of the treatment plan/medication changes/symptom management/exam findings/imaging results/coordination of care. The patient agrees with the plan of care.     Problem List Items Addressed This Visit        3     Gait disturbance      Other Visit Diagnoses     Multiple sclerosis    -  Primary    Impaired mobility        Counseling regarding goals of care

## 2020-06-30 ENCOUNTER — HOSPITAL ENCOUNTER (OUTPATIENT)
Dept: RADIOLOGY | Facility: HOSPITAL | Age: 40
Discharge: HOME | End: 2020-06-30
Attending: CLINICAL NURSE SPECIALIST
Payer: MEDICAID

## 2020-06-30 DIAGNOSIS — G35 MULTIPLE SCLEROSIS: ICD-10-CM

## 2020-06-30 PROCEDURE — A9585 GADOBUTROL INJECTION: HCPCS | Performed by: CLINICAL NURSE SPECIALIST

## 2020-06-30 PROCEDURE — 25500020 PHARM REV CODE 255: Performed by: CLINICAL NURSE SPECIALIST

## 2020-06-30 PROCEDURE — 72157 MRI THORACIC SPINE DEMYELINATING W W/O CONTRAST: ICD-10-PCS | Mod: 26,,, | Performed by: RADIOLOGY

## 2020-06-30 PROCEDURE — 72157 MRI CHEST SPINE W/O & W/DYE: CPT | Mod: 26,,, | Performed by: RADIOLOGY

## 2020-06-30 PROCEDURE — 70553 MRI BRAIN STEM W/O & W/DYE: CPT | Mod: 26,,, | Performed by: RADIOLOGY

## 2020-06-30 PROCEDURE — 70553 MRI BRAIN DEMYELINATING W/ WO CONTRAST: ICD-10-PCS | Mod: 26,,, | Performed by: RADIOLOGY

## 2020-06-30 PROCEDURE — 72156 MRI NECK SPINE W/O & W/DYE: CPT | Mod: 26,,, | Performed by: RADIOLOGY

## 2020-06-30 PROCEDURE — 72157 MRI CHEST SPINE W/O & W/DYE: CPT | Mod: TC

## 2020-06-30 PROCEDURE — 70553 MRI BRAIN STEM W/O & W/DYE: CPT | Mod: TC

## 2020-06-30 PROCEDURE — 72156 MRI NECK SPINE W/O & W/DYE: CPT | Mod: TC

## 2020-06-30 PROCEDURE — 72156 MRI CERVICAL SPINE DEMYELINATING W W/O CONTRAST: ICD-10-PCS | Mod: 26,,, | Performed by: RADIOLOGY

## 2020-06-30 RX ORDER — GADOBUTROL 604.72 MG/ML
8 INJECTION INTRAVENOUS
Status: COMPLETED | OUTPATIENT
Start: 2020-06-30 | End: 2020-06-30

## 2020-06-30 RX ADMIN — GADOBUTROL 8 ML: 604.72 INJECTION INTRAVENOUS at 03:06

## 2020-07-21 ENCOUNTER — TELEPHONE (OUTPATIENT)
Dept: RESEARCH | Facility: HOSPITAL | Age: 40
End: 2020-07-21

## 2020-08-18 ENCOUNTER — DOCUMENTATION ONLY (OUTPATIENT)
Dept: NEUROLOGY | Facility: CLINIC | Age: 40
End: 2020-08-18

## 2020-09-24 ENCOUNTER — HOSPITAL ENCOUNTER (INPATIENT)
Facility: HOSPITAL | Age: 40
LOS: 2 days | Discharge: HOME | DRG: 059 | End: 2020-09-26
Attending: EMERGENCY MEDICINE | Admitting: INTERNAL MEDICINE
Payer: MEDICAID

## 2020-09-24 ENCOUNTER — PATIENT MESSAGE (OUTPATIENT)
Dept: NEUROLOGY | Facility: CLINIC | Age: 40
End: 2020-09-24

## 2020-09-24 DIAGNOSIS — G35 MULTIPLE SCLEROSIS EXACERBATION: ICD-10-CM

## 2020-09-24 DIAGNOSIS — G35 MULTIPLE SCLEROSIS: ICD-10-CM

## 2020-09-24 DIAGNOSIS — R29.898 WEAKNESS OF BOTH LOWER EXTREMITIES: Primary | ICD-10-CM

## 2020-09-24 DIAGNOSIS — R53.1 WEAKNESS: ICD-10-CM

## 2020-09-24 DIAGNOSIS — B34.9 VIRAL SYNDROME: ICD-10-CM

## 2020-09-24 PROBLEM — R26.2 UNABLE TO AMBULATE: Status: ACTIVE | Noted: 2020-09-24

## 2020-09-24 PROBLEM — R68.89 FLU-LIKE SYMPTOMS: Status: ACTIVE | Noted: 2020-09-24

## 2020-09-24 PROBLEM — R65.10 SIRS DUE TO NON-INFECTIOUS PROCESS WITHOUT ACUTE ORGAN DYSFUNCTION: Status: ACTIVE | Noted: 2020-09-24

## 2020-09-24 LAB
ALBUMIN SERPL BCP-MCNC: 4.5 G/DL (ref 3.5–5.2)
ALP SERPL-CCNC: 60 U/L (ref 55–135)
ALT SERPL W/O P-5'-P-CCNC: 22 U/L (ref 10–44)
ANION GAP SERPL CALC-SCNC: 11 MMOL/L (ref 8–16)
AST SERPL-CCNC: 16 U/L (ref 10–40)
BASOPHILS # BLD AUTO: 0.05 K/UL (ref 0–0.2)
BASOPHILS NFR BLD: 0.4 % (ref 0–1.9)
BILIRUB SERPL-MCNC: 0.9 MG/DL (ref 0.1–1)
BILIRUB UR QL STRIP: NEGATIVE
BNP SERPL-MCNC: 14 PG/ML (ref 0–99)
BUN SERPL-MCNC: 9 MG/DL (ref 6–20)
CALCIUM SERPL-MCNC: 9.6 MG/DL (ref 8.7–10.5)
CHLORIDE SERPL-SCNC: 101 MMOL/L (ref 95–110)
CLARITY UR: CLEAR
CO2 SERPL-SCNC: 26 MMOL/L (ref 23–29)
COLOR UR: YELLOW
CREAT SERPL-MCNC: 1.3 MG/DL (ref 0.5–1.4)
DIFFERENTIAL METHOD: ABNORMAL
EOSINOPHIL # BLD AUTO: 0.4 K/UL (ref 0–0.5)
EOSINOPHIL NFR BLD: 3.2 % (ref 0–8)
ERYTHROCYTE [DISTWIDTH] IN BLOOD BY AUTOMATED COUNT: 11.4 % (ref 11.5–14.5)
EST. GFR  (AFRICAN AMERICAN): >60 ML/MIN/1.73 M^2
EST. GFR  (NON AFRICAN AMERICAN): >60 ML/MIN/1.73 M^2
GLUCOSE SERPL-MCNC: 99 MG/DL (ref 70–110)
GLUCOSE UR QL STRIP: NEGATIVE
GROUP A STREP, MOLECULAR: NEGATIVE
HCT VFR BLD AUTO: 46.1 % (ref 40–54)
HGB BLD-MCNC: 15.6 G/DL (ref 14–18)
HGB UR QL STRIP: NEGATIVE
IMM GRANULOCYTES # BLD AUTO: 0.04 K/UL (ref 0–0.04)
IMM GRANULOCYTES NFR BLD AUTO: 0.3 % (ref 0–0.5)
KETONES UR QL STRIP: NEGATIVE
LACTATE SERPL-SCNC: 1.5 MMOL/L (ref 0.5–2.2)
LEUKOCYTE ESTERASE UR QL STRIP: NEGATIVE
LYMPHOCYTES # BLD AUTO: 1.5 K/UL (ref 1–4.8)
LYMPHOCYTES NFR BLD: 12.5 % (ref 18–48)
MCH RBC QN AUTO: 31.3 PG (ref 27–31)
MCHC RBC AUTO-ENTMCNC: 33.8 G/DL (ref 32–36)
MCV RBC AUTO: 92 FL (ref 82–98)
MONOCYTES # BLD AUTO: 1.3 K/UL (ref 0.3–1)
MONOCYTES NFR BLD: 11.1 % (ref 4–15)
NEUTROPHILS # BLD AUTO: 8.5 K/UL (ref 1.8–7.7)
NEUTROPHILS NFR BLD: 72.5 % (ref 38–73)
NITRITE UR QL STRIP: NEGATIVE
NRBC BLD-RTO: 0 /100 WBC
PH UR STRIP: 8 [PH] (ref 5–8)
PLATELET # BLD AUTO: 203 K/UL (ref 150–350)
PMV BLD AUTO: 10.9 FL (ref 9.2–12.9)
POTASSIUM SERPL-SCNC: 3.7 MMOL/L (ref 3.5–5.1)
PROCALCITONIN SERPL IA-MCNC: 0.04 NG/ML
PROT SERPL-MCNC: 7.9 G/DL (ref 6–8.4)
PROT UR QL STRIP: NEGATIVE
RBC # BLD AUTO: 4.99 M/UL (ref 4.6–6.2)
SARS-COV-2 RDRP RESP QL NAA+PROBE: NEGATIVE
SODIUM SERPL-SCNC: 138 MMOL/L (ref 136–145)
SP GR UR STRIP: 1.02 (ref 1–1.03)
URN SPEC COLLECT METH UR: NORMAL
UROBILINOGEN UR STRIP-ACNC: NEGATIVE EU/DL
WBC # BLD AUTO: 11.72 K/UL (ref 3.9–12.7)

## 2020-09-24 PROCEDURE — 11000001 HC ACUTE MED/SURG PRIVATE ROOM

## 2020-09-24 PROCEDURE — 99285 EMERGENCY DEPT VISIT HI MDM: CPT | Mod: 25

## 2020-09-24 PROCEDURE — 36415 COLL VENOUS BLD VENIPUNCTURE: CPT

## 2020-09-24 PROCEDURE — 93010 EKG 12-LEAD: ICD-10-PCS | Mod: ,,, | Performed by: INTERNAL MEDICINE

## 2020-09-24 PROCEDURE — 25000003 PHARM REV CODE 250: Performed by: EMERGENCY MEDICINE

## 2020-09-24 PROCEDURE — 85025 COMPLETE CBC W/AUTO DIFF WBC: CPT

## 2020-09-24 PROCEDURE — 87651 STREP A DNA AMP PROBE: CPT

## 2020-09-24 PROCEDURE — 25000003 PHARM REV CODE 250: Performed by: INTERNAL MEDICINE

## 2020-09-24 PROCEDURE — 87040 BLOOD CULTURE FOR BACTERIA: CPT

## 2020-09-24 PROCEDURE — G0378 HOSPITAL OBSERVATION PER HR: HCPCS

## 2020-09-24 PROCEDURE — 96374 THER/PROPH/DIAG INJ IV PUSH: CPT

## 2020-09-24 PROCEDURE — 93010 ELECTROCARDIOGRAM REPORT: CPT | Mod: ,,, | Performed by: INTERNAL MEDICINE

## 2020-09-24 PROCEDURE — 83605 ASSAY OF LACTIC ACID: CPT

## 2020-09-24 PROCEDURE — 81003 URINALYSIS AUTO W/O SCOPE: CPT

## 2020-09-24 PROCEDURE — U0002 COVID-19 LAB TEST NON-CDC: HCPCS

## 2020-09-24 PROCEDURE — 83880 ASSAY OF NATRIURETIC PEPTIDE: CPT

## 2020-09-24 PROCEDURE — 80053 COMPREHEN METABOLIC PANEL: CPT

## 2020-09-24 PROCEDURE — 63600175 PHARM REV CODE 636 W HCPCS: Performed by: EMERGENCY MEDICINE

## 2020-09-24 PROCEDURE — 84145 PROCALCITONIN (PCT): CPT

## 2020-09-24 PROCEDURE — 96361 HYDRATE IV INFUSION ADD-ON: CPT

## 2020-09-24 PROCEDURE — 93005 ELECTROCARDIOGRAM TRACING: CPT

## 2020-09-24 RX ORDER — DEXAMETHASONE SODIUM PHOSPHATE 4 MG/ML
4 INJECTION, SOLUTION INTRA-ARTICULAR; INTRALESIONAL; INTRAMUSCULAR; INTRAVENOUS; SOFT TISSUE
Status: COMPLETED | OUTPATIENT
Start: 2020-09-24 | End: 2020-09-24

## 2020-09-24 RX ORDER — MAG HYDROX/ALUMINUM HYD/SIMETH 200-200-20
30 SUSPENSION, ORAL (FINAL DOSE FORM) ORAL EVERY 6 HOURS PRN
Status: DISCONTINUED | OUTPATIENT
Start: 2020-09-24 | End: 2020-09-26 | Stop reason: HOSPADM

## 2020-09-24 RX ORDER — SODIUM CHLORIDE 9 MG/ML
INJECTION, SOLUTION INTRAVENOUS CONTINUOUS
Status: ACTIVE | OUTPATIENT
Start: 2020-09-24 | End: 2020-09-25

## 2020-09-24 RX ORDER — ONDANSETRON 2 MG/ML
4 INJECTION INTRAMUSCULAR; INTRAVENOUS EVERY 8 HOURS PRN
Status: DISCONTINUED | OUTPATIENT
Start: 2020-09-24 | End: 2020-09-26 | Stop reason: HOSPADM

## 2020-09-24 RX ORDER — METHYLPREDNISOLONE 4 MG/1
TABLET ORAL
Qty: 1 PACKAGE | Refills: 0 | Status: SHIPPED | OUTPATIENT
Start: 2020-09-24 | End: 2020-09-26 | Stop reason: HOSPADM

## 2020-09-24 RX ORDER — IPRATROPIUM BROMIDE AND ALBUTEROL SULFATE 2.5; .5 MG/3ML; MG/3ML
3 SOLUTION RESPIRATORY (INHALATION) EVERY 4 HOURS PRN
Status: DISCONTINUED | OUTPATIENT
Start: 2020-09-24 | End: 2020-09-26 | Stop reason: HOSPADM

## 2020-09-24 RX ORDER — FAMOTIDINE 20 MG/1
20 TABLET, FILM COATED ORAL 2 TIMES DAILY
Status: DISCONTINUED | OUTPATIENT
Start: 2020-09-24 | End: 2020-09-26 | Stop reason: HOSPADM

## 2020-09-24 RX ORDER — DIPHENHYDRAMINE HCL 25 MG
25 CAPSULE ORAL EVERY 6 HOURS PRN
Status: DISCONTINUED | OUTPATIENT
Start: 2020-09-24 | End: 2020-09-26 | Stop reason: HOSPADM

## 2020-09-24 RX ORDER — GUAIFENESIN 100 MG/5ML
200 SOLUTION ORAL EVERY 4 HOURS PRN
Status: DISCONTINUED | OUTPATIENT
Start: 2020-09-24 | End: 2020-09-26 | Stop reason: HOSPADM

## 2020-09-24 RX ORDER — ACETAMINOPHEN 500 MG
1000 TABLET ORAL
Status: COMPLETED | OUTPATIENT
Start: 2020-09-24 | End: 2020-09-24

## 2020-09-24 RX ORDER — ACETAMINOPHEN 325 MG/1
650 TABLET ORAL EVERY 6 HOURS PRN
Status: DISCONTINUED | OUTPATIENT
Start: 2020-09-24 | End: 2020-09-26 | Stop reason: HOSPADM

## 2020-09-24 RX ORDER — IBUPROFEN 800 MG/1
800 TABLET ORAL
Status: COMPLETED | OUTPATIENT
Start: 2020-09-24 | End: 2020-09-24

## 2020-09-24 RX ADMIN — FAMOTIDINE 20 MG: 20 TABLET ORAL at 10:09

## 2020-09-24 RX ADMIN — DEXAMETHASONE SODIUM PHOSPHATE 4 MG: 4 INJECTION, SOLUTION INTRAMUSCULAR; INTRAVENOUS at 07:09

## 2020-09-24 RX ADMIN — SODIUM CHLORIDE 1000 ML: 0.9 INJECTION, SOLUTION INTRAVENOUS at 05:09

## 2020-09-24 RX ADMIN — SODIUM CHLORIDE: 0.9 INJECTION, SOLUTION INTRAVENOUS at 11:09

## 2020-09-24 RX ADMIN — IBUPROFEN 800 MG: 800 TABLET, FILM COATED ORAL at 05:09

## 2020-09-24 RX ADMIN — ACETAMINOPHEN 1000 MG: 500 TABLET ORAL at 05:09

## 2020-09-25 PROBLEM — B34.9 VIRAL SYNDROME: Status: ACTIVE | Noted: 2020-09-25

## 2020-09-25 LAB
ANION GAP SERPL CALC-SCNC: 6 MMOL/L (ref 8–16)
BASOPHILS # BLD AUTO: 0.02 K/UL (ref 0–0.2)
BASOPHILS NFR BLD: 0.2 % (ref 0–1.9)
BUN SERPL-MCNC: 11 MG/DL (ref 6–20)
CALCIUM SERPL-MCNC: 8.9 MG/DL (ref 8.7–10.5)
CHLORIDE SERPL-SCNC: 108 MMOL/L (ref 95–110)
CO2 SERPL-SCNC: 25 MMOL/L (ref 23–29)
CREAT SERPL-MCNC: 1 MG/DL (ref 0.5–1.4)
DIFFERENTIAL METHOD: ABNORMAL
EOSINOPHIL # BLD AUTO: 0 K/UL (ref 0–0.5)
EOSINOPHIL NFR BLD: 0.1 % (ref 0–8)
ERYTHROCYTE [DISTWIDTH] IN BLOOD BY AUTOMATED COUNT: 11.2 % (ref 11.5–14.5)
EST. GFR  (AFRICAN AMERICAN): >60 ML/MIN/1.73 M^2
EST. GFR  (NON AFRICAN AMERICAN): >60 ML/MIN/1.73 M^2
GLUCOSE SERPL-MCNC: 159 MG/DL (ref 70–110)
HCT VFR BLD AUTO: 40.4 % (ref 40–54)
HGB BLD-MCNC: 13.5 G/DL (ref 14–18)
IMM GRANULOCYTES # BLD AUTO: 0.04 K/UL (ref 0–0.04)
IMM GRANULOCYTES NFR BLD AUTO: 0.5 % (ref 0–0.5)
LYMPHOCYTES # BLD AUTO: 0.6 K/UL (ref 1–4.8)
LYMPHOCYTES NFR BLD: 7.4 % (ref 18–48)
MCH RBC QN AUTO: 31 PG (ref 27–31)
MCHC RBC AUTO-ENTMCNC: 33.4 G/DL (ref 32–36)
MCV RBC AUTO: 93 FL (ref 82–98)
MONOCYTES # BLD AUTO: 0.4 K/UL (ref 0.3–1)
MONOCYTES NFR BLD: 4.8 % (ref 4–15)
NEUTROPHILS # BLD AUTO: 7.4 K/UL (ref 1.8–7.7)
NEUTROPHILS NFR BLD: 87 % (ref 38–73)
NRBC BLD-RTO: 0 /100 WBC
PLATELET # BLD AUTO: 178 K/UL (ref 150–350)
PMV BLD AUTO: 11.2 FL (ref 9.2–12.9)
POTASSIUM SERPL-SCNC: 4.7 MMOL/L (ref 3.5–5.1)
RBC # BLD AUTO: 4.35 M/UL (ref 4.6–6.2)
SODIUM SERPL-SCNC: 139 MMOL/L (ref 136–145)
WBC # BLD AUTO: 8.48 K/UL (ref 3.9–12.7)

## 2020-09-25 PROCEDURE — 85025 COMPLETE CBC W/AUTO DIFF WBC: CPT

## 2020-09-25 PROCEDURE — 36415 COLL VENOUS BLD VENIPUNCTURE: CPT

## 2020-09-25 PROCEDURE — 25000003 PHARM REV CODE 250: Performed by: INTERNAL MEDICINE

## 2020-09-25 PROCEDURE — 97530 THERAPEUTIC ACTIVITIES: CPT

## 2020-09-25 PROCEDURE — A9585 GADOBUTROL INJECTION: HCPCS | Performed by: INTERNAL MEDICINE

## 2020-09-25 PROCEDURE — 80048 BASIC METABOLIC PNL TOTAL CA: CPT

## 2020-09-25 PROCEDURE — 99232 PR SUBSEQUENT HOSPITAL CARE,LEVL II: ICD-10-PCS | Mod: ,,, | Performed by: PSYCHIATRY & NEUROLOGY

## 2020-09-25 PROCEDURE — 25500020 PHARM REV CODE 255: Performed by: INTERNAL MEDICINE

## 2020-09-25 PROCEDURE — 11000001 HC ACUTE MED/SURG PRIVATE ROOM

## 2020-09-25 PROCEDURE — 99232 SBSQ HOSP IP/OBS MODERATE 35: CPT | Mod: ,,, | Performed by: PSYCHIATRY & NEUROLOGY

## 2020-09-25 PROCEDURE — 97162 PT EVAL MOD COMPLEX 30 MIN: CPT

## 2020-09-25 PROCEDURE — 87541 LEGION PNEUMO DNA AMP PROB: CPT

## 2020-09-25 PROCEDURE — 99900038 HC OT GENERIC THERAPY SCREENING (STAT)

## 2020-09-25 PROCEDURE — 97166 OT EVAL MOD COMPLEX 45 MIN: CPT

## 2020-09-25 RX ORDER — GADOBUTROL 604.72 MG/ML
10 INJECTION INTRAVENOUS
Status: COMPLETED | OUTPATIENT
Start: 2020-09-25 | End: 2020-09-25

## 2020-09-25 RX ADMIN — FAMOTIDINE 20 MG: 20 TABLET ORAL at 10:09

## 2020-09-25 RX ADMIN — GADOBUTROL 7 ML: 604.72 INJECTION INTRAVENOUS at 12:09

## 2020-09-25 RX ADMIN — FAMOTIDINE 20 MG: 20 TABLET ORAL at 08:09

## 2020-09-25 NOTE — ASSESSMENT & PLAN NOTE
Patient states that he has been unable to ambulate for the past two days at home.  Uses a motorized scooter at baseline.  -MS exacerbation suspected- symptom improvement noted upon exam   PT/OT to evaluate and treat.

## 2020-09-25 NOTE — CHAPLAIN
Initial visit with patient.  Provided support through listening, presence, and prayer.  Pt took a good amount of time to talk about his condition.  Pt does struggle with his diagnosis but does seem to remain strong despite the difficulties he is currently going through.  Pt mentioned that he has good support from family members who live near by.  I took time to pray for him before leaving and will follow up as needed.    Chaplain Carlos Wills M.Div., [facility]

## 2020-09-25 NOTE — ASSESSMENT & PLAN NOTE
Concern for MS exacerbation versus other etiologies.  Neurology consulted.  MRI brain, MRI cervical spine, MRI lumbar spine pending.  -Steroids continued- improved strength/mobility reported

## 2020-09-25 NOTE — PLAN OF CARE
CM met with the patient at the bedside to assess for discharge needs.  He lives at home alone and has a electric wheelchair and rolling walker that he uses at home.  CM discussed the recommendation of inpatient rehab, however patient has declined making a referral to inpatient rehab.  Patient does not anticipate any discharge needs at this time.  CM provided a transitional care folder, information on advanced directives, information on pharmacy bedside delivery, and discharge planning begins on admission with contact information for any needs/questions.     D/C Plan: home  PCP: Dr Sahni  [facility]erred [facility]acy[facility]  Discharge transportation: Father  My Gelacioner: Active  Pharmacy Bedside Delivery:  No, likely weekend discharge.     09/25/20 1508   Discharge Assessment   Assessment Type Discharge Planning Assessment   Confirmed/corrected address and phone number on facesheet? Yes   Assessment information obtained from? Patient;Medical Record   Expected Length of Stay (days)   (1-2)   Communicated expected length of stay with patient/caregiver yes   Prior to hospitilization cognitive status: Alert/Oriented   Prior to hospitalization functional status: Independent;Assistive Equipment   Current cognitive status: Alert/Oriented   Current Functional Status: Independent;Assistive Equipment   Facility Arrived From: Home   Lives With alone   Able to Return to Prior Arrangements yes   Is patient able to care for self after discharge? Yes   Who are your caregiver(s) and their phone number(s)? Rui Hinds, brother [phone]   Patient's perception of discharge disposition home or selfcare   Patient currently being followed by outpatient case management? No   Patient currently receives any other outside agency services? No   Equipment Currently Used at Home wheelchair;walker, rolling   Do you have any problems affording any of your prescribed medications? No   Is the patient taking medications as prescribed?   (Not currently  taking any prescription medications)   Does the patient have transportation home? Yes   Transportation Anticipated family or friend will provide   Dialysis Name and Scheduled days NA   Does the patient receive services at the [facility]? No   Discharge Plan A Home   Discharge Plan B Home   DME Needed Upon Discharge  none   Patient/Family in Agreement with Plan yes

## 2020-09-25 NOTE — ASSESSMENT & PLAN NOTE
Fever 100.5, HR 60 is to 80s.  BP stable.  No leukocytosis, no bandemia.  Lactic acid within normal limits.  Chest x-ray without infiltrates, masses or effusions.  -Blood cultures with no growth to date   -Strep negative   -Influenza pending   -COVID 19 results negative

## 2020-09-25 NOTE — H&P
"[facility]  History & Physical    Patient Name: Larry Hinds  MRN: [id number]  Admission Date: 9/24/2020  Attending Physician: Sal Wheeler, *   Primary Care Provider: Primary Doctor No         Patient information was obtained from patient, past medical records and ER records.     Subjective:     Principal Problem:Weakness of both lower extremities    Chief Complaint:   Chief Complaint   Patient presents with    Sore Throat     sore throat, chills.  Hx of MS - pt states he feels more "paralyzed than normal"        HPI: Mr. Hinds is a 40-year-old  male with PMH significant for multiple sclerosis diagnosed in 2014, followed by Dr. glaser 10 on the all lids, but has not seen him in many months, and has not been on any medications for the past many months.  Patient currently lives alone at home, helps himself with a motorized scooter.  However two days ago he started complaining of extreme fatigue, generalized weakness, associated with fever, chills.  He was unable to ambulate on his own at home, unable to move his bilateral lower extremities.  He fell off his motorized scooter, could not get off the floor for many hours yesterday.  Denies hitting his head.  He reports symptoms of fever, chills, generalized weakness, congestion, flu-like symptoms".  Bilateral lower extremity weakness, unable to move his legs.  In the ED there was concern for exacerbation of MS.  Dr. Rosado with Neurology was consulted, recommended MRI brain, cervical and thoracic spine.  Patient received dexamethasone 4 mg IV x1 in the ED.  Laboratory workup is otherwise unremarkable.  COVID-19 negative.    Admitting diagnosis:  Bilateral lower extremity weakness, concern for possible MS exacerbation versus flu-like symptoms.    Past Medical History:   Diagnosis Date    Multiple sclerosis     Transverse myelitis        History reviewed. No pertinent surgical history.    Review of patient's " allergies indicates:  No Known Allergies    No current facility-administered medications on file prior to encounter.      Current Outpatient Medications on File Prior to Encounter   Medication Sig    ascorbic acid, vitamin C, (VITAMIN C) 1000 MG tablet Take 1,000 mg by mouth.    b complex vitamins tablet Take 1 tablet by mouth once daily.    BIOTIN ORAL Take by mouth.    cholecalciferol, vitamin D3, 10,000 unit Tab Take by mouth.     coenzyme Q10 (CO Q-10) 10 mg capsule Take 10 mg by mouth once daily.    cyanocobalamin (VITAMIN B-12) 1000 MCG tablet Take 1,000 mcg by mouth.    multivitamin (ONE DAILY MULTIVITAMIN) per tablet Take 1 tablet by mouth.    OLIVE LEAF EXTRACT ORAL Take by mouth.    omega-3 acid ethyl esters (LOVAZA) 1 gram capsule Take 1 g by mouth.    TURMERIC ORAL Take 1 tablet by mouth once daily.    UNABLE TO FIND Sunflower Lecithin    UNABLE TO FIND Fulvic Mineral    UNABLE TO FIND Black seed oil    UNABLE TO FIND Sphingolin    UNABLE TO FIND Amyloban     Family History     Problem Relation (Age of Onset)    Cancer Mother, Father, Maternal Grandmother, Maternal Grandfather    Glaucoma Father    Stroke Father        Tobacco Use    Smoking status: Never Smoker    Smokeless tobacco: Never Used   Substance and Sexual Activity    Alcohol use: No     Frequency: 2-4 times a month     Drinks per session: 3 or 4     Binge frequency: Never    Drug use: No    Sexual activity: Not on file     Review of Systems   Constitutional: Positive for appetite change, fatigue and fever.   HENT: Negative.  Negative for congestion, nosebleeds and sore throat.    Eyes: Negative.  Negative for photophobia, redness and visual disturbance.   Respiratory: Positive for cough (Dry nonproductive). Negative for shortness of breath and wheezing.    Cardiovascular: Negative.  Negative for chest pain, palpitations and leg swelling.   Gastrointestinal: Negative.  Negative for abdominal pain, constipation, diarrhea,  nausea and vomiting.   Endocrine: Negative.  Negative for polyuria.   Genitourinary: Negative.  Negative for dysuria, flank pain, frequency and urgency.   Musculoskeletal: Negative.  Negative for arthralgias, back pain and joint swelling.   Skin: Negative.  Negative for color change, pallor and rash.   Allergic/Immunologic: Negative.  Negative for immunocompromised state.   Neurological: Positive for weakness (Generalized.  Unable to move his bilateral lower extremities were the past two days, slowly improving.). Negative for dizziness, syncope, light-headedness, numbness and headaches.   Hematological: Negative.    Psychiatric/Behavioral: Negative.  Negative for confusion and hallucinations. The patient is not nervous/anxious.    All other systems reviewed and are negative.    Objective:     Vital Signs (Most Recent):  Temp: (!) 100.5 °F (38.1 °C) (09/24/20 1539)  Pulse: (!) 55 (09/24/20 2030)  Resp: 16 (09/24/20 2030)  BP: 109/67 (09/24/20 2030)  SpO2: 95 % (09/24/20 2030) Vital Signs (24h Range):  Temp:  [100.5 °F (38.1 °C)] 100.5 °F (38.1 °C)  Pulse:  [55-82] 55  Resp:  [13-19] 16  SpO2:  [95 %-99 %] 95 %  BP: (106-136)/(58-75) 109/67     Weight: 78 kg (171 lb 15.3 oz)  Body mass index is 22.69 kg/m².    Physical Exam  Vitals signs and nursing note reviewed.   Constitutional:       General: He is not in acute distress.     Appearance: Normal appearance. He is well-developed. He is not diaphoretic.   HENT:      Head: Normocephalic and atraumatic.      Mouth/Throat:      Mouth: Mucous membranes are moist.   Eyes:      General: No scleral icterus.     Conjunctiva/sclera: Conjunctivae normal.      Pupils: Pupils are equal, round, and reactive to light.   Neck:      Musculoskeletal: Normal range of motion and neck supple.      Thyroid: No thyromegaly.   Cardiovascular:      Rate and Rhythm: Normal rate and regular rhythm.      Heart sounds: Normal heart sounds. No murmur.   Pulmonary:      Effort: Pulmonary effort is  normal. No respiratory distress.      Breath sounds: Normal breath sounds. No wheezing.   Chest:      Chest wall: No tenderness.   Abdominal:      General: Bowel sounds are normal.      Palpations: Abdomen is soft.      Tenderness: There is no abdominal tenderness.   Musculoskeletal: Normal range of motion.         General: No swelling, tenderness or deformity.   Lymphadenopathy:      Cervical: No cervical adenopathy.   Skin:     General: Skin is warm and dry.   Neurological:      Mental Status: He is alert and oriented to person, place, and time.      Cranial Nerves: No cranial nerve deficit.      Motor: Weakness (Motor strength bilateral lower extremities decreased.  Unable to lift them off the bed.  Bilateral upper extremities motor strength and sensation intact.) present. No abnormal muscle tone.      Coordination: Coordination normal.   Psychiatric:         Mood and Affect: Mood normal.         Behavior: Behavior normal.         Thought Content: Thought content normal.           CRANIAL NERVES     CN III, IV, VI   Pupils are equal, round, and reactive to light.       Significant Labs:   Results for orders placed or performed during the hospital encounter of 09/24/20   Group A Strep, Molecular   Result Value Ref Range    Group A Strep, Molecular Negative Negative   COVID-19 Rapid Screening   Result Value Ref Range    SARS-CoV-2 RNA, Amplification, Qual Negative Negative   CBC auto differential   Result Value Ref Range    WBC 11.72 3.90 - 12.70 K/uL    RBC 4.99 4.60 - 6.20 M/uL    Hemoglobin 15.6 14.0 - 18.0 g/dL    Hematocrit 46.1 40.0 - 54.0 %    Mean Corpuscular Volume 92 82 - 98 fL    Mean Corpuscular Hemoglobin 31.3 (H) 27.0 - 31.0 pg    Mean Corpuscular Hemoglobin Conc 33.8 32.0 - 36.0 g/dL    RDW 11.4 (L) 11.5 - 14.5 %    Platelets 203 150 - 350 K/uL    MPV 10.9 9.2 - 12.9 fL    Immature Granulocytes 0.3 0.0 - 0.5 %    Gran # (ANC) 8.5 (H) 1.8 - 7.7 K/uL    Immature Grans (Abs) 0.04 0.00 - 0.04 K/uL     Lymph # 1.5 1.0 - 4.8 K/uL    Mono # 1.3 (H) 0.3 - 1.0 K/uL    Eos # 0.4 0.0 - 0.5 K/uL    Baso # 0.05 0.00 - 0.20 K/uL    nRBC 0 0 /100 WBC    Gran% 72.5 38.0 - 73.0 %    Lymph% 12.5 (L) 18.0 - 48.0 %    Mono% 11.1 4.0 - 15.0 %    Eosinophil% 3.2 0.0 - 8.0 %    Basophil% 0.4 0.0 - 1.9 %    Differential Method Automated    Comprehensive metabolic panel   Result Value Ref Range    Sodium 138 136 - 145 mmol/L    Potassium 3.7 3.5 - 5.1 mmol/L    Chloride 101 95 - 110 mmol/L    CO2 26 23 - 29 mmol/L    Glucose 99 70 - 110 mg/dL    BUN, Bld 9 6 - 20 mg/dL    Creatinine 1.3 0.5 - 1.4 mg/dL    Calcium 9.6 8.7 - 10.5 mg/dL    Total Protein 7.9 6.0 - 8.4 g/dL    Albumin 4.5 3.5 - 5.2 g/dL    Total Bilirubin 0.9 0.1 - 1.0 mg/dL    Alkaline Phosphatase 60 55 - 135 U/L    AST 16 10 - 40 U/L    ALT 22 10 - 44 U/L    Anion Gap 11 8 - 16 mmol/L    eGFR if African American >60 >60 mL/min/1.73 m^2    eGFR if non African American >60 >60 mL/min/1.73 m^2   Lactic acid, plasma #1   Result Value Ref Range    Lactate (Lactic Acid) 1.5 0.5 - 2.2 mmol/L   Urinalysis, Reflex to Urine Culture Urine, Clean Catch    Specimen: Urine   Result Value Ref Range    Specimen UA Urine, Clean Catch     Color, UA Yellow Yellow, Straw, Nelly    Appearance, UA Clear Clear    pH, UA 8.0 5.0 - 8.0    Specific Gravity, UA 1.020 1.005 - 1.030    Protein, UA Negative Negative    Glucose, UA Negative Negative    Ketones, UA Negative Negative    Bilirubin (UA) Negative Negative    Occult Blood UA Negative Negative    Nitrite, UA Negative Negative    Urobilinogen, UA Negative <2.0 EU/dL    Leukocytes, UA Negative Negative   Procalcitonin   Result Value Ref Range    Procalcitonin 0.04 <0.25 ng/mL   Brain natriuretic peptide   Result Value Ref Range    BNP 14 0 - 99 pg/mL         Significant Imaging: I have reviewed and interpreted all pertinent imaging results/findings within the past 24 hours.     Imaging Results          X-Ray Chest AP Portable (Final result)   Result time 09/24/20 16:42:07    Final result by Chan Saenz MD (09/24/20 16:42:07)                 Impression:      No acute abnormality.      Electronically signed by: Chan Saenz  Date:    09/24/2020  Time:    16:42             Narrative:    EXAMINATION:  XR CHEST AP PORTABLE    CLINICAL HISTORY:  cough;    TECHNIQUE:  Single frontal view of the chest was performed.    COMPARISON:  None    FINDINGS:  The lungs are clear, with normal appearance of pulmonary vasculature and no pleural effusion or pneumothorax.    The cardiac silhouette is normal in size. The hilar and mediastinal contours are unremarkable.    Bones are intact.                                I have independently reviewed all pertinent labs within the past 24 hours.    I have independently reviewed, visualized and interpreted all pertinent imaging results within the past 24 hours and discussed the findings with the ED physician, Dr. Gruber            Assessment/Plan:     * Weakness of both lower extremities  Concern for ex.  acerbation of multiple sclerosis versus generalized weakness due to flu-like symptoms.  COVID-19 negative  PT/OT evaluation.  Per Dr. Rosado, neurology, follow up on MRI brain, cervical and thoracic spines.        Flu-like symptoms  COVID-19 negative.  Supportive care.  Gentle IV fluids.  Antitussives and antipyretics as needed.      SIRS due to non-infectious process without acute organ dysfunction  Fever 100.5, HR 60 is to 80s.  BP stable.  No leukocytosis, no bandemia.  Lactic acid within normal limits.  Chest x-ray without infiltrates, masses or effusions.        Unable to ambulate  Patient states that he has been unable to ambulate for the past two days at home.  Uses a motorized scooter at baseline.  PT/OT to evaluate and treat.      Multiple sclerosis exacerbation  Concern for MS exacerbation versus other etiologies.  Neurology consulted.  MRI brain, MRI cervical spine, MRI lumbar spine pending.        VTE Risk  Mitigation (From admission, onward)         Ordered     Place sequential compression device  Until discontinued      09/24/20 2110                 The patient is placed in OBSERVATION status.      Kenrick Laws MD  Department of Hospital Medicine   [facility]

## 2020-09-25 NOTE — SUBJECTIVE & OBJECTIVE
Interval History: pt stable upon exam and reports increased strength and mobility to BLE (R>L).  Steroids continued.  Neurology consulted.  CM to assist with discharge and placement if needed. Influenza pending.     Review of Systems   Constitutional: Positive for appetite change, fatigue and fever.   HENT: Negative.  Negative for congestion, nosebleeds and sore throat.    Eyes: Negative.  Negative for photophobia, redness and visual disturbance.   Respiratory: Positive for cough (Dry nonproductive). Negative for shortness of breath and wheezing.    Cardiovascular: Negative.  Negative for chest pain, palpitations and leg swelling.   Gastrointestinal: Negative.  Negative for abdominal pain, constipation, diarrhea, nausea and vomiting.   Endocrine: Negative.  Negative for polyuria.   Genitourinary: Negative.  Negative for dysuria, flank pain, frequency and urgency.   Musculoskeletal: Negative.  Negative for arthralgias, back pain and joint swelling.   Skin: Negative.  Negative for color change, pallor and rash.   Allergic/Immunologic: Negative.  Negative for immunocompromised state.   Neurological: Positive for weakness (Generalized.  Unable to move his bilateral lower extremities were the past two days, slowly improving.) and numbness. Negative for dizziness, syncope, light-headedness and headaches.   Hematological: Negative.    Psychiatric/Behavioral: Negative.  Negative for confusion and hallucinations. The patient is not nervous/anxious.    All other systems reviewed and are negative.    Objective:     Vital Signs (Most Recent):  Temp: 98.3 °F (36.8 °C) (09/25/20 0723)  Pulse: (!) 56 (09/25/20 0723)  Resp: 20 (09/25/20 0723)  BP: 104/63 (09/25/20 0723)  SpO2: 98 % (09/25/20 0723) Vital Signs (24h Range):  Temp:  [97 °F (36.1 °C)-100.5 °F (38.1 °C)] 98.3 °F (36.8 °C)  Pulse:  [50-82] 56  Resp:  [13-20] 20  SpO2:  [95 %-99 %] 98 %  BP: (104-145)/(58-75) 104/63     Weight: 78.4 kg (172 lb 13.5 oz)  Body mass index is  22.8 kg/m².    Intake/Output Summary (Last 24 hours) at 9/25/2020 1326  Last data filed at 9/24/2020 1847  Gross per 24 hour   Intake 1000 ml   Output --   Net 1000 ml      Physical Exam  Vitals signs and nursing note reviewed.   Constitutional:       General: He is not in acute distress.     Appearance: Normal appearance. He is well-developed. He is not diaphoretic.   HENT:      Head: Normocephalic and atraumatic.      Mouth/Throat:      Mouth: Mucous membranes are moist.   Eyes:      General: No scleral icterus.     Conjunctiva/sclera: Conjunctivae normal.      Pupils: Pupils are equal, round, and reactive to light.   Neck:      Musculoskeletal: Normal range of motion and neck supple.      Thyroid: No thyromegaly.   Cardiovascular:      Rate and Rhythm: Regular rhythm. Bradycardia present.      Heart sounds: Normal heart sounds. No murmur.   Pulmonary:      Effort: Pulmonary effort is normal. No respiratory distress.      Breath sounds: Normal breath sounds. No wheezing.   Chest:      Chest wall: No tenderness.   Abdominal:      General: Bowel sounds are normal.      Palpations: Abdomen is soft.      Tenderness: There is no abdominal tenderness.   Musculoskeletal: Normal range of motion.         General: No swelling, tenderness or deformity.   Lymphadenopathy:      Cervical: No cervical adenopathy.   Skin:     General: Skin is warm and dry.   Neurological:      Mental Status: He is alert and oriented to person, place, and time.      Cranial Nerves: No cranial nerve deficit.      Motor: Weakness (Motor strength bilateral lower extremities decreased but improved.  Pt able to lift legs off the bed (L>R). Bilateral upper extremities motor strength and sensation intact.f) present. No abnormal muscle tone.      Coordination: Coordination normal.      Comments: Decreased strength and dexterity in hands with numbness reported   Psychiatric:         Mood and Affect: Mood normal.         Behavior: Behavior normal.          Thought Content: Thought content normal.         Significant Labs:   CBC:   Recent Labs   Lab 09/24/20  1650 09/25/20  0659   WBC 11.72 8.48   HGB 15.6 13.5*   HCT 46.1 40.4    178     CMP:   Recent Labs   Lab 09/24/20  1650 09/25/20  0659    139   K 3.7 4.7    108   CO2 26 25   GLU 99 159*   BUN 9 11   CREATININE 1.3 1.0   CALCIUM 9.6 8.9   PROT 7.9  --    ALBUMIN 4.5  --    BILITOT 0.9  --    ALKPHOS 60  --    AST 16  --    ALT 22  --    ANIONGAP 11 6*   EGFRNONAA >60 >60       Significant Imaging:   Imaging Results          MRI Brain W WO Contrast (In process)  Result time 09/25/20 08:44:02               MRI Cervical Spine W WO Cont (In process)                MRI Thoracic Spine W WO Cont (In process)                X-Ray Chest AP Portable (Final result)  Result time 09/24/20 16:42:07    Final result by Chan Saenz MD (09/24/20 16:42:07)                 Impression:      No acute abnormality.      Electronically signed by: Chan Saenz  Date:    09/24/2020  Time:    16:42             Narrative:    EXAMINATION:  XR CHEST AP PORTABLE    CLINICAL HISTORY:  cough;    TECHNIQUE:  Single frontal view of the chest was performed.    COMPARISON:  None    FINDINGS:  The lungs are clear, with normal appearance of pulmonary vasculature and no pleural effusion or pneumothorax.    The cardiac silhouette is normal in size. The hilar and mediastinal contours are unremarkable.    Bones are intact.

## 2020-09-25 NOTE — PT/OT/SLP EVAL
Occupational Therapy   Evaluation    Name: Larry Hinds  MRN: [id number]  Admitting Diagnosis:  Weakness of both lower extremities      Recommendations:     Discharge Recommendations: rehabilitation facility  Discharge Equipment Recommendations:     Barriers to discharge:       Assessment:     Larry Hinds is a 40 y.o. male with a medical diagnosis of Weakness of both lower extremities.  He presents with DEBILITY AND GENERQLIZED WEAKNESS. Performance deficits affecting function: weakness, gait instability, impaired endurance, impaired balance, impaired self care skills, impaired functional mobilty.      Rehab Prognosis: Fair; patient would benefit from acute skilled OT services to address these deficits and reach maximum level of function.       Plan:     Patient to be seen 3 x/week to address the above listed problems via self-care/home management, therapeutic activities, therapeutic exercises  · Plan of Care Expires:    · Plan of Care Reviewed with: patient    Subjective     Chief Complaint: DEBILITY AND GENERALIZED WEAKNESS  Patient/Family Comments/goals:   Occupational Profile:  Living Environment: LIVES ALONE 1 ST FLOOR HANDICAP APARTMENT WITH NO STEPS TO ENETER  Previous level of function: (I) WITH ADL'S, MOD (I) WITH FUNCTIONAL MOBILITY  Roles and Routines: OCCUPATIONAL; THERAPY  Equipment Used at Home:  wheelchair, walker, rolling  Assistance upon Discharge:     Pain/Comfort:  · Pain Rating 1: 2/10    Patients cultural, spiritual, Yazidism conflicts given the current situation:      Objective:     Communicated with: NURSE AND Epic CHART REVIEW prior to session.  Patient found up in chair with   upon OT entry to room.    General Precautions: Standard, fall   Orthopedic Precautions:N/A   Braces: N/A     Occupational Performance:    Bed Mobility:    · Patient completed Rolling/Turning to Right with minimum assistance  · Patient completed Scooting/Bridging with minimum assistance  · Patient completed Supine  "to Sit with minimum assistance    Functional Mobility/Transfers:  · Patient completed Sit <> Stand Transfer with minimum assistance  with  rolling walker   · Patient completed Bed <> Chair Transfer using Step Transfer technique with minimum assistance with rolling walker and UNSTEADY GAIT    Activities of Daily Living:  · Upper Body Dressing: minimum assistance PING HOSP[facility] ROBE  · Lower Body Dressing: moderate assistance PING/ DOFF SOCKS . PLOF PT REPORTED     Cognitive/Visual Perceptual:  Cognitive/Psychosocial Skills:     -       Oriented to: Person, Place, Time and Situation   -       Follows Commands/attention:Follows two-step commands  -       Communication: clear/fluent  -       Memory: No Deficits noted  -       Safety awareness/insight to disability: impaired     Physical Exam:  Upper Extremity Range of Motion:     -       Right Upper Extremity: WFL  -       Left Upper Extremity: WFL  Upper Extremity Strength:    -       Right Upper Extremity: MMT":3/56 GROSSLY  -       Left Upper Extremity: MMT: 3/5 GROSSLY   Strength:    -       Right Upper Extremity: MMT: 3/5 GROSSLY  -       Left Upper Extremity: MMT: 3/5 GROSSLY       AMPAC 6 Click ADL:  AMPAC Total Score: 19    Treatment & Education:  PT WITH DEFICITS WITH ADL'S, FUNCTIONAL MOBILITY AND TRANSFERS. PT MAY CONTINUE TO BENEFIT FROM SKILLED OT. SEE ABOVE EVAL FOR DETAILS.   Education:    Patient left up in chair with all lines intact and call button in reach    GOALS:   Multidisciplinary Problems     Occupational Therapy Goals        Problem: Occupational Therapy Goal    Goal Priority Disciplines Outcome Interventions   Occupational Therapy Goal     OT, PT/OT     Description: OT GOALS TO BE MET BY 10-2-2020  SBA WITH UE DRESSING  PT WILL TOLERATE 1 SET X 15 REPS B UE ROM EXERCISE   PT WILL BE SBA WITH BED>BSC T/F'S                     History:     Past Medical History:   Diagnosis Date    Multiple sclerosis     Transverse myelitis        History " reviewed. No pertinent surgical history.    Time Tracking:     OT Date of Treatment: 09/25/20  OT Start Time: 1130  OT Stop Time: 1200  OT Total Time (min): 30 min    Billable Minutes:Evaluation 15 MINUTES  Therapeutic Activity 15 MINUTES    Jeaneth Wharton, OT  9/25/2020

## 2020-09-25 NOTE — ASSESSMENT & PLAN NOTE
Fever 100.5, HR 60 is to 80s.  BP stable.  No leukocytosis, no bandemia.  Lactic acid within normal limits.  Chest x-ray without infiltrates, masses or effusions.

## 2020-09-25 NOTE — HOSPITAL COURSE
"Pt admitted to Medical Surgical Unit for weakness to bilateral lower extremities in the setting of Multiple Sclerosis. Neurology consulted.  Steroids initiated.  Pt verbalized symptom improvement with increased mobility and strength noted to BLE upon exam.  Pt also reports "flu-like" symptoms with COVID 19 negative.  Chest xray negative.  Influenza tests pending.   consulted to assist with discharge planning and placement if needed.  Pt states he is followed outpatient by Dr. Hawkins and has not been on any medication for approximately 4 months.  Bradycardia noted on monitor. On 9/26/20, pt continued to verbalize symptom improvement with increased strength and mobility noted upon exam.  MRI results negative for any new lesions.  Case discussed with Neurology.  PT/OT evaluation completed with Inpatient Rehab recommended.  Pt refused placement to Rehab facility at this time.  Home health not available to patient however he states that he has gym equipment at home and assistance from his brother to perform exercises at home.  Ambulatory referral to Palliative Care placed.  Pt seen and examined and deemed suitable for discharge to home.  Current medications resumed with Robitussin prescribed.  Pt instructed to follow up with PCP and Neurology (established Provider) upon discharge for further evaluation.   "

## 2020-09-25 NOTE — ASSESSMENT & PLAN NOTE
COVID-19 negative.  Supportive care.  Gentle IV fluids.  Antitussives and antipyretics as needed.

## 2020-09-25 NOTE — ASSESSMENT & PLAN NOTE
COVID-19 negative.  Supportive care.  Gentle IV fluids.  Antitussives and antipyretics as needed.  -Influenza pending

## 2020-09-25 NOTE — ASSESSMENT & PLAN NOTE
Concern for ex.  acerbation of multiple sclerosis versus generalized weakness due to flu-like symptoms.  COVID-19 negative  PT/OT evaluation.  Per Dr. Rosado, neurology, follow up on MRI brain, cervical and thoracic spines.

## 2020-09-25 NOTE — PT/OT/SLP PROGRESS
Occupational Therapy      Patient Name:  Larry Hinds   MRN:  [id number]    EVAL INITIATED VIA CHART REVIEW. PT IN PROCEDURE. WILL COMPLETE EVAL AT LATER DATE AND OR TIME. Will follow-up ON LATER DATE  Jeaneth Wharton OT  9/25/2020   0931

## 2020-09-25 NOTE — ASSESSMENT & PLAN NOTE
Concern for MS exacerbation versus other etiologies.  Neurology consulted.  MRI brain, MRI cervical spine, MRI lumbar spine pending.

## 2020-09-25 NOTE — ASSESSMENT & PLAN NOTE
-Neurology consulted   -MRI results pending   -Steroids initiated   -pt followed outpatient by Dr. Hawkins   -pt denies taking any medications and is using natural remedies for symptom management

## 2020-09-25 NOTE — PROGRESS NOTES
"[facility] Hospital Medicine  Progress Note    Patient Name: Larry Hinds  MRN: [id number]  Patient Class: IP- Inpatient   Admission Date: 9/24/2020  Length of Stay: 0 days  Attending Physician: Donaldo Barboza MD  Primary Care Provider: Primary Doctor No        Subjective:     Principal Problem:Weakness of both lower extremities        HPI:  Mr. Hinds is a 40-year-old  male with PMH significant for multiple sclerosis diagnosed in 2014, followed by Dr. glaser 10 on the all lids, but has not seen him in many months, and has not been on any medications for the past many months.  Patient currently lives alone at home, helps himself with a motorized scooter.  However two days ago he started complaining of extreme fatigue, generalized weakness, associated with fever, chills.  He was unable to ambulate on his own at home, unable to move his bilateral lower extremities.  He fell off his motorized scooter, could not get off the floor for many hours yesterday.  Denies hitting his head.  He reports symptoms of fever, chills, generalized weakness, congestion, flu-like symptoms".  Bilateral lower extremity weakness, unable to move his legs.  In the ED there was concern for exacerbation of MS.  Dr. Rosado with Neurology was consulted, recommended MRI brain, cervical and thoracic spine.  Patient received dexamethasone 4 mg IV x1 in the ED.  Laboratory workup is otherwise unremarkable.  COVID-19 negative.    Admitting diagnosis:  Bilateral lower extremity weakness, concern for possible MS exacerbation versus flu-like symptoms.    Overview/Hospital Course:  Pt admitted to Medical Surgical Unit for weakness to bilateral lower extremities in the setting of Multiple Sclerosis. Neurology consulted.  Steroids initiated.  Pt verbalized symptom improvement with increased mobility and strength noted to BLE upon exam.  Pt also reports "flu-like" symptoms with COVID 19 negative.  Chest xray negative.  Influenza " tests pending.   consulted to assist with discharge planning and placement if needed.  Pt states he is followed outpatient by Dr. Hawkins and has not been on any medication for approximately 4 months.  Bradycardia noted on monitor.     Interval History: pt stable upon exam and reports increased strength and mobility to BLE (R>L).  Steroids continued.  Neurology consulted.  CM to assist with discharge and placement if needed. Influenza pending.     Review of Systems   Constitutional: Positive for appetite change, fatigue and fever.   HENT: Negative.  Negative for congestion, nosebleeds and sore throat.    Eyes: Negative.  Negative for photophobia, redness and visual disturbance.   Respiratory: Positive for cough (Dry nonproductive). Negative for shortness of breath and wheezing.    Cardiovascular: Negative.  Negative for chest pain, palpitations and leg swelling.   Gastrointestinal: Negative.  Negative for abdominal pain, constipation, diarrhea, nausea and vomiting.   Endocrine: Negative.  Negative for polyuria.   Genitourinary: Negative.  Negative for dysuria, flank pain, frequency and urgency.   Musculoskeletal: Negative.  Negative for arthralgias, back pain and joint swelling.   Skin: Negative.  Negative for color change, pallor and rash.   Allergic/Immunologic: Negative.  Negative for immunocompromised state.   Neurological: Positive for weakness (Generalized.  Unable to move his bilateral lower extremities were the past two days, slowly improving.) and numbness. Negative for dizziness, syncope, light-headedness and headaches.   Hematological: Negative.    Psychiatric/Behavioral: Negative.  Negative for confusion and hallucinations. The patient is not nervous/anxious.    All other systems reviewed and are negative.    Objective:     Vital Signs (Most Recent):  Temp: 98.3 °F (36.8 °C) (09/25/20 0723)  Pulse: (!) 56 (09/25/20 0723)  Resp: 20 (09/25/20 0723)  BP: 104/63 (09/25/20 0723)  SpO2: 98 %  (09/25/20 0723) Vital Signs (24h Range):  Temp:  [97 °F (36.1 °C)-100.5 °F (38.1 °C)] 98.3 °F (36.8 °C)  Pulse:  [50-82] 56  Resp:  [13-20] 20  SpO2:  [95 %-99 %] 98 %  BP: (104-145)/(58-75) 104/63     Weight: 78.4 kg (172 lb 13.5 oz)  Body mass index is 22.8 kg/m².    Intake/Output Summary (Last 24 hours) at 9/25/2020 1326  Last data filed at 9/24/2020 1847  Gross per 24 hour   Intake 1000 ml   Output --   Net 1000 ml      Physical Exam  Vitals signs and nursing note reviewed.   Constitutional:       General: He is not in acute distress.     Appearance: Normal appearance. He is well-developed. He is not diaphoretic.   HENT:      Head: Normocephalic and atraumatic.      Mouth/Throat:      Mouth: Mucous membranes are moist.   Eyes:      General: No scleral icterus.     Conjunctiva/sclera: Conjunctivae normal.      Pupils: Pupils are equal, round, and reactive to light.   Neck:      Musculoskeletal: Normal range of motion and neck supple.      Thyroid: No thyromegaly.   Cardiovascular:      Rate and Rhythm: Regular rhythm. Bradycardia present.      Heart sounds: Normal heart sounds. No murmur.   Pulmonary:      Effort: Pulmonary effort is normal. No respiratory distress.      Breath sounds: Normal breath sounds. No wheezing.   Chest:      Chest wall: No tenderness.   Abdominal:      General: Bowel sounds are normal.      Palpations: Abdomen is soft.      Tenderness: There is no abdominal tenderness.   Musculoskeletal: Normal range of motion.         General: No swelling, tenderness or deformity.   Lymphadenopathy:      Cervical: No cervical adenopathy.   Skin:     General: Skin is warm and dry.   Neurological:      Mental Status: He is alert and oriented to person, place, and time.      Cranial Nerves: No cranial nerve deficit.      Motor: Weakness (Motor strength bilateral lower extremities decreased but improved.  Pt able to lift legs off the bed (L>R). Bilateral upper extremities motor strength and sensation  intact.f) present. No abnormal muscle tone.      Coordination: Coordination normal.      Comments: Decreased strength and dexterity in hands with numbness reported   Psychiatric:         Mood and Affect: Mood normal.         Behavior: Behavior normal.         Thought Content: Thought content normal.         Significant Labs:   CBC:   Recent Labs   Lab 09/24/20  1650 09/25/20  0659   WBC 11.72 8.48   HGB 15.6 13.5*   HCT 46.1 40.4    178     CMP:   Recent Labs   Lab 09/24/20  1650 09/25/20  0659    139   K 3.7 4.7    108   CO2 26 25   GLU 99 159*   BUN 9 11   CREATININE 1.3 1.0   CALCIUM 9.6 8.9   PROT 7.9  --    ALBUMIN 4.5  --    BILITOT 0.9  --    ALKPHOS 60  --    AST 16  --    ALT 22  --    ANIONGAP 11 6*   EGFRNONAA >60 >60       Significant Imaging:   Imaging Results          MRI Brain W WO Contrast (In process)  Result time 09/25/20 08:44:02               MRI Cervical Spine W WO Cont (In process)                MRI Thoracic Spine W WO Cont (In process)                X-Ray Chest AP Portable (Final result)  Result time 09/24/20 16:42:07    Final result by Chan Saenz MD (09/24/20 16:42:07)                 Impression:      No acute abnormality.      Electronically signed by: Chan Saenz  Date:    09/24/2020  Time:    16:42             Narrative:    EXAMINATION:  XR CHEST AP PORTABLE    CLINICAL HISTORY:  cough;    TECHNIQUE:  Single frontal view of the chest was performed.    COMPARISON:  None    FINDINGS:  The lungs are clear, with normal appearance of pulmonary vasculature and no pleural effusion or pneumothorax.    The cardiac silhouette is normal in size. The hilar and mediastinal contours are unremarkable.    Bones are intact.                                Assessment/Plan:      * Weakness of both lower extremities  -symptoms improved with steroids   Concern for exacerbation of multiple sclerosis versus generalized weakness due to flu-like symptoms.  COVID-19 negative  PT/OT  evaluation.  Per Dr. Rosado, neurology, follow up on MRI brain, cervical and thoracic spines-results pending         Viral syndrome  -COVID 19 negative   -Influenza pending   -IV hydration   -antiemetics and antipyretics as needed   -supportive care       Multiple sclerosis exacerbation  Concern for MS exacerbation versus other etiologies.  Neurology consulted.  MRI brain, MRI cervical spine, MRI lumbar spine pending.  -Steroids continued- improved strength/mobility reported       Unable to ambulate  Patient states that he has been unable to ambulate for the past two days at home.  Uses a motorized scooter at baseline.  -MS exacerbation suspected- symptom improvement noted upon exam   PT/OT to evaluate and treat.      Flu-like symptoms  COVID-19 negative.  Supportive care.  Gentle IV fluids.  Antitussives and antipyretics as needed.  -Influenza pending       SIRS due to non-infectious process without acute organ dysfunction  Fever 100.5, HR 60 is to 80s.  BP stable.  No leukocytosis, no bandemia.  Lactic acid within normal limits.  Chest x-ray without infiltrates, masses or effusions.  -Blood cultures with no growth to date   -Strep negative   -Influenza pending   -COVID 19 results negative         MS (multiple sclerosis)  -Neurology consulted   -MRI results pending   -Steroids initiated   -pt followed outpatient by Dr. Hawkins   -pt denies taking any medications and is using natural remedies for symptom management         VTE Risk Mitigation (From admission, onward)         Ordered     Place sequential compression device  Until discontinued      09/24/20 2110                Discharge Planning   ELVIRA:      Code Status: Not on file   Is the patient medically ready for discharge?:     Reason for patient still in hospital (select all that apply): Patient trending condition, Treatment, Imaging and Consult recommendations                     Alisha Watts NP  Department of Hospital Medicine   [facility]

## 2020-09-25 NOTE — ASSESSMENT & PLAN NOTE
-symptoms improved with steroids   Concern for exacerbation of multiple sclerosis versus generalized weakness due to flu-like symptoms.  COVID-19 negative  PT/OT evaluation.  Per Dr. Rosado, neurology, follow up on MRI brain, cervical and thoracic spines-results pending

## 2020-09-25 NOTE — ASSESSMENT & PLAN NOTE
-COVID 19 negative   -Influenza pending   -IV hydration   -antiemetics and antipyretics as needed   -supportive care

## 2020-09-25 NOTE — ED PROVIDER NOTES
"SCRIBE #1 NOTE: I, Lea Pride, am scribing for, and in the presence of, Clay Temple Do, MD. I have scribed the entire note.       History     Chief Complaint   Patient presents with    Sore Throat     sore throat, chills.  Hx of MS - pt states he feels more "paralyzed than normal"     Review of patient's allergies indicates:  No Known Allergies      History of Present Illness     HPI    9/24/2020, 8:04 PM  History obtained from the patient      History of Present Illness: Larry Hinds is a 40 y.o. male patient with a PMHx of Multiple sclerosis and transverse myelitis who presents to the Emergency Department for evaluation of extremity weakness which onset gradually several days PTA. While the chief complaint by nursing assessment was documented as sore throat, the patient indicates their chief complaint during my interview as extremity weakness. Pt is normally able to walk if he uses the wall or use his motorized scooter for assistance at baseline, but states that he has not been able to walk or get out of bed since the onset of his sxs. Pt is barely able to lift his arms or legs, but he usually can at baseline. Symptoms are constant and moderate in severity. No mitigating or exacerbating factors reported. Associated sxs include fever (Tnow 100.5), chills, cough, and sore throat. Patient denies any body aches, n/v/d, loss of taste/smell, CP, SOB, headaches, and all other sxs at this time. No prior tx reported. Pt states that he used to take Ocrevus every 6 months to manage MS in the past, but discontinued use because it is too costly. Pt lives alone, however, he states that his mother is currently staying with him to help him.   No further complaints or concerns at this time.     Arrival mode: AASI    PCP: Primary Doctor No        Past Medical History:  Past Medical History:   Diagnosis Date    Multiple sclerosis     Transverse myelitis        Past Surgical History:  History reviewed. No pertinent " surgical history.      Family History:  Family History   Problem Relation Age of Onset    Cancer Mother     Stroke Father     Cancer Father     Glaucoma Father     Cancer Maternal Grandmother     Cancer Maternal Grandfather        Social History:  Social History     Tobacco Use    Smoking status: Never Smoker    Smokeless tobacco: Never Used   Substance and Sexual Activity    Alcohol use: No     Frequency: 2-4 times a month     Drinks per session: 3 or 4     Binge frequency: Never    Drug use: No    Sexual activity: Unknown        Review of Systems     Review of Systems   Constitutional: Positive for chills and fever.   HENT: Positive for sore throat.         (-) Loss of taste/smell   Respiratory: Positive for cough. Negative for shortness of breath.    Cardiovascular: Negative for chest pain.   Gastrointestinal: Negative for abdominal pain, diarrhea, nausea and vomiting.   Genitourinary: Negative for dysuria.   Musculoskeletal: Positive for gait problem (due to weakness). Negative for back pain and myalgias.   Skin: Negative for rash.   Neurological: Positive for weakness (extremity). Negative for dizziness, seizures, numbness and headaches.   Hematological: Does not bruise/bleed easily.   Psychiatric/Behavioral: Negative for confusion.   All other systems reviewed and are negative.     Physical Exam     Initial Vitals [09/24/20 1539]   BP Pulse Resp Temp SpO2   120/75 82 18 (!) 100.5 °F (38.1 °C) 97 %      MAP       --          Physical Exam  Nursing Notes and Vital Signs Reviewed.  Constitutional: Patient is in no acute distress. But looks very weak, warm to touch  Head: Atraumatic. Normocephalic.  Eyes: PERRL. EOM intact. Conjunctivae are not pale. No scleral icterus.  ENT: Mucous membranes are moist. Oropharynx is clear and symmetric.    Neck: Supple. Full ROM. No lymphadenopathy.  Cardiovascular: Regular rate. Regular rhythm. No murmurs, rubs, or gallops. Distal pulses are 2+ and  symmetric.  Pulmonary/Chest: No respiratory distress. Clear to auscultation bilaterally. No wheezing or rales.  Abdominal: Soft and non-distended.  There is no tenderness.  No rebound, guarding, or rigidity. Good bowel sounds.  Genitourinary: No CVA tenderness  Musculoskeletal: pt cannot barely lift arms off bed and he cannot lift his legs at all. No obvious deformities. No edema. Pt cannot walk.  Skin: Warm and dry.  Neurological:  Alert, awake, and appropriate.  Normal speech.  CN II-XII grossly intact  Psychiatric: Normal affect. Good eye contact. Appropriate in content.     ED Course   Procedures  ED Vital Signs:  Vitals:    09/24/20 1539 09/24/20 1603 09/24/20 1624 09/24/20 1705   BP: 120/75 136/69  112/70   Pulse: 82 82 75 75   Resp: 18 13     Temp: (!) 100.5 °F (38.1 °C)      TempSrc: Oral      SpO2: 97% 99%  98%   Weight:        09/24/20 1732 09/24/20 1900 09/24/20 1936 09/24/20 2000   BP:  123/72 (!) 106/58 115/72   Pulse:  69 (!) 59 (!) 57   Resp:  16 18 19   Temp:       TempSrc:       SpO2:  97% 96% 96%   Weight: 78 kg (171 lb 15.3 oz)       09/24/20 2030 09/24/20 2100 09/24/20 2130 09/24/20 2238   BP: 109/67 116/71 123/66 121/71   Pulse: (!) 55 (!) 55 64 (!) 56   Resp: 16 18 20 18   Temp:    97.5 °F (36.4 °C)   TempSrc:    Oral   SpO2: 95% 97% 95% 96%   Weight:        09/24/20 2335   BP: (!) 145/67   Pulse: 60   Resp: 18   Temp: 97 °F (36.1 °C)   TempSrc: Oral   SpO2: 95%   Weight:        Abnormal Lab Results:  Labs Reviewed   CBC W/ AUTO DIFFERENTIAL - Abnormal; Notable for the following components:       Result Value    Mean Corpuscular Hemoglobin 31.3 (*)     RDW 11.4 (*)     Gran # (ANC) 8.5 (*)     Mono # 1.3 (*)     Lymph% 12.5 (*)     All other components within normal limits   GROUP A STREP, MOLECULAR   GROUP A STREP, MOLECULAR   CULTURE, BLOOD   CULTURE, BLOOD   THROAT SCREEN, RAPID   SARS-COV-2 RNA AMPLIFICATION, QUAL   COMPREHENSIVE METABOLIC PANEL   LACTIC ACID, PLASMA   URINALYSIS, REFLEX TO  URINE CULTURE    Narrative:     Specimen Source->Urine   PROCALCITONIN   B-TYPE NATRIURETIC PEPTIDE   RESPIRATORY PATHOGENS PANEL (TEM-PCR)        All Lab Results:  Results for orders placed or performed during the hospital encounter of 09/24/20   Group A Strep, Molecular   Result Value Ref Range    Group A Strep, Molecular Negative Negative   COVID-19 Rapid Screening   Result Value Ref Range    SARS-CoV-2 RNA, Amplification, Qual Negative Negative   CBC auto differential   Result Value Ref Range    WBC 11.72 3.90 - 12.70 K/uL    RBC 4.99 4.60 - 6.20 M/uL    Hemoglobin 15.6 14.0 - 18.0 g/dL    Hematocrit 46.1 40.0 - 54.0 %    Mean Corpuscular Volume 92 82 - 98 fL    Mean Corpuscular Hemoglobin 31.3 (H) 27.0 - 31.0 pg    Mean Corpuscular Hemoglobin Conc 33.8 32.0 - 36.0 g/dL    RDW 11.4 (L) 11.5 - 14.5 %    Platelets 203 150 - 350 K/uL    MPV 10.9 9.2 - 12.9 fL    Immature Granulocytes 0.3 0.0 - 0.5 %    Gran # (ANC) 8.5 (H) 1.8 - 7.7 K/uL    Immature Grans (Abs) 0.04 0.00 - 0.04 K/uL    Lymph # 1.5 1.0 - 4.8 K/uL    Mono # 1.3 (H) 0.3 - 1.0 K/uL    Eos # 0.4 0.0 - 0.5 K/uL    Baso # 0.05 0.00 - 0.20 K/uL    nRBC 0 0 /100 WBC    Gran% 72.5 38.0 - 73.0 %    Lymph% 12.5 (L) 18.0 - 48.0 %    Mono% 11.1 4.0 - 15.0 %    Eosinophil% 3.2 0.0 - 8.0 %    Basophil% 0.4 0.0 - 1.9 %    Differential Method Automated    Comprehensive metabolic panel   Result Value Ref Range    Sodium 138 136 - 145 mmol/L    Potassium 3.7 3.5 - 5.1 mmol/L    Chloride 101 95 - 110 mmol/L    CO2 26 23 - 29 mmol/L    Glucose 99 70 - 110 mg/dL    BUN, Bld 9 6 - 20 mg/dL    Creatinine 1.3 0.5 - 1.4 mg/dL    Calcium 9.6 8.7 - 10.5 mg/dL    Total Protein 7.9 6.0 - 8.4 g/dL    Albumin 4.5 3.5 - 5.2 g/dL    Total Bilirubin 0.9 0.1 - 1.0 mg/dL    Alkaline Phosphatase 60 55 - 135 U/L    AST 16 10 - 40 U/L    ALT 22 10 - 44 U/L    Anion Gap 11 8 - 16 mmol/L    eGFR if African American >60 >60 mL/min/1.73 m^2    eGFR if non African American >60 >60 mL/min/1.73  m^2   Lactic acid, plasma #1   Result Value Ref Range    Lactate (Lactic Acid) 1.5 0.5 - 2.2 mmol/L   Urinalysis, Reflex to Urine Culture Urine, Clean Catch    Specimen: Urine   Result Value Ref Range    Specimen UA Urine, Clean Catch     Color, UA Yellow Yellow, Straw, Nelly    Appearance, UA Clear Clear    pH, UA 8.0 5.0 - 8.0    Specific Gravity, UA 1.020 1.005 - 1.030    Protein, UA Negative Negative    Glucose, UA Negative Negative    Ketones, UA Negative Negative    Bilirubin (UA) Negative Negative    Occult Blood UA Negative Negative    Nitrite, UA Negative Negative    Urobilinogen, UA Negative <2.0 EU/dL    Leukocytes, UA Negative Negative   Procalcitonin   Result Value Ref Range    Procalcitonin 0.04 <0.25 ng/mL   Brain natriuretic peptide   Result Value Ref Range    BNP 14 0 - 99 pg/mL         Imaging Results:  Imaging Results          X-Ray Chest AP Portable (Final result)  Result time 09/24/20 16:42:07    Final result by Chan Saenz MD (09/24/20 16:42:07)                 Impression:      No acute abnormality.      Electronically signed by: Chan Saenz  Date:    09/24/2020  Time:    16:42             Narrative:    EXAMINATION:  XR CHEST AP PORTABLE    CLINICAL HISTORY:  cough;    TECHNIQUE:  Single frontal view of the chest was performed.    COMPARISON:  None    FINDINGS:  The lungs are clear, with normal appearance of pulmonary vasculature and no pleural effusion or pneumothorax.    The cardiac silhouette is normal in size. The hilar and mediastinal contours are unremarkable.    Bones are intact.                                 The EKG was ordered, reviewed, and independently interpreted by the ED provider.  Interpretation time: 1629  Rate: 68 BPM  Rhythm: normal sinus rhythm  Interpretation: Nonspecific T wave abnormality. Abnormal ECG. No STEMI.           The Emergency Provider reviewed the vital signs and test results, which are outlined above.     ED Discussion     4:36 PM: I offered pt  admission to the hospital for further observation. Pt states that his mom is staying with him and is able to assist him with ADL. Pt cannot walk but he states that he has a mobility scooter to help him get around. Pt feels comfortable going home.    7:27 PM: I spoke with pt's sister in law. She expresses concerns for pt's way of living, stating that he has not utilized any resources and has difficulty conducting ADL. She requests assistance in arranging home health for pt. Will notify nurse to assist with finding resources.     7:56 PM: Discussed the case with Dr. Laws ([facility]) who reccommends consulting Dr. Rosado (neurology) to see if he is comfortable treating pt here or if pt needs to be transferred. Will contact Dr. Rosado.    7:58 PM: Dr. Rosado is fine with admitting the patient here. He recommends Brain with C/T Spine MRIs WWO in order to decide on high dose steroids for relapse vs supportive care for secondary progression, which seems more likely. After d/c, pt can f/u with his establish neurologist, Dr. Bella, in [place] to discuss further treatment options and plan. He further recommends pt receive an MRI tonight or tomorrow morning to guide treatment plan. Will notify Dr. Laws.    7:59 PM: Re-evaluated pt. I have discussed test results, shared treatment plan, and the need for admission with patient and family at bedside. Pt and family express understanding at this time and agree with all information. All questions answered. Pt and family have no further questions or concerns at this time. Pt is ready for admit.      8:00 PM: Discussed case with Dr. Laws ([facility]). Dr. Laws agrees with current care and management of pt and accepts admission.   Admitting Service: Hospital Medicine  Admitting Physician: Dr. Laws  Admit to: Obs/Med-Surg       Medical Decision Making:   Clinical Tests:   Lab Tests: Ordered and Reviewed  Radiological Study: Ordered and Reviewed  Medical Tests:  Ordered and Reviewed           ED Medication(s):  Medications   0.9%  NaCl infusion (has no administration in time range)   acetaminophen tablet 650 mg (has no administration in time range)   ondansetron injection 4 mg (has no administration in time range)   diphenhydrAMINE capsule 25 mg (has no administration in time range)   guaifenesin 100 mg/5 ml syrup 200 mg (has no administration in time range)   aluminum-magnesium hydroxide-simethicone 200-200-20 mg/5 mL suspension 30 mL (has no administration in time range)   albuterol-ipratropium 2.5 mg-0.5 mg/3 mL nebulizer solution 3 mL (has no administration in time range)   famotidine tablet 20 mg (20 mg Oral Given 9/24/20 2201)   acetaminophen tablet 1,000 mg (1,000 mg Oral Given 9/24/20 1726)   ibuprofen tablet 800 mg (800 mg Oral Given 9/24/20 1726)   sodium chloride 0.9% bolus 30 mL/kg (0 mL/kg Intravenous Stopped 9/24/20 1847)   dexamethasone injection 4 mg (4 mg Intravenous Given 9/24/20 1937)       Current Discharge Medication List      START taking these medications    Details   methylPREDNISolone (MEDROL DOSEPACK) 4 mg tablet Take as directed  Qty: 1 Package, Refills: 0             Follow-up Information     Please follow up.    Contact information:  Follow-up with Dr. Bella your Neurologist in 1-2 days for recheck                     Scribe Attestation:   Scribe #1: I performed the above scribed service and the documentation accurately describes the services I performed. I attest to the accuracy of the note.     Attending:   Physician Attestation Statement for Scribe #1: I, Clay Temple Do, MD, personally performed the services described in this documentation, as scribed by Lea Pride, in my presence, and it is both accurate and complete.           Clinical Impression       ICD-10-CM ICD-9-CM   1. Viral syndrome  B34.9 079.99   2. Weakness  R53.1 780.79   3. Multiple sclerosis  G35 340   4. Multiple sclerosis exacerbation  G35 340       Disposition:    Disposition: Placed in Observation  Condition: Fair         Clay Temple Do, MD  09/25/20 0037

## 2020-09-25 NOTE — CONSULTS
"Subjective:       Patient ID: Larry Hinds is a 40 y.o. male.    Chief Complaint: Sore Throat (sore throat, chills.  Hx of MS - pt states he feels more "paralyzed than normal")          HPI     The patient was admitted through the ED on 09- for functional decline. Neurological history is remarkable for SPMS off DMA for 4 months (Last DMA-[facility]). Patient currently lives alone at home, helps himself with a motorized scooter.  However 2 days PTA he started complaining of extreme fatigue, generalized weakness, fever, chills and was unable to ambulate on his own at home, unable to move his bilateral lower extremities.  He fell off his motorized scooter, could not get off the floor for many hours.  Since admission, he has improved spontaneously but quite at baseline yet. I ordered and reviewed CNS MRIs which show no evidence of new enhancing lesions. Sepsis DENNISON-ve and COVID -ve.     Review of Systems   Constitutional: Positive for activity change and fatigue. Negative for appetite change.   HENT: Negative for hearing loss and tinnitus.    Eyes: Negative for photophobia and visual disturbance.   Respiratory: Positive for cough. Negative for apnea and shortness of breath.    Cardiovascular: Negative for chest pain and palpitations.   Gastrointestinal: Negative for nausea and vomiting.   Endocrine: Negative for cold intolerance and heat intolerance.   Genitourinary: Negative for difficulty urinating and urgency.   Musculoskeletal: Positive for gait problem and myalgias. Negative for arthralgias, back pain, joint swelling, neck pain and neck stiffness.   Skin: Negative for color change and rash.   Allergic/Immunologic: Negative for environmental allergies and immunocompromised state.   Neurological: Positive for weakness and numbness. Negative for dizziness, tremors, seizures, syncope, facial asymmetry, speech difficulty, light-headedness and headaches.   Hematological: Negative for adenopathy. Does not bruise/bleed " easily.   Psychiatric/Behavioral: Positive for dysphoric mood. Negative for agitation, behavioral problems, confusion, decreased concentration, hallucinations, self-injury, sleep disturbance and suicidal ideas. The patient is not hyperactive.                  Current Facility-Administered Medications:     0.9%  NaCl infusion, , Intravenous, Continuous, Kenrick Laws MD, Last Rate: 100 mL/hr at 09/24/20 2325    acetaminophen tablet 650 mg, 650 mg, Oral, Q6H PRN, Kenrick Laws MD    albuterol-ipratropium 2.5 mg-0.5 mg/3 mL nebulizer solution 3 mL, 3 mL, Nebulization, Q4H PRN, Kenrick Laws MD    aluminum-magnesium hydroxide-simethicone 200-200-20 mg/5 mL suspension 30 mL, 30 mL, Oral, Q6H PRN, Kenrick Laws MD    diphenhydrAMINE capsule 25 mg, 25 mg, Oral, Q6H PRN, Kenrick Laws MD    famotidine tablet 20 mg, 20 mg, Oral, BID, Kenrick Laws MD, 20 mg at 09/25/20 0806    guaifenesin 100 mg/5 ml syrup 200 mg, 200 mg, Oral, Q4H PRN, Kenrick Laws MD    ondansetron injection 4 mg, 4 mg, Intravenous, Q8H PRN, Kenrick Laws MD  Past Medical History:   Diagnosis Date    Multiple sclerosis     Transverse myelitis      History reviewed. No pertinent surgical history.  Social History     Socioeconomic History    Marital status: Single     Spouse name: Not on file    Number of children: Not on file    Years of education: Not on file    Highest education level: Not on file   Occupational History    Not on file   Social Needs    Financial resource strain: Not hard at all    Food insecurity     Worry: Never true     Inability: Never true    Transportation needs     Medical: No     Non-medical: Yes   Tobacco Use    Smoking status: Never Smoker    Smokeless tobacco: Never Used   Substance and Sexual Activity    Alcohol use: No     Frequency: 2-4 times a month     Drinks per session: 3 or 4     Binge frequency: Never    Drug use: No    Sexual activity: Not on file   Lifestyle    Physical activity     Days per  week: 2 days     Minutes per session: 30 min    Stress: Not on file   Relationships    Social connections     Talks on phone: Twice a week     Gets together: Once a week     Attends [employer] service: Not on file     Active member of club or organization: No     Attends meetings of clubs or organizations: Never     Relationship status: Never    Other Topics Concern    Not on file   Social History Narrative    Not on file             Past/Current Medical/Surgical History, Past/Current Social History, Past/Current Family History and Past/Current Medications were reviewed in detail.        Objective:           VITAL SIGNS WERE REVIEWED      GENERAL APPEARANCE:     The patient looks uncomfortable.    Body habitus is normal.     No signs of respiratory distress.    Normal breathing pattern.    No dysmorphic features    Normal eye contact.     GENERAL MEDICAL EXAM:    HEENT:  Head is atraumatic normocephalic.     No tender temporal arteries. Fundoscopic (Ophthalmoscopic) exam showed no disc edema.      Neck and Axillae: No JVD. No visible lesions.    No carotid bruits. No thyromegaly. No lymphadenopathy.    Cardiopulmonary: No cyanosis. No tachypnea. Normal respiratory effort.    Clear breath sounds. S1, S2 with regular rhythm . No murmurs.     Gastrointestinal/Urogenital:  No jaundice. No stomas or lesions. No visible hernias. No catheters.     Abdomen is soft non-tender. No masses or organomegaly.    Skin, Hair and Nails: No pathognonomic skin rash. No neurofibromatosis. No visible lesions.No stigmata of autoimmune disease. No clubbing.    Skin is warm and moist. No palpable masses.    Limbs: No varicose veins. No visible swelling.    No palpable edema. Pulses are symmetric. Pedal pulses are palpable.      Muskoskeletal: No visible deformities.No visible lesions.    No spine tenderness. No signs of longstanding neuropathy. No dislocations or fractures.            Neurologic Exam     Mental Status   Oriented  to person, place, and time.   Registration: recalls 3 of 3 objects. Recall at 5 minutes: recalls 3 of 3 objects. Follows 3 step commands.   Attention: normal. Concentration: normal.   Speech: speech is normal   Level of consciousness: alert  Knowledge: good and consistent with education. Able to perform simple calculations.   Able to name object. Able to read. Able to repeat. Able to write. Normal comprehension.     Cranial Nerves   Cranial nerves II through XII intact.     CN II   Visual fields full to confrontation.   Visual acuity: normal  Right visual field deficit: none  Left visual field deficit: none     CN III, IV, VI   Pupils are equal, round, and reactive to light.  Extraocular motions are normal.   Right pupil: Size: 2 mm. Shape: regular. Reactivity: brisk. Consensual response: intact. Accommodation: intact.   Left pupil: Size: 2 mm. Shape: regular. Reactivity: brisk. Consensual response: intact. Accommodation: intact.   CN III: no CN III palsy  CN VI: no CN VI palsy  Nystagmus: none   Diplopia: none  Ophthalmoparesis: none  Upgaze: normal  Downgaze: normal  Conjugate gaze: present  Vestibulo-ocular reflex: present    CN V   Facial sensation intact.   Right facial sensation deficit: none  Left facial sensation deficit: none    CN VII   Facial expression full, symmetric.   Right facial weakness: none  Left facial weakness: none    CN VIII   CN VIII normal.   Hearing: intact  Right Rinne: AC > BC  Left Rinne: AC > BC  Miller: does not lateralize     CN IX, X   CN IX normal.   CN X normal.   Palate: symmetric    CN XI   CN XI normal.   Right sternocleidomastoid strength: normal  Left sternocleidomastoid strength: normal  Right trapezius strength: normal  Left trapezius strength: normal    CN XII   CN XII normal.   Tongue: not atrophic  Fasciculations: absent  Tongue deviation: none    Motor Exam   Muscle bulk: decreased  Right arm tone: increased  Left arm tone: increased  Right arm pronator drift:  absent  Left arm pronator drift: absent  Right leg tone: spastic  Left leg tone: spastic    Strength   Right neck flexion: 5/5  Left neck flexion: 5/5  Right neck extension: 5/5  Left neck extension: 5/5  Right deltoid: 4/5  Left deltoid: 4/5  Right biceps: 4/5  Left biceps: 4/5  Right triceps: 4/5  Left triceps: 4/5  Right wrist flexion: 4/5  Left wrist flexion: 4/5  Right wrist extension: 4/5  Left wrist extension: 4/5  Right interossei: 3/5  Left interossei: 3/5  Right iliopsoas: 3/5  Left iliopsoas: 3/5  Right quadriceps: 3/5  Left quadriceps: 3/5  Right hamstring: 3/5  Left hamstring: 3/5  Right glutei: 3/5  Left glutei: 3/5  Right anterior tibial: 3/5  Left anterior tibial: 3/5  Right posterior tibial: 3/5  Left posterior tibial: 3/5  Right peroneal: 3/5  Left peroneal: 3/5  Right gastroc: 3/5  Left gastroc: 3/5    Sensory Exam   Right arm light touch: decreased from elbow  Left arm light touch: decreased from elbow  Right leg light touch: decreased from knee  Left leg light touch: decreased from knee  Right arm vibration: decreased from wrist  Left arm vibration: decreased from wrist  Right leg vibration: decreased from knee  Left leg vibration: decreased from knee  Right arm proprioception: decreased from wrist  Left arm proprioception: decreased from wrist  Right leg proprioception: decreased from knee  Left leg proprioception: decreased from knee  Right arm pinprick: decreased from elbow  Left arm pinprick: decreased from elbow  Right leg pinprick: decreased from knee  Left leg pinprick: decreased from knee  Graphesthesia: normal  Stereognosis: normal    Gait, Coordination, and Reflexes     Gait  Gait: (Deferred)    Coordination   Finger to nose coordination: abnormal  Heel to shin coordination: abnormal    Tremor   Resting tremor: absent  Intention tremor: absent  Action tremor: absent    Reflexes   Right brachioradialis: 2+  Left brachioradialis: 2+  Right biceps: 2+  Left biceps: 2+  Right triceps:  2+  Left triceps: 2+  Right patellar: 3+  Left patellar: 3+  Right achilles: 3+  Left achilles: 3+  Right : 3+  Left : 3+  Right plantar: upgoing  Left plantar: upgoing  Right Frederick: absent  Left Frederick: absent  Right ankle clonus: absent  Left ankle clonus: absent  Right pendular knee jerk: absent  Left pendular knee jerk: absent      Lab Results   Component Value Date    WBC 8.48 09/25/2020    HGB 13.5 (L) 09/25/2020    HCT 40.4 09/25/2020    MCV 93 09/25/2020     09/25/2020     Sodium   Date Value Ref Range Status   09/25/2020 139 136 - 145 mmol/L Final     Potassium   Date Value Ref Range Status   09/25/2020 4.7 3.5 - 5.1 mmol/L Final     Chloride   Date Value Ref Range Status   09/25/2020 108 95 - 110 mmol/L Final     CO2   Date Value Ref Range Status   09/25/2020 25 23 - 29 mmol/L Final     Glucose   Date Value Ref Range Status   09/25/2020 159 (H) 70 - 110 mg/dL Final     BUN, Bld   Date Value Ref Range Status   09/25/2020 11 6 - 20 mg/dL Final     Creatinine   Date Value Ref Range Status   09/25/2020 1.0 0.5 - 1.4 mg/dL Final     Calcium   Date Value Ref Range Status   09/25/2020 8.9 8.7 - 10.5 mg/dL Final     Total Protein   Date Value Ref Range Status   09/24/2020 7.9 6.0 - 8.4 g/dL Final     Albumin   Date Value Ref Range Status   09/24/2020 4.5 3.5 - 5.2 g/dL Final     Total Bilirubin   Date Value Ref Range Status   09/24/2020 0.9 0.1 - 1.0 mg/dL Final     Comment:     For infants and newborns, interpretation of results should be based  on gestational age, weight and in agreement with clinical  observations.  Premature Infant recommended reference ranges:  Up to 24 hours.............<8.0 mg/dL  Up to 48 hours............<12.0 mg/dL  3-5 days..................<15.0 mg/dL  6-29 days.................<15.0 mg/dL       Alkaline Phosphatase   Date Value Ref Range Status   09/24/2020 60 55 - 135 U/L Final     AST   Date Value Ref Range Status   09/24/2020 16 10 - 40 U/L Final     ALT   Date  Value Ref Range Status   09/24/2020 22 10 - 44 U/L Final     Anion Gap   Date Value Ref Range Status   09/25/2020 6 (L) 8 - 16 mmol/L Final     eGFR if    Date Value Ref Range Status   09/25/2020 >60 >60 mL/min/1.73 m^2 Final     eGFR if non    Date Value Ref Range Status   09/25/2020 >60 >60 mL/min/1.73 m^2 Final     Comment:     Calculation used to obtain the estimated glomerular filtration  rate (eGFR) is the CKD-EPI equation.        09-24/    Sepsis DENNISON -ve    COVID-ve    CNS MRIs No new active lesions     Reviewed the neuroimaging independently       Assessment:       1. Viral syndrome    2. Weakness    3. Multiple sclerosis    4. Multiple sclerosis exacerbation        Plan:             SPMS WITH PSEUDO EXACERBATION 2/2 VIRAL SYNDROME    No steroids.    Medical supportive care and rehabilitation.     F/U with [facility] upon discharge for DMA options.                  MEDICAL/SURGICAL COMORBIDITIES     All relevant medical comorbidities noted and managed by primary care physician and medical care team.          MISCELLANEOUS MEDICAL PROBLEMS       HEALTHY LIFESTYLE AND PREVENTATIVE CARE    Encouraged the patient to adhere to the age-appropriate health maintenance guidelines including screening tests and vaccinations.     Discussed the overall importance of healthy lifestyle, optimal weight, exercise, healthy diet, good sleep hygiene and avoiding drugs including smoking, alcohol and recreational drugs. The patient verbalized full understanding.       Advised the patient to follow COVID-19 prevention measures.           I spent 70  minutes on the hospital unit and at the bedside rendering services for the patient including reviewing the chart, interviewing the patient, examining the patient, writing my notes and communicating my findings/recommendations with the patient/patient's family and primary team.              Cruz Rosado MD, FAAN    Attending Neurologist/Epileptologist          Diplomate, American Board of Psychiatry and Neurology    Diplomate, American Board of Clinical Neurophysiology     Fellow, American Academy of Neurology

## 2020-09-25 NOTE — ASSESSMENT & PLAN NOTE
Patient states that he has been unable to ambulate for the past two days at home.  Uses a motorized scooter at baseline.  PT/OT to evaluate and treat.

## 2020-09-25 NOTE — HPI
"Mr. Hinds is a 40-year-old  male with PMH significant for multiple sclerosis diagnosed in 2014, followed by Dr. glaser 10 on the all lids, but has not seen him in many months, and has not been on any medications for the past many months.  Patient currently lives alone at home, helps himself with a motorized scooter.  However two days ago he started complaining of extreme fatigue, generalized weakness, associated with fever, chills.  He was unable to ambulate on his own at home, unable to move his bilateral lower extremities.  He fell off his motorized scooter, could not get off the floor for many hours yesterday.  Denies hitting his head.  He reports symptoms of fever, chills, generalized weakness, congestion, flu-like symptoms".  Bilateral lower extremity weakness, unable to move his legs.  In the ED there was concern for exacerbation of MS.  Dr. Rosado with Neurology was consulted, recommended MRI brain, cervical and thoracic spine.  Patient received dexamethasone 4 mg IV x1 in the ED.  Laboratory workup is otherwise unremarkable.  COVID-19 negative.    Admitting diagnosis:  Bilateral lower extremity weakness, concern for possible MS exacerbation versus flu-like symptoms.  "

## 2020-09-25 NOTE — PT/OT/SLP EVAL
Physical Therapy Evaluation    Patient Name:  Larry Hinds   MRN:  [id number]    Recommendations:     Discharge Recommendations:  rehabilitation facility   Discharge Equipment Recommendations: walker, rolling, shower chair   Barriers to discharge: Decreased caregiver support    Assessment:     Larry Hinds is a 40 y.o. male admitted with a medical diagnosis of Weakness of both lower extremities.  He presents with the following impairments/functional limitations:  weakness, impaired endurance, abnormal tone, decreased lower extremity function, decreased upper extremity function, impaired self care skills, impaired functional mobilty, gait instability, impaired balance, pain, decreased safety awareness .    Rehab Prognosis: Good; patient would benefit from acute skilled PT services to address these deficits and reach maximum level of function.    Recent Surgery: * No surgery found *      Plan:     During this hospitalization, patient to be seen   to address the identified rehab impairments via gait training, therapeutic activities, therapeutic exercises and progress toward the following goals:    · Plan of Care Expires:  10/02/20    Subjective     Chief Complaint: OVERALL ACHE/ WEAKNESS  Patient/Family Comments/goals: INC MOBILITY   Pain/Comfort:  · Pain Rating 1: 2/10  · Location 1: (BODY ACHE)  · Pain Rating Post-Intervention 1: 2/10    Patients cultural, spiritual, Congregational conflicts given the current situation:      Living Environment:  PT LIVES AT HOME ALONE AND HAS NO STEPS TO ENTER APT.   Prior to admission, patients level of function was MOD I WITH SCOOTER AND FURNITURE AMBULATOR.  Equipment used at home: none.  DME owned (not currently used): none.  Upon discharge, patient will have assistance from UNKNOWN.    Objective:     Communicated with NURSE GARRETT AND Epic CHART REVIEW  prior to session.  Patient found supine with peripheral IV  upon PT entry to room.    General Precautions: Standard, fall    Orthopedic Precautions:N/A   Braces: N/A     Exams:  · RLE ROM: LIMITED  · RLE Strength: 2+/5  · LLE ROM: LIMITED  · LLE Strength: 2+/5    Functional Mobility:  PT MET IN RM SUP.SIT EOB WITH SBA. PT SCOOTED TO EOB AND BALANCE/ STRENGTH ASSESSED. PT STOOD WITH RW AND MIN A. PT T/F TO CHAIR WITH RW AND MIN A. PT SEATED IN CHAIR AND EDUCATED ON ROLE OF P.T. AND TO CALL FOR ASSIST NOT TO FALL. PT REPORTED UNDERSTANDING.     AM-PAC 6 CLICK MOBILITY  Total Score:16     Patient left up in chair with call button in reach and chair alarm on.    GOALS:   Multidisciplinary Problems     Physical Therapy Goals        Problem: Physical Therapy Goal    Goal Priority Disciplines Outcome Goal Variances Interventions   Physical Therapy Goal     PT, PT/OT      Description: PT WILL BE SEEN FOR P.T. FOR A MIN OF 5 OUT OF 7 DAYS A WEEK  LTG: 10/2/20  1. PT WILL T/F TO CHAIR WITH RW AND CGA>SBA  2. PT WILL GT TRAIN X 20' WITH RW AND MIN A  3. PT WILL COMPLETE TE X 10 REPS FOR STRENGTHENING  4. PT WILL COMPLETE BED MOBILITY IND                     History:     Past Medical History:   Diagnosis Date    Multiple sclerosis     Transverse myelitis        History reviewed. No pertinent surgical history.    Time Tracking:     PT Received On: 09/25/20  PT Start Time: 1130     PT Stop Time: 1155  PT Total Time (min): 25 min     Billable Minutes: Evaluation 15 and Therapeutic Activity 10      Angela Swenson, PT  09/25/2020

## 2020-09-25 NOTE — SUBJECTIVE & OBJECTIVE
Past Medical History:   Diagnosis Date    Multiple sclerosis     Transverse myelitis        History reviewed. No pertinent surgical history.    Review of patient's allergies indicates:  No Known Allergies    No current facility-administered medications on file prior to encounter.      Current Outpatient Medications on File Prior to Encounter   Medication Sig    ascorbic acid, vitamin C, (VITAMIN C) 1000 MG tablet Take 1,000 mg by mouth.    b complex vitamins tablet Take 1 tablet by mouth once daily.    BIOTIN ORAL Take by mouth.    cholecalciferol, vitamin D3, 10,000 unit Tab Take by mouth.     coenzyme Q10 (CO Q-10) 10 mg capsule Take 10 mg by mouth once daily.    cyanocobalamin (VITAMIN B-12) 1000 MCG tablet Take 1,000 mcg by mouth.    multivitamin (ONE DAILY MULTIVITAMIN) per tablet Take 1 tablet by mouth.    OLIVE LEAF EXTRACT ORAL Take by mouth.    omega-3 acid ethyl esters (LOVAZA) 1 gram capsule Take 1 g by mouth.    TURMERIC ORAL Take 1 tablet by mouth once daily.    UNABLE TO FIND Sunflower Lecithin    UNABLE TO FIND Fulvic Mineral    UNABLE TO FIND Black seed oil    UNABLE TO FIND Sphingolin    UNABLE TO FIND Amyloban     Family History     Problem Relation (Age of Onset)    Cancer Mother, Father, Maternal Grandmother, Maternal Grandfather    Glaucoma Father    Stroke Father        Tobacco Use    Smoking status: Never Smoker    Smokeless tobacco: Never Used   Substance and Sexual Activity    Alcohol use: No     Frequency: 2-4 times a month     Drinks per session: 3 or 4     Binge frequency: Never    Drug use: No    Sexual activity: Not on file     Review of Systems   Constitutional: Positive for appetite change, fatigue and fever.   HENT: Negative.  Negative for congestion, nosebleeds and sore throat.    Eyes: Negative.  Negative for photophobia, redness and visual disturbance.   Respiratory: Positive for cough (Dry nonproductive). Negative for shortness of breath and wheezing.     Cardiovascular: Negative.  Negative for chest pain, palpitations and leg swelling.   Gastrointestinal: Negative.  Negative for abdominal pain, constipation, diarrhea, nausea and vomiting.   Endocrine: Negative.  Negative for polyuria.   Genitourinary: Negative.  Negative for dysuria, flank pain, frequency and urgency.   Musculoskeletal: Negative.  Negative for arthralgias, back pain and joint swelling.   Skin: Negative.  Negative for color change, pallor and rash.   Allergic/Immunologic: Negative.  Negative for immunocompromised state.   Neurological: Positive for weakness (Generalized.  Unable to move his bilateral lower extremities were the past two days, slowly improving.). Negative for dizziness, syncope, light-headedness, numbness and headaches.   Hematological: Negative.    Psychiatric/Behavioral: Negative.  Negative for confusion and hallucinations. The patient is not nervous/anxious.    All other systems reviewed and are negative.    Objective:     Vital Signs (Most Recent):  Temp: (!) 100.5 °F (38.1 °C) (09/24/20 1539)  Pulse: (!) 55 (09/24/20 2030)  Resp: 16 (09/24/20 2030)  BP: 109/67 (09/24/20 2030)  SpO2: 95 % (09/24/20 2030) Vital Signs (24h Range):  Temp:  [100.5 °F (38.1 °C)] 100.5 °F (38.1 °C)  Pulse:  [55-82] 55  Resp:  [13-19] 16  SpO2:  [95 %-99 %] 95 %  BP: (106-136)/(58-75) 109/67     Weight: 78 kg (171 lb 15.3 oz)  Body mass index is 22.69 kg/m².    Physical Exam  Vitals signs and nursing note reviewed.   Constitutional:       General: He is not in acute distress.     Appearance: Normal appearance. He is well-developed. He is not diaphoretic.   HENT:      Head: Normocephalic and atraumatic.      Mouth/Throat:      Mouth: Mucous membranes are moist.   Eyes:      General: No scleral icterus.     Conjunctiva/sclera: Conjunctivae normal.      Pupils: Pupils are equal, round, and reactive to light.   Neck:      Musculoskeletal: Normal range of motion and neck supple.      Thyroid: No thyromegaly.    Cardiovascular:      Rate and Rhythm: Normal rate and regular rhythm.      Heart sounds: Normal heart sounds. No murmur.   Pulmonary:      Effort: Pulmonary effort is normal. No respiratory distress.      Breath sounds: Normal breath sounds. No wheezing.   Chest:      Chest wall: No tenderness.   Abdominal:      General: Bowel sounds are normal.      Palpations: Abdomen is soft.      Tenderness: There is no abdominal tenderness.   Musculoskeletal: Normal range of motion.         General: No swelling, tenderness or deformity.   Lymphadenopathy:      Cervical: No cervical adenopathy.   Skin:     General: Skin is warm and dry.   Neurological:      Mental Status: He is alert and oriented to person, place, and time.      Cranial Nerves: No cranial nerve deficit.      Motor: Weakness (Motor strength bilateral lower extremities decreased.  Unable to lift them off the bed.  Bilateral upper extremities motor strength and sensation intact.) present. No abnormal muscle tone.      Coordination: Coordination normal.   Psychiatric:         Mood and Affect: Mood normal.         Behavior: Behavior normal.         Thought Content: Thought content normal.           CRANIAL NERVES     CN III, IV, VI   Pupils are equal, round, and reactive to light.       Significant Labs:   Results for orders placed or performed during the hospital encounter of 09/24/20   Group A Strep, Molecular   Result Value Ref Range    Group A Strep, Molecular Negative Negative   COVID-19 Rapid Screening   Result Value Ref Range    SARS-CoV-2 RNA, Amplification, Qual Negative Negative   CBC auto differential   Result Value Ref Range    WBC 11.72 3.90 - 12.70 K/uL    RBC 4.99 4.60 - 6.20 M/uL    Hemoglobin 15.6 14.0 - 18.0 g/dL    Hematocrit 46.1 40.0 - 54.0 %    Mean Corpuscular Volume 92 82 - 98 fL    Mean Corpuscular Hemoglobin 31.3 (H) 27.0 - 31.0 pg    Mean Corpuscular Hemoglobin Conc 33.8 32.0 - 36.0 g/dL    RDW 11.4 (L) 11.5 - 14.5 %    Platelets 203 150  - 350 K/uL    MPV 10.9 9.2 - 12.9 fL    Immature Granulocytes 0.3 0.0 - 0.5 %    Gran # (ANC) 8.5 (H) 1.8 - 7.7 K/uL    Immature Grans (Abs) 0.04 0.00 - 0.04 K/uL    Lymph # 1.5 1.0 - 4.8 K/uL    Mono # 1.3 (H) 0.3 - 1.0 K/uL    Eos # 0.4 0.0 - 0.5 K/uL    Baso # 0.05 0.00 - 0.20 K/uL    nRBC 0 0 /100 WBC    Gran% 72.5 38.0 - 73.0 %    Lymph% 12.5 (L) 18.0 - 48.0 %    Mono% 11.1 4.0 - 15.0 %    Eosinophil% 3.2 0.0 - 8.0 %    Basophil% 0.4 0.0 - 1.9 %    Differential Method Automated    Comprehensive metabolic panel   Result Value Ref Range    Sodium 138 136 - 145 mmol/L    Potassium 3.7 3.5 - 5.1 mmol/L    Chloride 101 95 - 110 mmol/L    CO2 26 23 - 29 mmol/L    Glucose 99 70 - 110 mg/dL    BUN, Bld 9 6 - 20 mg/dL    Creatinine 1.3 0.5 - 1.4 mg/dL    Calcium 9.6 8.7 - 10.5 mg/dL    Total Protein 7.9 6.0 - 8.4 g/dL    Albumin 4.5 3.5 - 5.2 g/dL    Total Bilirubin 0.9 0.1 - 1.0 mg/dL    Alkaline Phosphatase 60 55 - 135 U/L    AST 16 10 - 40 U/L    ALT 22 10 - 44 U/L    Anion Gap 11 8 - 16 mmol/L    eGFR if African American >60 >60 mL/min/1.73 m^2    eGFR if non African American >60 >60 mL/min/1.73 m^2   Lactic acid, plasma #1   Result Value Ref Range    Lactate (Lactic Acid) 1.5 0.5 - 2.2 mmol/L   Urinalysis, Reflex to Urine Culture Urine, Clean Catch    Specimen: Urine   Result Value Ref Range    Specimen UA Urine, Clean Catch     Color, UA Yellow Yellow, Straw, Nelly    Appearance, UA Clear Clear    pH, UA 8.0 5.0 - 8.0    Specific Gravity, UA 1.020 1.005 - 1.030    Protein, UA Negative Negative    Glucose, UA Negative Negative    Ketones, UA Negative Negative    Bilirubin (UA) Negative Negative    Occult Blood UA Negative Negative    Nitrite, UA Negative Negative    Urobilinogen, UA Negative <2.0 EU/dL    Leukocytes, UA Negative Negative   Procalcitonin   Result Value Ref Range    Procalcitonin 0.04 <0.25 ng/mL   Brain natriuretic peptide   Result Value Ref Range    BNP 14 0 - 99 pg/mL         Significant Imaging:  I have reviewed and interpreted all pertinent imaging results/findings within the past 24 hours.     Imaging Results          X-Ray Chest AP Portable (Final result)  Result time 09/24/20 16:42:07    Final result by Chan Saenz MD (09/24/20 16:42:07)                 Impression:      No acute abnormality.      Electronically signed by: Chan Saenz  Date:    09/24/2020  Time:    16:42             Narrative:    EXAMINATION:  XR CHEST AP PORTABLE    CLINICAL HISTORY:  cough;    TECHNIQUE:  Single frontal view of the chest was performed.    COMPARISON:  None    FINDINGS:  The lungs are clear, with normal appearance of pulmonary vasculature and no pleural effusion or pneumothorax.    The cardiac silhouette is normal in size. The hilar and mediastinal contours are unremarkable.    Bones are intact.                                I have independently reviewed all pertinent labs within the past 24 hours.    I have independently reviewed, visualized and interpreted all pertinent imaging results within the past 24 hours and discussed the findings with the ED physician, Dr. Gruber

## 2020-09-26 VITALS
BODY MASS INDEX: 22.9 KG/M2 | SYSTOLIC BLOOD PRESSURE: 110 MMHG | TEMPERATURE: 98 F | DIASTOLIC BLOOD PRESSURE: 58 MMHG | HEART RATE: 50 BPM | HEIGHT: 73 IN | RESPIRATION RATE: 18 BRPM | OXYGEN SATURATION: 99 % | WEIGHT: 172.81 LBS

## 2020-09-26 PROCEDURE — 97110 THERAPEUTIC EXERCISES: CPT | Mod: CQ

## 2020-09-26 PROCEDURE — 97116 GAIT TRAINING THERAPY: CPT | Mod: CQ

## 2020-09-26 PROCEDURE — 25000003 PHARM REV CODE 250: Performed by: INTERNAL MEDICINE

## 2020-09-26 RX ORDER — GUAIFENESIN 100 MG/5ML
200 SOLUTION ORAL 4 TIMES DAILY PRN
Qty: 400 ML | Refills: 0
Start: 2020-09-26 | End: 2020-10-06

## 2020-09-26 RX ADMIN — FAMOTIDINE 20 MG: 20 TABLET ORAL at 08:09

## 2020-09-26 RX ADMIN — GUAIFENESIN 200 MG: 200 SOLUTION ORAL at 02:09

## 2020-09-26 NOTE — PLAN OF CARE
Problem: Adult Inpatient Plan of Care  Goal: Plan of Care Review  Outcome: Ongoing, Progressing  Flowsheets (Taken 9/26/2020 0326)  Plan of Care Reviewed With: patient  Patient had no adverse events during shift. Patient free of falls. Call light in reach. Side Rails x2. Pain well controlled with ordered medications. Patient repositions independently, bedrest. IVF administered as ordered, D/C at midnight. VSS. Chart reviewed. Will Continue to monitor.

## 2020-09-26 NOTE — PLAN OF CARE
09/26/20 1542   Final Note   Assessment Type Final Discharge Note   Anticipated Discharge Disposition Home   Right Care Referral Info   Post Acute Recommendation No Care

## 2020-09-26 NOTE — NURSING
Pt's discharge instructions given and explained. Verbalized understanding. IV discontinued. Belongings packed at bedside. Will transport out

## 2020-09-26 NOTE — PLAN OF CARE
Pt required MIN A for all bed mobility and transfers. Ambulated with the RW with MIN to Moderate assist 30 feet x 2. Pt nearly fell due to fatigue but luckily he was close to the bed and manage to fall into it and not on the floor. Poor safety and poor awareness of his limitations.

## 2020-09-26 NOTE — DISCHARGE SUMMARY
"[facility] Hospital Medicine  Discharge Summary      Patient Name: Larry Hinds  MRN: [id number]  Admission Date: 9/24/2020  Hospital Length of Stay: 1 days  Discharge Date and Time: 9/26/2020  3:54 PM  Attending Physician: Dr. Donaldo Barboza    Discharging Provider: Alisha Watts NP  Primary Care Provider: Primary Doctor No      HPI:   Mr. Hinds is a 40-year-old  male with PMH significant for multiple sclerosis diagnosed in 2014, followed by Dr. glaser 10 on the all lids, but has not seen him in many months, and has not been on any medications for the past many months.  Patient currently lives alone at home, helps himself with a motorized scooter.  However two days ago he started complaining of extreme fatigue, generalized weakness, associated with fever, chills.  He was unable to ambulate on his own at home, unable to move his bilateral lower extremities.  He fell off his motorized scooter, could not get off the floor for many hours yesterday.  Denies hitting his head.  He reports symptoms of fever, chills, generalized weakness, congestion, flu-like symptoms".  Bilateral lower extremity weakness, unable to move his legs.  In the ED there was concern for exacerbation of MS.  Dr. Rosado with Neurology was consulted, recommended MRI brain, cervical and thoracic spine.  Patient received dexamethasone 4 mg IV x1 in the ED.  Laboratory workup is otherwise unremarkable.  COVID-19 negative.    Admitting diagnosis:  Bilateral lower extremity weakness, concern for possible MS exacerbation versus flu-like symptoms.    * No surgery found *      Hospital Course:   Pt admitted to Medical Surgical Unit for weakness to bilateral lower extremities in the setting of Multiple Sclerosis. Neurology consulted.  Steroids initiated.  Pt verbalized symptom improvement with increased mobility and strength noted to BLE upon exam.  Pt also reports "flu-like" symptoms with COVID 19 negative.  Chest xray negative.  " Influenza tests pending.   consulted to assist with discharge planning and placement if needed.  Pt states he is followed outpatient by Dr. Hawkins and has not been on any medication for approximately 4 months.  Bradycardia noted on monitor. On 9/26/20, pt continued to verbalize symptom improvement with increased strength and mobility noted upon exam.  MRI results negative for any new lesions.  Case discussed with Neurology.  PT/OT evaluation completed with Inpatient Rehab recommended.  Pt refused placement to Rehab facility at this time.  Home health not available to patient however he states that he has gym equipment at home and assistance from his brother to perform exercises at home.  Ambulatory referral to Palliative Care placed.  Pt seen and examined and deemed suitable for discharge to home.  Current medications resumed with Robitussin prescribed.  Pt instructed to follow up with PCP and Neurology (established Provider) upon discharge for further evaluation.      Consults:   Consults (From admission, onward)        Status Ordering Provider     Inpatient consult to Neurology  Once     Provider:  Cruz Rosado MD    Completed JULIEN COREY     Inpatient consult to Social Work  Once     Provider:  (Not yet assigned)    Completed JULIEN COREY          Final Active Diagnoses:    Diagnosis Date Noted POA    PRINCIPAL PROBLEM:  Weakness of both lower extremities [R29.898] 09/24/2020 Yes    Viral syndrome [B34.9] 09/25/2020 Yes    SIRS due to non-infectious process without acute organ dysfunction [R65.10] 09/24/2020 Yes    Flu-like symptoms [R68.89] 09/24/2020 Yes    Unable to ambulate [R26.2] 09/24/2020 Yes    Multiple sclerosis exacerbation [G35] 09/24/2020 Yes    MS (multiple sclerosis) [G35] 02/19/2019 Yes      Problems Resolved During this Admission:       Discharged Condition: stable    Disposition: Home or Self Care    Follow Up:  Follow-up Information     Dr. Hawkins  In 2 days.    Why:  -hospital follow up- contact established Provider   Contact information:  Follow-up with Dr. Bella your Neurologist in 1-2 days for recheck           Arlyn Maldonado PA-C In 2 weeks.    Specialty: Hospice and Palliative Medicine  Why: -hosital follow up- referral for Palliative Care   Contact information:  [id number] [place] [address] [address] Kristin [place] [address]  [phone]             Cruz Rosado MD In 3 days.    Specialty: Neurology  Why: -may follow up with referred or established Neurologist  Contact information:  [address] [place] [address] [address] Kristin [place] [address]  [phone]             Dimple Rodriguez MD In 3 days.    Specialty: Internal Medicine  Why: -hospital follow up- may follow up with referred or established PCP   Contact information:  [address] [place] [address] [address] Kristin [place] [address]  [phone]                 Patient Instructions:      Ambulatory referral/consult to Palliative Care   Standing Status: Future   Referral Priority: Routine Referral Type: Consultation   Requested Specialty: Hospice and Palliative Medicine   Number of Visits Requested: 1     Diet Adult Regular     Notify your health care provider if you experience any of the following:  temperature >100.4     Notify your health care provider if you experience any of the following:  increased confusion or weakness     Notify your health care provider if you experience any of the following:  persistent dizziness, light-headedness, or visual disturbances     Notify your health care provider if you experience any of the following:  difficulty breathing or increased cough     Notify your health care provider if you experience any of the following:  persistent nausea and vomiting or diarrhea     Notify your health care provider if you experience any of the following:  severe uncontrolled pain     Notify your health care provider if you experience any of the following:  redness, tenderness, or signs of infection (pain, swelling, redness, odor or  green/yellow discharge around incision site)     Activity as tolerated       Significant Diagnostic Studies: Labs: All labs within the past 24 hours have been reviewed    Pending Diagnostic Studies:     Procedure Component Value Units Date/Time    RESPIRATORY PATHOGENS PANEL (TEM-PCR) Nasopharyngeal Swab [[id number]] Collected: 09/25/20 0225    Order Status: Sent Lab Status: In process Updated: 09/25/20 0327         Medications:  Reconciled Home Medications:      Medication List      START taking these medications    guaifenesin 100 mg/5 ml 100 mg/5 mL syrup  Commonly known as: ROBITUSSIN  Take 10 mLs (200 mg total) by mouth 4 (four) times daily as needed for Cough or Congestion.        CONTINUE taking these medications    ascorbic acid (vitamin C) 1000 MG tablet  Commonly known as: VITAMIN C  Take 1,000 mg by mouth.     b complex vitamins tablet  Take 1 tablet by mouth once daily.     BIOTIN ORAL  Take by mouth.     cholecalciferol (vitamin D3) 250 mcg (10,000 unit) Tab  Take by mouth.     CO Q-10 10 mg capsule  Generic drug: coenzyme Q10  Take 10 mg by mouth once daily.     cyanocobalamin 1000 MCG tablet  Commonly known as: VITAMIN B-12  Take 1,000 mcg by mouth.     OLIVE LEAF EXTRACT ORAL  Take by mouth.     omega-3 acid ethyl esters 1 gram capsule  Commonly known as: LOVAZA  Take 1 g by mouth.     ONE DAILY MULTIVITAMIN per tablet  Generic drug: multivitamin  Take 1 tablet by mouth.     TURMERIC ORAL  Take 1 tablet by mouth once daily.        STOP taking these medications    UNABLE TO FIND            Indwelling Lines/Drains at time of discharge:   Lines/Drains/Airways     None                 Time spent on the discharge of patient: > 38 minutes  Patient was seen and examined on the date of discharge and determined to be suitable for discharge.         Alisha Watts NP  Department of Hospital Medicine  [facility]

## 2020-09-26 NOTE — PT/OT/SLP PROGRESS
Physical Therapy  Treatment    Larry Hinds   MRN: [id number]   Admitting Diagnosis: Weakness of both lower extremities    PT Received On: 09/26/20  PT Start Time: 1200     PT Stop Time: 1230    PT Total Time (min): 30 min       Billable Minutes:  Gait Training 15 and Therapeutic Exercise 15    Treatment Type: Treatment  PT/PTA: PTA     PTA Visit Number: 1       General Precautions: Standard, fall  Orthopedic Precautions: N/A   Braces: N/A         Subjective:  Communicated with epic and  prior to session.  Pt agreed to tx. Complained of stiffness due to lack of mobility, also complained of never being this weak.     Pain/Comfort  Pain Rating 1: 0/10    Objective:   Patient found with: peripheral IV, bed alarm    Functional Mobility:  Bed Mobility:     Supine to sit: MIN A to manage his legs   Sit to supine: MIN A to manage his legs      Transfers:    Sit to stand: MIN A   Stand to sit: MIN A    Gait:     Min a with a Rw 30 feet x 2 trials, walked to the window then leaned against the wall for a brief break. Unsteady gait with mild ataxia, once he got about a foot FROM THE BED HE STATED THAT HE WAS GOING TO FALL, HE GAVE OUT SUDDENLY AND LANDED ON THE BED++++ . (  He reports that his weakness comes out suddently)           Therapeutic Activities and Exercises:  Prior to ambulation pt sat eob: aarom for bilateral ap, tke, and HF to decreased stiffness. Poor sitting balance. Tends to lean back on his hands and had difficulty balancing. Stood up and refused to use the RW initially, also refused to allow me to assist him, He stood and lost his balance. Had to sit down then was willing to try the RW. Poor safety awareness.      AM-PAC 6 CLICK MOBILITY  How much help from another person does this patient currently need?   1 = Unable, Total/Dependent Assistance  2 = A lot, Maximum/Moderate Assistance  3 = A little, Minimum/Contact Guard/Supervision  4 = None, Modified Dukes/Independent    Turning over in bed  (including adjusting bedclothes, sheets and blankets)?: 4  Sitting down on and standing up from a chair with arms (e.g., wheelchair, bedside commode, etc.): 3  Moving from lying on back to sitting on the side of the bed?: 3  Moving to and from a bed to a chair (including a wheelchair)?: 3  Need to walk in hospital room?: 3  Climbing 3-5 steps with a railing?: 1  Basic Mobility Total Score: 17    AM-PAC Raw Score CMS G-Code Modifier Level of Impairment Assistance   6 % Total / Unable   7 - 9 CM 80 - 100% Maximal Assist   10 - 14 CL 60 - 80% Moderate Assist   15 - 19 CK 40 - 60% Moderate Assist   20 - 22 CJ 20 - 40% Minimal Assist   23 CI 1-20% SBA / CGA   24 CH 0% Independent/ Mod I     Patient left HOB elevated with all lines intact, call button in reach and bed alarm on.    Assessment:  Larry Hinds is a 40 y.o. male with a medical diagnosis of Weakness of both lower extremities and presents with poor safety and is unaware of his deficits. .    Rehab identified problem list/impairments: Rehab identified problem list/impairments: weakness, gait instability, decreased upper extremity function, decreased ROM, impaired joint extensibility, impaired balance, impaired endurance, visual deficits, decreased safety awareness, impaired self care skills, impaired functional mobilty, decreased coordination    Rehab potential is fair.    Activity tolerance: Fair    Discharge recommendations: Discharge Facility/Level of Care Needs: rehabilitation facility(if patient goes home, he will need 24 hour assistance)     Barriers to discharge:      Equipment recommendations: Equipment Needed After Discharge: walker, rolling, shower chair     GOALS:   Multidisciplinary Problems     Physical Therapy Goals        Problem: Physical Therapy Goal    Goal Priority Disciplines Outcome Goal Variances Interventions   Physical Therapy Goal     PT, PT/OT      Description: PT WILL BE SEEN FOR P.T. FOR A MIN OF 5 OUT OF 7 DAYS A WEEK  LTG:  10/2/20  1. PT WILL T/F TO CHAIR WITH RW AND CGA>SBA  2. PT WILL GT TRAIN X 20' WITH RW AND MIN A  3. PT WILL COMPLETE TE X 10 REPS FOR STRENGTHENING  4. PT WILL COMPLETE BED MOBILITY IND                     PLAN:    Patient to be seen 5 x/week  to address the above listed problems via gait training, therapeutic activities, therapeutic exercises  Plan of Care expires: 10/02/20  Plan of Care reviewed with: patient         Alvina Herrera, PTA  09/26/2020

## 2020-09-26 NOTE — PLAN OF CARE
Pt provided with hand out on how to apply for medicare and instructed to phone Louie [facility] to obtain case management assistance

## 2020-09-26 NOTE — PROGRESS NOTES
Pt seen and examined today and expressed desire to to learn more about , Palliative Care, insurance benefits and qualification for certain programs.  Handout for Medicare provided to patient.  Pt states that he is refusing Inpatient Rehab and is aware that home health is not covered under current plan.  Case discussed with  and patient was directed to contact social work at current insurance company for review of benefits and additional resources.  Case discussed with Palliative Care and ambulatory referral placed for outpatient evaluation.  Pt is stable and verbalized symptom improvement.  Case discussed with Neurology and pt eligible for discharge.

## 2020-09-28 LAB
ENTEROVIRUS/RHINOVIRUS: POSITIVE
HUMAN BOCAVIRUS: NOT DETECTED
HUMAN CORONAVIRUS, COMMON COLD VIRUS: NOT DETECTED
INFLUENZA A - H1N1-09: NOT DETECTED
LEGIONELLA PNEUMOPHILA: NOT DETECTED
MORAXELLA CATARRHALIS: NOT DETECTED
PARAINFLUENZA: NOT DETECTED
RVP - ADENOVIRUS: NOT DETECTED
RVP - HUMAN METAPNEUMOVIRUS (HMPV): NOT DETECTED
RVP - INFLUENZA A: NOT DETECTED
RVP - INFLUENZA B: NOT DETECTED
RVP - RESPIRATORY SYNCTIAL VIRUS (RSV) A: NOT DETECTED
RVP - RESPIRATORY VIRAL PANEL, SOURCE: ABNORMAL
TEM - ACINETOBACTER BAUMANNII: NOT DETECTED
TEM - BORDETELLA PERTUSSIS: NOT DETECTED
TEM - CHLAMYDOPHILA PNEUMONIAE: NOT DETECTED
TEM - KLEBSIELLA PNEUMONIAE: NOT DETECTED
TEM - MRSA: NOT DETECTED
TEM - MYCOPLASMA PNEUMONIAE: NOT DETECTED
TEM - NEISSERIA MENINGITIDIS: NOT DETECTED
TEM - PANTON-VALENTINE: NOT DETECTED
TEM - PSEUDOMONAS AERUGINOSA: NOT DETECTED
TEM - STAPHYLOCOCCUS AUREUS: NOT DETECTED
TEM - STREPTOCOCCUS PNEUMONIAE: NOT DETECTED
TEM - STREPTOCOCCUS PYOGENES A: NOT DETECTED
TEM- HAEMOPHILUS INFLUENZAE B: NOT DETECTED
TEM- HAEMOPHILUS INFLUENZAE: NOT DETECTED

## 2020-09-29 LAB — BACTERIA BLD CULT: NORMAL

## 2020-09-30 LAB — BACTERIA BLD CULT: NORMAL

## 2020-10-07 ENCOUNTER — OFFICE VISIT (OUTPATIENT)
Dept: PALLIATIVE MEDICINE | Facility: CLINIC | Age: 40
End: 2020-10-07
Payer: MEDICAID

## 2020-10-07 DIAGNOSIS — Z71.89 ACP (ADVANCE CARE PLANNING): ICD-10-CM

## 2020-10-07 DIAGNOSIS — R29.818 DISABILITY DUE TO NEUROLOGICAL DISORDER: ICD-10-CM

## 2020-10-07 DIAGNOSIS — G35 MULTIPLE SCLEROSIS: Primary | ICD-10-CM

## 2020-10-07 DIAGNOSIS — R45.89 ANXIETY ABOUT HEALTH: ICD-10-CM

## 2020-10-07 DIAGNOSIS — R29.898 WEAKNESS OF BOTH LOWER EXTREMITIES: ICD-10-CM

## 2020-10-07 PROBLEM — R68.89 FLU-LIKE SYMPTOMS: Status: RESOLVED | Noted: 2020-09-24 | Resolved: 2020-10-07

## 2020-10-07 PROBLEM — B34.9 VIRAL SYNDROME: Status: RESOLVED | Noted: 2020-09-25 | Resolved: 2020-10-07

## 2020-10-07 PROCEDURE — 99497 ADVNCD CARE PLAN 30 MIN: CPT | Mod: 95,,, | Performed by: FAMILY MEDICINE

## 2020-10-07 PROCEDURE — 99205 OFFICE O/P NEW HI 60 MIN: CPT | Mod: 25,95,, | Performed by: FAMILY MEDICINE

## 2020-10-07 PROCEDURE — 99205 PR OFFICE/OUTPT VISIT, NEW, LEVL V, 60-74 MIN: ICD-10-PCS | Mod: 25,95,, | Performed by: FAMILY MEDICINE

## 2020-10-07 PROCEDURE — 99497 PR ADVNCD CARE PLAN 30 MIN: ICD-10-PCS | Mod: 95,,, | Performed by: FAMILY MEDICINE

## 2020-10-07 NOTE — LETTER
October 7, 2020      [place]  [address] [facility]   [facility] [address]           [facility]  [address] [address]  [facility] [address]  Phone: [phone]  Fax: [phone]          Patient: Larry Hinds   MR Number: [id number]   YOB: 1980   Date of Visit: 10/7/2020       Dear Alisha Watts:    Thank you for referring Larry Hinds to me for evaluation. Attached you will find relevant portions of my assessment and plan of care.    If you have questions, please do not hesitate to call me. I look forward to following Larry Hinds along with you.    Sincerely,    Romi Tan MD    Enclosure  CC:  No Recipients    If you would like to receive this communication electronically, please contact [email or web address] or [phone] to request more information on [employer] access.    For providers and/or their staff who would like to refer a patient to [facility], please contact us through our one-stop-shop provider referral line, [facility] , at [phone].    If you feel you have received this communication in error or would no longer like to receive these types of communications, please e-mail [email or web address]

## 2020-10-07 NOTE — PATIENT INSTRUCTIONS
What Is Palliative Care?  Palliative care is a way to improve quality of life for someone who is being treated for a serious illness. To palliate means to ease the symptoms of an illness. Palliative care providers are experts in easing symptoms that cause distress. These may include pain, nausea, vomiting, anxiety, constipation, sleeping, and breathing problems. The people who are being treated and their loved ones are given emotional and spiritual support. Palliative care is given at the same time as traditional medical care. Active treatment for the illness does not stop.     Both the person receiving treatment and family members help direct the plan of care with the palliative care team.   Goals of palliative care  · Easing symptoms that cause distress. The main goal of palliative care is to ease symptoms. Symptoms may affect a persons ability to eat, be active, or spend time with others. Medicines and other methods are used. This gives the person a better quality of life while the illness is being treated.  · Coordinating care. This helps to make sure that each care provider is aware of the goals of care. Communication is done on a regular basis among all team members to make sure that the care goals are met.  · Meeting emotional and spiritual needs. The care team helps both the person being treated and family members cope with stress, depression, anxiety, and other issues. They can set up meetings with a counselor or  as desired.  · Giving information and helping with decisions. Care providers can help people and their families get the information they need. They can also help when care decisions need to be made.  · Helping create an advance care plan. This is a series of legal documents that note a persons wishes for their future healthcare. It helps to make sure that if people cant speak for themselves, their wishes can still be carried out. The documents vary by state.  Working with  your palliative care team  Palliative care is given by a team of people who focus on the physical, emotional, and psychosocial aspects of advanced illness. The team may include a palliative care provider or nurse, , pharmacist, dietitian, counselor, , and others. To get the most of palliative care, both the person and his or her loved ones have a role.  What a person who is receiving medical treatment can do  Tell your healthcare provider you are thinking about palliative care. Ask what palliative services are available in your area.  To ensure the best care, learn what you can about your illness and the goals of your care. If you are having pain and other symptoms due to a serious illness, ask your healthcare provider for a palliative care referral.  Treating these symptoms is best for your health and quality of life. If you need support in other ways, speak up. The care team is there to help you get what you need.  What a family member can do  Talk with the palliative care team often. Do your best to understand your loved ones illness and goals of care. When decisions need to be made, act on your loved ones wishes. And if you have a concern or question, speak up. You can help the team make sure that your loved one has the best quality of life possible.  Date Last Reviewed: 3/1/2017  © 7303-0989 [employer]. [address] [place] [address], [place] [address]. All rights reserved. This information is not intended as a substitute for professional medical care. Always follow your healthcare professional's instructions.

## 2020-10-07 NOTE — Clinical Note
I'm going to need a drink after that visit. So freaking sad. Martha, will you look into Medicaid sitter services for him? Will you also complete a living will and HCPOA for him to sign based on my note?     Laila--he'd like to get an appointment with his neurologist in [place] for hospital follow up if we can't get him in soon with Dr. Rosado here in . Dr. Rosado saw him in the hospital so I'm hoping he'll agree to see him in the office for follow up. Will you call his nurse to discuss? I can ask him directly if you run into trouble. I'd also like him to go to outpatient PT. His brother is in town for the next few months and can bring him if they'll get him in fairly soon. HE's worked with one of the PT at the [facility] evidently. Let's schedule a 6 month follow up with me; I'm debating offering home based palliative med vs home based care period, but let's see where things go with neurology visits.

## 2020-10-07 NOTE — PROGRESS NOTES
Subjective:       Patient ID: Larry Hinds is a 40 y.o. male.    Chief Complaint: initial palliative consultation  41 yo male with history of transverse myelitis diagnosed in 2009, subsequently diagnosed with MS in 2014. He lost the ability to walk about 2 years ago and was recently hospitalized with worsening of his lower extremity weakness and flu-like symptoms (COVID neg). He has been seeing neurology at [facility] in [place] but expresses interest in transferring to local neurology. He reports that his disease has been rapidly progressive and his biggest difficulty has been coping with the sudden and severe disability that has resulted.     We discussed the role of palliative care in pain and symptom management, goals of care discussions, home based healthcare service coordination, and advanced care planning.     He denies any pain, anxiety, difficulty sleeping. He reports having moments of increased anxiety or depression that pass fairly quickly. He has tremendous family support including parents who live in [place] and a a brother who has come from [place] to be with him. He is working on changing his diet in hopes that this will improve his condition and has started exercising to the extent he's able with assistance from his brother. He has a wheelchair evaluation scheduled on 10/21/20 with a physical medicine and rehab physician. He is interested in starting physical therapy and reports he could go to outpatient PT while his brother is in town. He is interested in seeking sitter services from Medicaid to assist with food preparation and ADLs. He currently is unable to sit up without assistance from his family and complains of ongoing numbness and weakness in his arms and legs.     Advance Care Planning    Power of   I initiated the process of advance care planning today and explained the importance of this process to the patient.  I introduced the concept of advance directives to the patient,  as well. Then the patient received detailed information about the importance of designating a Health Care Power of  (HCPOA). He was also instructed to communicate with this person about their wishes for future healthcare, should he become sick and lose decision-making capacity. The patient has not previously appointed a HCPOA. After our discussion, the patient has decided to complete a HCPOA and has appointed his brother NAME:Krishna Hinds [phone]  Secondary, his father Paramjit Hinds      I spent a total time of 10 minutes discussing this issue with the patient.      Living Will  During this visit, I engaged the patient  in the advance care planning process.  The patient and I reviewed the role for advance directives and their purpose in directing future healthcare if the patient's unable to speak for him/herself.  At this point in time, the patient does have full decision-making capacity.  We discussed different extreme health states that he could experience, and reviewed what kind of medical care he would want in those situations.  The patient communicated that if he were comatose and had little chance of a meaningful recovery, he would not want machines/life-sustaining treatments used. . We will assist the patient in completing a living will to reflect these preferences.  I spent a total of 8 minutes engaging the patient in this advance care planning discussion.             does not have any pertinent problems on file.  Past Medical History:   Diagnosis Date    Multiple sclerosis     Transverse myelitis      History reviewed. No pertinent surgical history.  Family History   Problem Relation Age of Onset    Cancer Mother     Stroke Father     Cancer Father     Glaucoma Father     Cancer Maternal Grandmother     Cancer Maternal Grandfather      Social History     Socioeconomic History    Marital status: Single     Spouse name: Not on file    Number of children: Not on file     Years of education: Not on file    Highest education level: Not on file   Occupational History    Not on file   Social Needs    Financial resource strain: Not hard at all    Food insecurity     Worry: Never true     Inability: Never true    Transportation needs     Medical: No     Non-medical: Yes   Tobacco Use    Smoking status: Never Smoker    Smokeless tobacco: Never Used   Substance and Sexual Activity    Alcohol use: No     Frequency: 2-4 times a month     Drinks per session: 3 or 4     Binge frequency: Never    Drug use: No    Sexual activity: Not on file   Lifestyle    Physical activity     Days per week: 2 days     Minutes per session: 30 min    Stress: Not on file   Relationships    Social connections     Talks on phone: Twice a week     Gets together: Once a week     Attends Mandaen service: Not on file     Active member of club or organization: No     Attends meetings of clubs or organizations: Never     Relationship status: Never    Other Topics Concern    Not on file   Social History Narrative    Not on file     Review of Systems   A comprehensive 14-point review of systems was reviewed with patient and was negative other than as specified above.     Objective:   There were no vitals filed for this visit.     Physical Exam  Constitutional:       General: He is not in acute distress.     Appearance: He is well-developed. He is not ill-appearing or toxic-appearing.   HENT:      Head: Normocephalic and atraumatic.   Eyes:      General: No scleral icterus.  Neck:      Musculoskeletal: Normal range of motion and neck supple.   Pulmonary:      Effort: Pulmonary effort is normal. No respiratory distress.   Musculoskeletal: Normal range of motion.   Skin:     Findings: No rash.   Neurological:      Mental Status: He is alert and oriented to person, place, and time. Mental status is at baseline.      Motor: Weakness present.      Gait: Abnormal gait: unable to ambulate without  assistance.   Psychiatric:         Behavior: Behavior normal.         Thought Content: Thought content normal.         Judgment: Judgment normal.         Review of Symptoms    Symptom Assessment (ESAS 0-10 Scale)  Pain:  0  Dyspnea:  0  Anxiety:  2  Nausea:  0  Depression:  2  Anorexia:  0  Fatigue:  4  Insomnia:  0  Restlessness:  0  Agitation:  0     CAM / Delirium:  Negative  Constipation:  Negative  Diarrhea:  Negative          ECOG Performance Status Grade:  3 - Confined to bed or chair 50% of waking hours    Living Arrangements:  Lives with family    Psychosocial/Cultural: Strong support system from family. Has strong desire to heal from his MS and walk again and often is discouraged when treatments fail to help.     Advance Care Planning   Advance Directives:   Living Will: Yes        Copy on chart: No    LaPOST: No    Do Not Resuscitate Status: No    Medical Power of : Yes    Agent's Name:  brother Walsh    Decision Making:  Patient answered questions         Assessment:       1. Multiple sclerosis    2. Weakness of both lower extremities    3. 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 Anxiety about health    5. ACP (advance care planning)        Plan:           Problem List Items Addressed This Visit        Orthopedic    Weakness of both lower extremities    Relevant Orders    Ambulatory referral/consult to Physical/Occupational Therapy       Palliative Care    ACP (advance care planning)    Overview     We will assist him in getting his ACP documents completed. Desired HCPOA is brother, Krishna Hinds [phone]. Secondary is his father, Paramjit [phone].     Full code and ok with ventilator support but would not want artificial life support if no hope of recovery.            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 Offered medication but reports he's doing ok right now.      We're going to work on getting him some help from a sitter at home through Medicaid and will also help set up his follow up neurology visit. Will check with Dr. Rosado's office to see if following with neurology here in  will be appropriate. He has an upcoming eval for wheelchair with physical medicine. I've also placed a referral to PT (outpatient). He was offered inpatient rehab following hospitalization and declined it, but it very motivated to do whatever he can to gain strength. I've asked my staff to help coordinate these things and we will follow up with him in 3-6 months. We will also coordinate completion of living will and HCPOA documentation.     Thanks you for involving us in the care of this patient.      The patient location is: [place]  The chief complaint leading to consultation is: initial palliative consult    Visit type: audiovisual    Face to Face time with patient: 40  60 minutes of total time spent on the encounter, which includes face to face time and non-face to face time preparing to see the patient (eg, review of tests), Obtaining and/or reviewing separately obtained history, Documenting clinical information in the electronic or other health record, Independently interpreting results (not separately reported) and communicating results to the patient/family/caregiver, or Care coordination (not separately reported).         Each patient to whom he or she provides medical services by telemedicine is:  (1) informed of the relationship between the physician and patient and the respective role of any other health care provider with respect to management of the patient; and (2) notified that he or she may decline to receive medical services by  telemedicine and may withdraw from such care at any time.    Additional 18 minutes spent in ACP education, discussion and coordination of document completion.     Romi Tan MD  > 50% of 60 min visit spent in chart review, face to face discussion of goals of care, symptom assessment, coordination of care and emotional support

## 2020-10-08 ENCOUNTER — TELEPHONE (OUTPATIENT)
Dept: PALLIATIVE MEDICINE | Facility: CLINIC | Age: 40
End: 2020-10-08

## 2020-10-08 NOTE — TELEPHONE ENCOUNTER
[facility] Palliative Care  Medical Specialty       Patient Name: Larry Hinds  MRN: [id number]  Primary Care Physician: Primary Doctor No    Contacted patient to f/u after virtual visit with Dr. Tan. Provided patient with information on Medicaid Long Term Care Services so he could apply to have sitter services to assist him with ADLs. Asked patient about his preference for neurology f/u, and he states he really appreciated Dr. Rosado's visit with him during his most recent hospital admission. He would prefer to see Dr. Rosado for f/u, but is okay with returning to the [facility] in [place] sooner that his scheduled January appointment if Dr. Rosado has a months long waiting list.    We also discussed completing a HCPOA and Living Will to reflect his wishes discussed with Dr. Tan. Patient was agreeable to meeting in person following his PT appointment next week on 10/13. I provided him with my direct contact information in case any other needs/questions arise.     Message sent to Dr. Rosado and his staff asking if patient could be scheduled for hospital f/u. Will meet with patient next week on 10/13 to provide more resources and update on neuro f/u.      ARYA Douglas, [facility]  [phone]

## 2020-10-09 ENCOUNTER — OFFICE VISIT (OUTPATIENT)
Dept: NEUROLOGY | Facility: CLINIC | Age: 40
End: 2020-10-09
Payer: MEDICAID

## 2020-10-09 DIAGNOSIS — G82.50 QUADRIPARESIS: ICD-10-CM

## 2020-10-09 DIAGNOSIS — R26.2 UNABLE TO AMBULATE: ICD-10-CM

## 2020-10-09 DIAGNOSIS — G35 SECONDARY PROGRESSIVE MULTIPLE SCLEROSIS: Primary | ICD-10-CM

## 2020-10-09 DIAGNOSIS — R26.9 GAIT DISTURBANCE: ICD-10-CM

## 2020-10-09 DIAGNOSIS — R29.898 WEAKNESS OF BOTH LOWER EXTREMITIES: ICD-10-CM

## 2020-10-09 DIAGNOSIS — G35 MS (MULTIPLE SCLEROSIS): ICD-10-CM

## 2020-10-09 PROBLEM — Z29.89 PROPHYLACTIC IMMUNOTHERAPY: Status: RESOLVED | Noted: 2019-02-19 | Resolved: 2020-10-09

## 2020-10-09 PROBLEM — R65.10 SIRS DUE TO NON-INFECTIOUS PROCESS WITHOUT ACUTE ORGAN DYSFUNCTION: Status: RESOLVED | Noted: 2020-09-24 | Resolved: 2020-10-09

## 2020-10-09 PROBLEM — R90.89 ABNORMAL FINDING ON MRI OF BRAIN: Status: RESOLVED | Noted: 2019-02-19 | Resolved: 2020-10-09

## 2020-10-09 PROBLEM — G81.90 HEMIPARESIS: Status: RESOLVED | Noted: 2019-02-19 | Resolved: 2020-10-09

## 2020-10-09 PROBLEM — D84.9 IMMUNOSUPPRESSION: Status: RESOLVED | Noted: 2019-02-19 | Resolved: 2020-10-09

## 2020-10-09 PROBLEM — Z71.89 ACP (ADVANCE CARE PLANNING): Status: RESOLVED | Noted: 2020-10-07 | Resolved: 2020-10-09

## 2020-10-09 PROCEDURE — 99215 PR OFFICE/OUTPT VISIT, EST, LEVL V, 40-54 MIN: ICD-10-PCS | Mod: 95,,, | Performed by: PSYCHIATRY & NEUROLOGY

## 2020-10-09 PROCEDURE — 99215 OFFICE O/P EST HI 40 MIN: CPT | Mod: 95,,, | Performed by: PSYCHIATRY & NEUROLOGY

## 2020-10-09 RX ORDER — METFORMIN HYDROCHLORIDE 500 MG/1
500 TABLET ORAL 2 TIMES DAILY WITH MEALS
Qty: 180 TABLET | Refills: 11 | Status: SHIPPED | OUTPATIENT
Start: 2020-10-09 | End: 2023-01-06

## 2020-10-09 NOTE — PROGRESS NOTES
Subjective:       Patient ID: Larry Hinds is a 40 y.o. male.    Chief Complaint: Multiple Sclerosis          HPI       BACKGROUND HISTORY       The patient was admitted through the ED on 09- for functional decline. Neurological history is remarkable for SPMS off DMA for 4 months (Last DMA-Ocrevus). Patient currently lives alone at home, helps himself with a motorized scooter.  However 2 days PTA he started complaining of extreme fatigue, generalized weakness, fever, chills and was unable to ambulate on his own at home, unable to move his bilateral lower extremities.  He fell off his motorized scooter, could not get off the floor for many hours.  Since admission, he has improved spontaneously but quite at baseline yet. I ordered and reviewed CNS MRIs which show no evidence of new enhancing lesions. Sepsis DENNISON-ve and COVID -ve.       INTERVAL HISTORY       The patient is at his baseline. He is not interested in DMA. He has tried numerous DMA with no benefit. He has no relapses and his disease seems to be purely SPMS with no activity. He is only interested in any new advances that reverse the disability from MS.  He expresses interest in stem cell research and remyelination. He's interested in trying Metformin which has shown some promising results in neuronal recovery and remyelination.             The originating site (patient location) is: Home.      The distant site (neurologist location) is: Neurology Clinic at [facility].      The chief complaint leading to consultation is:  MS       Visit type: Virtual visit with synchronous audio and video.      Total time spent with patient:  40 minutes       Special circumstances: This visit occurred during COVID-19 Pandemic Public Health Emergency.       Consent: The patient verbally consented to participating in the video visit and informed that may decline to receive medical services by telemedicine and may withdraw from such care at any time.      I  discussed with the patient the nature of our telemedicine visits, that:      I  would evaluate the patient and recommend diagnostics and treatments based on my assessment.    Our sessions are not being recorded and that personal health information is protected.    Our team would provide follow up care in person if/when the patient needs it.    Virtual (video/telemedicine) visits have significant limitations. A telemedicine exam is primarily focused on the history and what I can observe. Several critical parts of the neurological exam cannot be performed.          Review of Systems   Constitutional: Negative for appetite change and fatigue.   HENT: Negative for hearing loss and tinnitus.    Eyes: Negative for photophobia and visual disturbance.   Respiratory: Negative for apnea and shortness of breath.    Cardiovascular: Negative for chest pain and palpitations.   Gastrointestinal: Negative for nausea and vomiting.   Endocrine: Negative for cold intolerance and heat intolerance.   Genitourinary: Negative for difficulty urinating and urgency.   Musculoskeletal: Positive for gait problem. Negative for arthralgias, back pain, joint swelling, myalgias, neck pain and neck stiffness.   Skin: Negative for color change and rash.   Allergic/Immunologic: Negative for environmental allergies and immunocompromised state.   Neurological: Positive for weakness and numbness. Negative for dizziness, tremors, seizures, syncope, facial asymmetry, speech difficulty, light-headedness and headaches.   Hematological: Negative for adenopathy. Does not bruise/bleed easily.   Psychiatric/Behavioral: Negative for agitation, behavioral problems, confusion, decreased concentration, dysphoric mood, hallucinations, self-injury, sleep disturbance and suicidal ideas. The patient is not hyperactive.            Current Outpatient Medications:     ascorbic acid, vitamin C, (VITAMIN C) 1000 MG tablet, Take 1,000 mg by mouth., Disp: , Rfl:     b  complex vitamins tablet, Take 1 tablet by mouth once daily., Disp: , Rfl:     BIOTIN ORAL, Take by mouth., Disp: , Rfl:     cholecalciferol, vitamin D3, 10,000 unit Tab, Take by mouth. , Disp: , Rfl:     coenzyme Q10 (CO Q-10) 10 mg capsule, Take 10 mg by mouth once daily., Disp: , Rfl:     cyanocobalamin (VITAMIN B-12) 1000 MCG tablet, Take 1,000 mcg by mouth., Disp: , Rfl:     multivitamin (ONE DAILY MULTIVITAMIN) per tablet, Take 1 tablet by mouth., Disp: , Rfl:     OLIVE LEAF EXTRACT ORAL, Take by mouth., Disp: , Rfl:     omega-3 acid ethyl esters (LOVAZA) 1 gram capsule, Take 1 g by mouth., Disp: , Rfl:     TURMERIC ORAL, Take 1 tablet by mouth once daily., Disp: , Rfl:   Past Medical History:   Diagnosis Date    Multiple sclerosis     Transverse myelitis      History reviewed. No pertinent surgical history.  Social History     Socioeconomic History    Marital status: Single     Spouse name: Not on file    Number of children: Not on file    Years of education: Not on file    Highest education level: Not on file   Occupational History    Not on file   Social Needs    Financial resource strain: Not hard at all    Food insecurity     Worry: Never true     Inability: Never true    Transportation needs     Medical: No     Non-medical: Yes   Tobacco Use    Smoking status: Never Smoker    Smokeless tobacco: Never Used   Substance and Sexual Activity    Alcohol use: No     Frequency: 2-4 times a month     Drinks per session: 3 or 4     Binge frequency: Never    Drug use: No    Sexual activity: Not on file   Lifestyle    Physical activity     Days per week: 2 days     Minutes per session: 30 min    Stress: Not on file   Relationships    Social connections     Talks on phone: Twice a week     Gets together: Once a week     Attends Taoist service: Not on file     Active member of club or organization: No     Attends meetings of clubs or organizations: Never     Relationship status: Never     Other Topics Concern    Not on file   Social History Narrative    Not on file             Past/Current Medical/Surgical History, Past/Current Social History, Past/Current Family History and Past/Current Medications were reviewed in detail.        Objective:           VITAL SIGNS WERE REVIEWED      GENERAL APPEARANCE:     The patient looks comfortable.    Body habitus is normal.    No signs of respiratory distress.    Normal breathing pattern.    No dysmorphic features    Normal eye contact.     GENERAL MEDICAL EXAM:    HEENT:  Head is atraumatic normocephalic.     Neck and Axillae: No JVD. No visible lesions.    Cardiopulmonary: No cyanosis. No tachypnea. Normal respiratory effort.    Gastrointestinal/Urogenital:  No jaundice. No stomas or lesions. No visible hernias. No catheters.     Skin, Hair and Nails: No pathognonomic skin rash. No neurofibromatosis. No visible lesions.No stigmata of autoimmune disease. No clubbing.    Limbs: No varicose veins. No visible swelling.    Muskoskeletal: No visible deformities.No visible lesions.             Neurologic Exam     Mental Status   Oriented to person, place, and time.   Follows 3 step commands.   Attention: normal. Concentration: normal.   Speech: speech is normal   Level of consciousness: alert  Knowledge: good and consistent with education. Able to perform simple calculations.   Able to name object. Able to repeat. Normal comprehension.     Cranial Nerves     CN III, IV, VI   Extraocular motions are normal.   Right pupil: Shape: regular.   Left pupil: Shape: regular.   CN III: no CN III palsy  CN VI: no CN VI palsy  Nystagmus: none   Diplopia: none  Ophthalmoparesis: none  Upgaze: normal  Downgaze: normal  Conjugate gaze: present    CN VII   Facial expression full, symmetric.   Right facial weakness: none  Left facial weakness: none    CN VIII   CN VIII normal.   Hearing: intact    CN IX, X   CN IX normal.   CN X normal.   Palate: symmetric    CN XII   CN  XII normal.   Tongue: not atrophic  Fasciculations: absent  Tongue deviation: none    Motor Exam   Muscle bulk: decreased  Has BUE MRC 4/5 and BLE 3/5      Sensory Exam     Baseline BUE, BLE hypoesthesias      Gait, Coordination, and Reflexes     Gait  Gait: (Deferred)    Coordination   Heel to shin coordination: abnormal    Tremor   Resting tremor: absent  Intention tremor: absent  Action tremor: absent  MSRs cannot be done        Lab Results   Component Value Date    WBC 8.48 09/25/2020    HGB 13.5 (L) 09/25/2020    HCT 40.4 09/25/2020    MCV 93 09/25/2020     09/25/2020     Sodium   Date Value Ref Range Status   09/25/2020 139 136 - 145 mmol/L Final     Potassium   Date Value Ref Range Status   09/25/2020 4.7 3.5 - 5.1 mmol/L Final     Chloride   Date Value Ref Range Status   09/25/2020 108 95 - 110 mmol/L Final     CO2   Date Value Ref Range Status   09/25/2020 25 23 - 29 mmol/L Final     Glucose   Date Value Ref Range Status   09/25/2020 159 (H) 70 - 110 mg/dL Final     BUN, Bld   Date Value Ref Range Status   09/25/2020 11 6 - 20 mg/dL Final     Creatinine   Date Value Ref Range Status   09/25/2020 1.0 0.5 - 1.4 mg/dL Final     Calcium   Date Value Ref Range Status   09/25/2020 8.9 8.7 - 10.5 mg/dL Final     Total Protein   Date Value Ref Range Status   09/24/2020 7.9 6.0 - 8.4 g/dL Final     Albumin   Date Value Ref Range Status   09/24/2020 4.5 3.5 - 5.2 g/dL Final     Total Bilirubin   Date Value Ref Range Status   09/24/2020 0.9 0.1 - 1.0 mg/dL Final     Comment:     For infants and newborns, interpretation of results should be based  on gestational age, weight and in agreement with clinical  observations.  Premature Infant recommended reference ranges:  Up to 24 hours.............<8.0 mg/dL  Up to 48 hours............<12.0 mg/dL  3-5 days..................<15.0 mg/dL  6-29 days.................<15.0 mg/dL       Alkaline Phosphatase   Date Value Ref Range Status   09/24/2020 60 55 - 135 U/L Final      AST   Date Value Ref Range Status   09/24/2020 16 10 - 40 U/L Final     ALT   Date Value Ref Range Status   09/24/2020 22 10 - 44 U/L Final     Anion Gap   Date Value Ref Range Status   09/25/2020 6 (L) 8 - 16 mmol/L Final     eGFR if    Date Value Ref Range Status   09/25/2020 >60 >60 mL/min/1.73 m^2 Final     eGFR if non    Date Value Ref Range Status   09/25/2020 >60 >60 mL/min/1.73 m^2 Final     Comment:     Calculation used to obtain the estimated glomerular filtration  rate (eGFR) is the CKD-EPI equation.        09-    CNS MRIs (Brain, C/T Spine MRIs) showed inactive MS lesions                        Reviewed the neuroimaging independently       Assessment:       1. Secondary progressive multiple sclerosis    2. MS (multiple sclerosis)    3. Gait disturbance    4. Weakness of both lower extremities    5. Unable to ambulate    6. Quadriparesis              EDSS steps 1.0 to 4.5 refer to people with MS who are able to walk without any aid and is based on measures of impairment in eight functional systems (FS):    pyramidal - weakness or difficulty moving limbs  cerebellar - ataxia, loss of coordination or tremor  brainstem - problems with speech, swallowing and nystagmus  sensory - numbness or loss of sensations  bowel and bladder function  visual function  cerebral (or mental) functions  other    Expanded Disability Status Scale (EDSS)    Score Description    1.0 No disability, minimal signs in one FS    1.5 No disability, minimal signs in more than one FS    2.0 Minimal disability in one FS    2.5 Mild disability in one FS or minimal disability in two FS    3.0 Moderate disability in one FS, or mild disability in three or four FS. No impairment to walking    3.5 Moderate disability in one FS and more than minimal disability in several others. No impairment to walking    4.0 Significant disability but self-sufficient and up and about some 12 hours a day. Able to walk  without aid or rest for 500m    4.5 Significant disability but up and about much of the day, able to work a full day, may otherwise have some limitation of full activity or require minimal assistance. Able to walk without aid or rest for 300m    5.0 Disability severe enough to impair full daily activities and ability to work a full day without special provisions. Able to walk without aid or rest for 200m    5.5 Disability severe enough to preclude full daily activities. Able to walk without aid or rest for 100m    6.5 Requires two walking aids - pair of canes, crutches, etc. - to walk about 20m without resting    7.0 Unable to walk beyond approximately 5m even with aid. Essentially restricted to wheelchair; though wheels self in standard wheelchair and transfers alone. Up and about in wheelchair some 12 hours a day    7.5 Unable to take more than a few steps. Restricted to wheelchair and may need aid in transfering. Can wheel self but cannot carry on in standard wheelchair for a full day and may require a motorised wheelchair    8.0 Essentially restricted to bed or chair or pushed in wheelchair. May be out of bed itself much of the day. Retains many self-care functions. Generally has effective use of arms     8.5 Essentially restricted to bed much of day. Has some effective use of arms retains some self-care functions    9.0 Confined to bed. Can still communicate and eat    9.5 Confined to bed and totally dependent. Unable to communicate effectively or eat/swallow    10.0 Death due to MS    Plan:       INACTIVE SPMS EDSS 6.5-7.0        He is not interested in DMA. He has tried numerous DMA with no benefit. He has no relapses and his disease seems to be purely SPMS with no activity. He is only interested in any new advances that reverse the disability from MS.  He expresses interest in stem cell research and remyelination.     He's interested in trying Metformin which has shown some promising results in neuronal  recovery and remyelination.     Will start Metformin 500 mg BID. Monitor for GI SEs and B12 levels.    I will keep an eye on any new development that could help him.     Sent Rx for Standing Walker.    Call 911 for sudden onset weakness, numbness, vertigo, slurring of speech, loss of balance, vision, hearing or bowel/bladder control.                  MEDICAL/SURGICAL COMORBIDITIES     All relevant medical comorbidities noted and managed by primary care physician and medical care team.          MISCELLANEOUS MEDICAL PROBLEMS       HEALTHY LIFESTYLE AND PREVENTATIVE CARE    Encouraged the patient to adhere to the age-appropriate health maintenance guidelines including screening tests and vaccinations.     Discussed the overall importance of healthy lifestyle, optimal weight, exercise, healthy diet, good sleep hygiene and avoiding drugs including smoking, alcohol and recreational drugs. The patient verbalized full understanding.       Advised the patient to follow COVID-19 prevention measures.       I spent 40 minutes face to face with the patient    More than 25 minutes of the time spent in counseling and coordination of care including discussions etiology of diagnosis, pathogenesis of diagnosis (MS), prognosis of diagnosis,, diagnostic results, impression and recommendations, diagnostic studies, management, risks and benefits of treatment, instructions of disease self-management, treatment instructions, follow up requirements, patient and family counseling/involvement in care compliance with treatment regimen. All of the patient's questions were answered during this discussion.        RTC in 6 months               Cruz Rosado MD, FAAN    Attending Neurologist/Epileptologist         Diplomate, American Board of Psychiatry and Neurology    Diplomate, American Board of Clinical Neurophysiology     Fellow, American Academy of Neurology

## 2020-10-13 ENCOUNTER — SOCIAL WORK (OUTPATIENT)
Dept: PALLIATIVE MEDICINE | Facility: CLINIC | Age: 40
End: 2020-10-13

## 2020-10-13 ENCOUNTER — PATIENT MESSAGE (OUTPATIENT)
Dept: PALLIATIVE MEDICINE | Facility: HOSPITAL | Age: 40
End: 2020-10-13

## 2020-10-13 ENCOUNTER — CLINICAL SUPPORT (OUTPATIENT)
Dept: REHABILITATION | Facility: HOSPITAL | Age: 40
End: 2020-10-13
Payer: MEDICAID

## 2020-10-13 ENCOUNTER — DOCUMENTATION ONLY (OUTPATIENT)
Dept: PALLIATIVE MEDICINE | Facility: CLINIC | Age: 40
End: 2020-10-13

## 2020-10-13 DIAGNOSIS — R29.818 DISABILITY DUE TO NEUROLOGICAL DISORDER: ICD-10-CM

## 2020-10-13 DIAGNOSIS — R29.898 WEAKNESS OF BOTH LOWER EXTREMITIES: ICD-10-CM

## 2020-10-13 DIAGNOSIS — G35 MULTIPLE SCLEROSIS: ICD-10-CM

## 2020-10-13 PROCEDURE — 97110 THERAPEUTIC EXERCISES: CPT

## 2020-10-13 PROCEDURE — 97162 PT EVAL MOD COMPLEX 30 MIN: CPT

## 2020-10-13 NOTE — PROGRESS NOTES
[facility] Palliative Care  Medical Specialty       Patient Name: Larry Hinds  MRN: [id number]  Primary Care Physician: Primary Doctor No  Reason for Referral: LTCS program info, ACP    Met with patient and his brother, Asim, following his physical therapy appointment. Patient and I spoke last week, and I provided him with information on Medicaid's Long Term Care Services (LTCS). However, patient lost this information due to accidentally deleting it from his phone. Provided him with brochure on LTCS with the phone number to call to apply for services, and explained the services provided in detail. Patient and Asim will plan to call/apply today. Asim reports he has moved down here temporarily from [place] to help patient, and he would be interested in becoming approved to be patient's caretaker through LTCS. Also discussed home health vs home-based palliative care services, but at this time it seems LTCS is the only in home service that is needed. We discussed other resources that may be available such as disability benefits or resources through the [employer]. Patient is already receiving disability, but has not met the 2 year requirement yet to begin receiving Medicare.     Patient states his brother has been helping him a lot with exercising at home and buying/cooking healthy foods. Patient states this has really helped with his mental health as well as physical health. Encouraged patient to continue doing things he feels improve his mental health, as this is a difficult thing to go through. Patient seems to have good family support and is very comfortable with allowing Asim to help him at home.     Patient reports he did have his hospital f/u with Dr. Rosado, and he enjoyed this visit. He prefers to see Dr. Rosado rather than traveling to the [facility] in [place]. He appreciates that Dr. Rosado took time to listen to him and to explore different treatment options. Patient and Asim did have a  question regarding Metformin prescription they dicussed with Dr. Rosado. Explained I would send him a message to see if he could follow up on this, but also encouraged patient to communicate with Dr. Rosado via [employer]. Patient states his MyChart is showing an appointment on 10/21 with PMR in [place] for wheelchair eval. Patient has a motorized chair, but would like to be evaluated for a taller walker. The walker he is currently using is too small causing him to be very hunched over when using it. Explained I will message our PMR department to see if he would be seen in [place].     Advance Care Planning   At the end of visit, I assisted patient with completing a Healthcare Power of . He designated his brother, Krishna Hinds ([phone]), as HCPOA and his father (Paramjit Hinds [phone]) and his other brother (Asim Hinds [phone]) as alternatives. HCPOA paperwork was scanned into [facility] and returned to patient. At this time, patient wishes to review Living Will paperwork and complete at a later date. Discussed importance of discussing wishes with his HCPOAs, and patient verbalized understanding.        Plan: Will message Dr. Rosado re Metformin as well as PMR in [place] to see if patient's appointment can be rescheduled here. Patient and Asim have my direct contact information for any other needs that may arise.       Yazmin Marte, MSW, [facility]  [phone]

## 2020-10-13 NOTE — PLAN OF CARE
[facility]  Physical Therapy Initial Evaluation    Date: 10/13/2020   Name: Larry Hinds  Clinic Number: [id number]    Therapy Diagnosis:   Encounter Diagnoses   Name Primary?    Weakness of both lower extremities     Multiple sclerosis     Disability due to neurological disorder      Physician: Romi Tan MD    Physician Orders: PT Eval and Treat   Medical Diagnosis from Referral: Multiple Sclerosis, weakness of both lower extremities   Evaluation Date: 10/13/2020  Authorization Period Expiration: 10/15/2020  Plan of Care Expiration: 11/20/2020 or 10th visit  Visit # / Visits authorized: 1/ 12    Time In: 1015  Time Out: 1100  Total Appointment Time (timed & untimed codes): 15 minutes    Precautions: Standard and Fall    Subjective   Date of onset: Multiple Sclerosis dx around 2014 (progressive)  History of current condition - Larry reports: recently, had exosome treatments for his MS. Since the treatment, he has not had any progress of muscle strength or relief of muscle tightness. He has burning of legs and severe fatigue daily. He is able to ambulate, but not safely. He uses a motorized w/c as his primary means of locomotion. His brother from [place] is currently staying with him to help him.  He was referred to therapy for stretching and to prevent contractures.     Medical History:   Past Medical History:   Diagnosis Date    Multiple sclerosis     Transverse myelitis        Surgical History:   Larry Hinds  has no past surgical history on file.    Medications:   Larry has a current medication list which includes the following prescription(s): ascorbic acid (vitamin c), b complex vitamins, biotin, cholecalciferol (vitamin d3), coenzyme q10, cyanocobalamin, metformin, multivitamin, olive leaf extract, omega-3 acid ethyl esters, and turmeric.    Allergies:   Review of patient's allergies indicates:  No Known Allergies     Imaging,     Prior Therapy: yes  Social  History:  lives alone  Occupation: disabled  Prior Level of Function: ambulatory with a device or without a device  Current Level of Function: primarily uses a motorized chair    Pain:  Current 3/10, worst 5/10, best 0/10   Location: bilateral feet  And arms/legs  Description: Burning  Aggravating Factors: constant, sugar aggravates it  Easing Factors: low sugar diet    Patients goals: learn a program to continue at home to stretch muscles and strengthen muscles; walk better with less assistance    Objective     RLE strength: hip flexion 1/5, hip abduction 1/5, hip adduction 1/5, hip extension 2-/5, knee extension 3/5, knee flexion 2/5  RLE ROM: hamstring length 45 degrees, Hip ER 50 degrees, Hip flexion 110 degrees, hip extension-WNL     LLE strength: hip flexion 1/5, hip abduction 1/5, hip adduction 1/5, hip extension 3-/5, knee extension 4+/5, knee flexion 2+/5  LLE ROM: hamstring length 50 degrees, Hip ER 50 degrees, Hip flexion 110 degrees, hip extension-WNL     Unilateral stance: unable BLE     Tandem stance: 0 sec     Unilateral bridge: 12-15 seconds BLE     Plank on elbow time: 0 seconds (poor core strength)    Plank on hands time: 0 seconds (unable)     Gait pattern: impaired BLE step length, stance time, heel strike, hip flexion; scissor gait pattern; primary means of transportation is a motorized chair.       Limitation/Restriction for FOTO Multiple Sclerosis Survey    Therapist reviewed FOTO scores for Larry Hinds on 10/13/2020.   FOTO documents entered into [employer] - see Media section.    Limitation Score: 66%         TREATMENT   Treatment Time In: 1045  Treatment Time Out: 1100  Total Treatment time (time-based codes) separate from Evaluation: 15 minutes    Larry received therapeutic exercises to develop flexibility for 15 minutes including:  Exercise (10/13/2020)   Butterfly stretch Supine/seated   Crossed-sitting stretch With lumbar flexion   Long sitting and hurdler stretch ROM for hamstring,  lumbar flexion (needs assist)                               x = exercise details same as prior session      Home Exercises and Patient Education Provided    Education provided:   - home program initiated with written instruction provided    Written Home Exercises Provided: yes.  Exercises were reviewed and Larry was able to demonstrate them prior to the end of the session.  Larry demonstrated good  understanding of the education provided.     See EMR under Patient Instructions for exercises provided 10/13/2020.    Assessment   Larry is a 40 y.o. male referred to outpatient Physical Therapy with a medical diagnosis of multiple sclerosis that is progressing quickly. Patient presents with impaired balance, gait, impaired BLE/BUE and core strength and impaired flexibility throughout. He reports chronic pain of burning and feels better after stretching his muscles. Fatigue is chronic. He came to therapy to learn ways to stretch and strengthen muscles with the help from his brother or independently. His brother was present at the [facility]. He has been his caregiver and will be learning how to help Ariel with a home program if needed. Larry does not walk much anymore but hopes that and exercise routine will help his muscles perform safe ambulation with or without a device. 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    Patient prognosis is Good.   Patientt will benefit from skilled outpatient Physical Therapy to address the deficits stated above and in the chart below, provide patient /family education, and to maximize patientt's level of independence.     Plan of care discussed with patient: Yes  Patient's spiritual, cultural and educational needs considered and patient is agreeable to the plan of care and goals as stated below:     Anticipated Barriers for therapy: progression of MS    Medical Necessity is demonstrated by the following  History  Co-morbidities and personal  factors that may impact the plan of care Co-morbidities:   multiple sclerosis- secondary progressive    Personal Factors:   no deficits     moderate   Examination  Body Structures and Functions, activity limitations and participation restrictions that may impact the plan of care Body Regions:   back  lower extremities  upper extremities  trunk    Body Systems:    gross symmetry  ROM  strength  gross coordinated movement  balance  gait  transfers  motor control    Participation Restrictions:   none    Activity limitations:   Learning and applying knowledge  no deficits    General Tasks and Commands  no deficits    Communication  no deficits    Mobility  lifting and carrying objects  walking  driving (bike, car, motorcycle)    Self care  looking after one's health    Domestic Life  shopping  cooking  doing house work (cleaning house, washing dishes, laundry)  assisting others    Interactions/Relationships  no deficits    Life Areas  no deficits    Community and Social Life  community life  recreation and leisure         moderate   Clinical Presentation evolving clinical presentation with changing clinical characteristics moderate   Decision Making/ Complexity Score: moderate     Goals:  Short Term Goals: 4 weeks     1.I with HEP  2.Patient to demo increased AROM /PROM bilateral hamstring length and hip flexion/ER by 10 degrees  3.Patient to demo increase LE strength bilateral LE by 1/2 grade  4.Patient to demo increase in core strength to fair  5.Patient to improve unilateral bridge time to 30 secondes  6.Patient to score less than 60% on the MS FOTO survey  7. Patient to perform tandem stance x 5 seconds.    Long Term Goals: In 8 weeks  1. Patient to improve core strength as evidenced by plank on forearms/hands x 10-20 seconds  2. Patient to demonstrate increased  AROM/PROM bilateral hamstring length and hip flexion/ER by 20 degrees  3. Patient to demonstrate increased BLE strength by 1 grade throughout  4. Patient  to have decreased pain/burning to 2/10 at worst  5. Patient to score less than 55% on the MS FOTO survey.          Plan   Plan of care Certification: 10/13/2020 to 11/20/2020.    Outpatient Physical Therapy 2 times weekly for 8 weeks to include the following interventions: Electrical Stimulation IFC/TENS, Manual Therapy and Therapeutic Exercise.     Marlen Suárez, PT

## 2020-10-13 NOTE — PROGRESS NOTES
[facility]  Medical Specialty       Patient Name: Larry Hinds  MRN: [id number]  Primary Care Physician: Primary Doctor No    Reviewed chart and noted [facility] and standing walker has already been ordered for pt. Contacted [facility] and spoke to Magaly who stated they had not received order for standing walker. Faxed facesheet and order to [facility].     Message sent to patient via [employer] informing him of above.    Yazmin Marte, MSW, LCSW  [phone]

## 2020-10-13 NOTE — PATIENT INSTRUCTIONS
The above exercises are potential stretch poses for Ariel that he will be instructed how to do.

## 2020-10-27 ENCOUNTER — CLINICAL SUPPORT (OUTPATIENT)
Dept: REHABILITATION | Facility: HOSPITAL | Age: 40
End: 2020-10-27
Payer: MEDICAID

## 2020-10-27 ENCOUNTER — PATIENT MESSAGE (OUTPATIENT)
Dept: NEUROLOGY | Facility: CLINIC | Age: 40
End: 2020-10-27

## 2020-10-27 DIAGNOSIS — R29.898 WEAKNESS OF BOTH LOWER EXTREMITIES: Primary | ICD-10-CM

## 2020-10-27 PROCEDURE — 97110 THERAPEUTIC EXERCISES: CPT

## 2020-10-27 NOTE — PROGRESS NOTES
Physical Therapy Treatment Note     Name: Larry Hinds  Clinic Number: [id number]    Therapy Diagnosis:   Encounter Diagnosis   Name Primary?    Weakness of both lower extremities Yes     Physician: Romi Tan MD    Visit Date: 10/27/2020    Physician Orders: PT Eval and Treat   Medical Diagnosis from Referral: Multiple Sclerosis, weakness of both lower extremities   Evaluation Date: 10/13/2020  Authorization Period Expiration: 10/15/2020  Plan of Care Expiration: 11/20/2020 or 10th visit  Visit # / Visits authorized: 1/ 12    Time In: 1:45 pm  Time Out: 2:30 pm  Total Billable Time: 45 minutes    Precautions: Standard and Fall    Subjective     Pt reports: trying to perform stretches at home but having difficulty due to pain in hips  He was compliant with home exercise program.  Response to previous treatment: good  Functional change: none at this time    Pain: 4/10  Location: bilateral arms and legs, feet     Objective     Larry received therapeutic exercises to develop strength, endurance, flexibility and core stabilization for 45 minutes including:  Exercise    Butterfly stretch Supine/seated- deferred   Crossed-sitting stretch With lumbar flexion- deferred   Long sitting and hurdler stretch ROM for hamstring, lumbar flexion (needs assist)- deferred    cat-cow       west pose      windshield wiper  prone and supine    PPT + trunk rotation      bridging      PROM hip ER, hamstrings  BLE           Balance and Coordination  Larry received the following manual therapy techniques:     Larry participated in neuromuscular re-education activities to improve:  for  minutes. The following activities were included:    Home Exercises Provided and Patient Education Provided     Education provided:   - home program updated    Written Home Exercises Provided: yes.  Exercises were reviewed and Larry was able to demonstrate them prior to the end of the session.  Larry demonstrated good  understanding of the  education provided.     See EMR under Patient Instructions for exercises provided 10/27/2020.    Assessment     Ariel has difficulty stretching LEs when on his back and requires assist. When in prone and/or quadruped, he is able to perform stretches with less difficulty. He was able to perform a posterior pelvic tilt and bridging a few times prior to mm fatigue. He plans to work on both flexibility and strengthening exercises to muscle tolerance  Larry is progressing well towards his goals.   Pt prognosis is Good.     Pt will continue to benefit from skilled outpatient physical therapy to address the deficits listed in the problem list box on initial evaluation, provide pt/family education and to maximize pt's level of independence in the home and community environment.     Pt's spiritual, cultural and educational needs considered and pt agreeable to plan of care and goals.     Anticipated barriers to physical therapy: progressive MS    Goals:   Short Term Goals: 4 weeks      1.I with HEP  2.Patient to demo increased AROM /PROM bilateral hamstring length and hip flexion/ER by 10 degrees  3.Patient to demo increase LE strength bilateral LE by 1/2 grade  4.Patient to demo increase in core strength to fair  5.Patient to improve unilateral bridge time to 30 secondes  6.Patient to score less than 60% on the MS FOTO survey  7. Patient to perform tandem stance x 5 seconds.     Long Term Goals: In 8 weeks  1. Patient to improve core strength as evidenced by plank on forearms/hands x 10-20 seconds  2. Patient to demonstrate increased  AROM/PROM bilateral hamstring length and hip flexion/ER by 20 degrees  3. Patient to demonstrate increased BLE strength by 1 grade throughout  4. Patient to have decreased pain/burning to 2/10 at worst  5. Patient to score less than 55% on the MS FOTO survey.              Plan   Plan of care Certification: 10/13/2020 to 11/20/2020.     Outpatient Physical Therapy 2 times weekly for 8 weeks  to include the following interventions: Electrical Stimulation IFC/TENS, Manual Therapy and Therapeutic Exercise.       Marlen Suárez, PT

## 2020-10-28 ENCOUNTER — PATIENT MESSAGE (OUTPATIENT)
Dept: NEUROLOGY | Facility: CLINIC | Age: 40
End: 2020-10-28

## 2020-10-30 ENCOUNTER — TELEPHONE (OUTPATIENT)
Dept: NEUROLOGY | Facility: CLINIC | Age: 40
End: 2020-10-30

## 2020-10-30 DIAGNOSIS — G35 MS (MULTIPLE SCLEROSIS): Primary | ICD-10-CM

## 2020-11-02 ENCOUNTER — PATIENT MESSAGE (OUTPATIENT)
Dept: PSYCHIATRY | Facility: CLINIC | Age: 40
End: 2020-11-02

## 2020-11-04 ENCOUNTER — PATIENT MESSAGE (OUTPATIENT)
Dept: NEUROLOGY | Facility: CLINIC | Age: 40
End: 2020-11-04

## 2020-11-18 ENCOUNTER — PATIENT MESSAGE (OUTPATIENT)
Dept: NEUROLOGY | Facility: CLINIC | Age: 40
End: 2020-11-18

## 2020-11-18 RX ORDER — CALCIUM CARBONATE 160(400)MG
1 TABLET,CHEWABLE ORAL ONCE
Qty: 1 EACH | Refills: 0 | Status: SHIPPED | OUTPATIENT
Start: 2020-11-18 | End: 2020-11-18

## 2021-02-05 ENCOUNTER — PATIENT MESSAGE (OUTPATIENT)
Dept: NEUROLOGY | Facility: CLINIC | Age: 41
End: 2021-02-05

## 2021-02-17 ENCOUNTER — HOSPITAL ENCOUNTER (EMERGENCY)
Facility: HOSPITAL | Age: 41
Discharge: HOME | End: 2021-02-18
Attending: EMERGENCY MEDICINE
Payer: MEDICAID

## 2021-02-17 ENCOUNTER — TELEPHONE (OUTPATIENT)
Dept: NEUROLOGY | Facility: CLINIC | Age: 41
End: 2021-02-17

## 2021-02-17 ENCOUNTER — PATIENT MESSAGE (OUTPATIENT)
Dept: NEUROLOGY | Facility: CLINIC | Age: 41
End: 2021-02-17

## 2021-02-17 DIAGNOSIS — Z00.8 EVALUATION BY PSYCHIATRIC SERVICE REQUIRED: ICD-10-CM

## 2021-02-17 DIAGNOSIS — F32.A DEPRESSION, UNSPECIFIED DEPRESSION TYPE: Primary | ICD-10-CM

## 2021-02-17 DIAGNOSIS — G35 HISTORY OF MULTIPLE SCLEROSIS: ICD-10-CM

## 2021-02-17 LAB
ALBUMIN SERPL BCP-MCNC: 4.8 G/DL (ref 3.5–5.2)
ALP SERPL-CCNC: 65 U/L (ref 55–135)
ALT SERPL W/O P-5'-P-CCNC: 17 U/L (ref 10–44)
ANION GAP SERPL CALC-SCNC: 10 MMOL/L (ref 8–16)
AST SERPL-CCNC: 15 U/L (ref 10–40)
BASOPHILS # BLD AUTO: 0.08 K/UL (ref 0–0.2)
BASOPHILS NFR BLD: 1.2 % (ref 0–1.9)
BILIRUB SERPL-MCNC: 1.3 MG/DL (ref 0.1–1)
BILIRUB UR QL STRIP: NEGATIVE
BUN SERPL-MCNC: 11 MG/DL (ref 6–20)
CALCIUM SERPL-MCNC: 9.7 MG/DL (ref 8.7–10.5)
CHLORIDE SERPL-SCNC: 100 MMOL/L (ref 95–110)
CLARITY UR: CLEAR
CO2 SERPL-SCNC: 26 MMOL/L (ref 23–29)
COLOR UR: YELLOW
CREAT SERPL-MCNC: 1.1 MG/DL (ref 0.5–1.4)
DIFFERENTIAL METHOD: ABNORMAL
EOSINOPHIL # BLD AUTO: 0.2 K/UL (ref 0–0.5)
EOSINOPHIL NFR BLD: 3.1 % (ref 0–8)
ERYTHROCYTE [DISTWIDTH] IN BLOOD BY AUTOMATED COUNT: 11.4 % (ref 11.5–14.5)
EST. GFR  (AFRICAN AMERICAN): >60 ML/MIN/1.73 M^2
EST. GFR  (NON AFRICAN AMERICAN): >60 ML/MIN/1.73 M^2
ETHANOL SERPL-MCNC: <10 MG/DL
GLUCOSE SERPL-MCNC: 84 MG/DL (ref 70–110)
GLUCOSE UR QL STRIP: NEGATIVE
HCT VFR BLD AUTO: 48 % (ref 40–54)
HCV AB SERPL QL IA: NEGATIVE
HGB BLD-MCNC: 16.9 G/DL (ref 14–18)
HGB UR QL STRIP: NEGATIVE
HIV 1+2 AB+HIV1 P24 AG SERPL QL IA: NEGATIVE
IMM GRANULOCYTES # BLD AUTO: 0.02 K/UL (ref 0–0.04)
IMM GRANULOCYTES NFR BLD AUTO: 0.3 % (ref 0–0.5)
KETONES UR QL STRIP: NEGATIVE
LEUKOCYTE ESTERASE UR QL STRIP: NEGATIVE
LYMPHOCYTES # BLD AUTO: 2 K/UL (ref 1–4.8)
LYMPHOCYTES NFR BLD: 31.1 % (ref 18–48)
MCH RBC QN AUTO: 31.4 PG (ref 27–31)
MCHC RBC AUTO-ENTMCNC: 35.2 G/DL (ref 32–36)
MCV RBC AUTO: 89 FL (ref 82–98)
MONOCYTES # BLD AUTO: 0.6 K/UL (ref 0.3–1)
MONOCYTES NFR BLD: 9.1 % (ref 4–15)
NEUTROPHILS # BLD AUTO: 3.6 K/UL (ref 1.8–7.7)
NEUTROPHILS NFR BLD: 55.2 % (ref 38–73)
NITRITE UR QL STRIP: NEGATIVE
NRBC BLD-RTO: 0 /100 WBC
PH UR STRIP: 7 [PH] (ref 5–8)
PLATELET # BLD AUTO: 231 K/UL (ref 150–350)
PMV BLD AUTO: 10.1 FL (ref 9.2–12.9)
POTASSIUM SERPL-SCNC: 3.9 MMOL/L (ref 3.5–5.1)
PROT SERPL-MCNC: 8.1 G/DL (ref 6–8.4)
PROT UR QL STRIP: NEGATIVE
RBC # BLD AUTO: 5.39 M/UL (ref 4.6–6.2)
SARS-COV-2 RDRP RESP QL NAA+PROBE: NEGATIVE
SODIUM SERPL-SCNC: 136 MMOL/L (ref 136–145)
SP GR UR STRIP: 1.01 (ref 1–1.03)
TSH SERPL DL<=0.005 MIU/L-ACNC: 1.03 UIU/ML (ref 0.4–4)
URN SPEC COLLECT METH UR: NORMAL
UROBILINOGEN UR STRIP-ACNC: NEGATIVE EU/DL
WBC # BLD AUTO: 6.5 K/UL (ref 3.9–12.7)

## 2021-02-17 PROCEDURE — 86703 HIV-1/HIV-2 1 RESULT ANTBDY: CPT

## 2021-02-17 PROCEDURE — 86803 HEPATITIS C AB TEST: CPT

## 2021-02-17 PROCEDURE — 36415 COLL VENOUS BLD VENIPUNCTURE: CPT

## 2021-02-17 PROCEDURE — 85025 COMPLETE CBC W/AUTO DIFF WBC: CPT

## 2021-02-17 PROCEDURE — 80307 DRUG TEST PRSMV CHEM ANLYZR: CPT

## 2021-02-17 PROCEDURE — U0002 COVID-19 LAB TEST NON-CDC: HCPCS

## 2021-02-17 PROCEDURE — 99285 EMERGENCY DEPT VISIT HI MDM: CPT | Mod: 25

## 2021-02-17 PROCEDURE — 81003 URINALYSIS AUTO W/O SCOPE: CPT | Mod: 59

## 2021-02-17 PROCEDURE — 82077 ASSAY SPEC XCP UR&BREATH IA: CPT

## 2021-02-17 PROCEDURE — 84443 ASSAY THYROID STIM HORMONE: CPT

## 2021-02-17 PROCEDURE — 80053 COMPREHEN METABOLIC PANEL: CPT

## 2021-02-18 VITALS
HEIGHT: 73 IN | TEMPERATURE: 98 F | SYSTOLIC BLOOD PRESSURE: 132 MMHG | HEART RATE: 75 BPM | WEIGHT: 165 LBS | BODY MASS INDEX: 21.87 KG/M2 | OXYGEN SATURATION: 100 % | DIASTOLIC BLOOD PRESSURE: 86 MMHG | RESPIRATION RATE: 17 BRPM

## 2021-02-18 LAB
AMPHET+METHAMPHET UR QL: NEGATIVE
BARBITURATES UR QL SCN>200 NG/ML: NEGATIVE
BENZODIAZ UR QL SCN>200 NG/ML: NEGATIVE
BZE UR QL SCN: NEGATIVE
CANNABINOIDS UR QL SCN: NEGATIVE
CREAT UR-MCNC: 45 MG/DL (ref 23–375)
METHADONE UR QL SCN>300 NG/ML: NEGATIVE
OPIATES UR QL SCN: NEGATIVE
PCP UR QL SCN>25 NG/ML: NEGATIVE
TOXICOLOGY INFORMATION: NORMAL

## 2021-02-18 PROCEDURE — 99205 PR OFFICE/OUTPT VISIT, NEW, LEVL V, 60-74 MIN: ICD-10-PCS | Mod: 95,AF,HB, | Performed by: PSYCHIATRY & NEUROLOGY

## 2021-02-18 PROCEDURE — 99205 OFFICE O/P NEW HI 60 MIN: CPT | Mod: 95,AF,HB, | Performed by: PSYCHIATRY & NEUROLOGY

## 2021-02-22 ENCOUNTER — DOCUMENTATION ONLY (OUTPATIENT)
Dept: REHABILITATION | Facility: HOSPITAL | Age: 41
End: 2021-02-22

## 2021-03-26 ENCOUNTER — PATIENT MESSAGE (OUTPATIENT)
Dept: NEUROLOGY | Facility: CLINIC | Age: 41
End: 2021-03-26

## 2021-04-05 ENCOUNTER — PATIENT MESSAGE (OUTPATIENT)
Dept: NEUROLOGY | Facility: CLINIC | Age: 41
End: 2021-04-05

## 2021-04-28 ENCOUNTER — PATIENT MESSAGE (OUTPATIENT)
Dept: RESEARCH | Facility: HOSPITAL | Age: 41
End: 2021-04-28

## 2021-05-19 ENCOUNTER — PATIENT MESSAGE (OUTPATIENT)
Dept: NEUROLOGY | Facility: CLINIC | Age: 41
End: 2021-05-19

## 2021-05-31 ENCOUNTER — PATIENT MESSAGE (OUTPATIENT)
Dept: PSYCHIATRY | Facility: CLINIC | Age: 41
End: 2021-05-31

## 2021-07-11 ENCOUNTER — PATIENT MESSAGE (OUTPATIENT)
Dept: INTERNAL MEDICINE | Facility: CLINIC | Age: 41
End: 2021-07-11

## 2021-07-28 ENCOUNTER — OFFICE VISIT (OUTPATIENT)
Dept: INTERNAL MEDICINE | Facility: CLINIC | Age: 41
End: 2021-07-28
Payer: MEDICARE

## 2021-07-28 ENCOUNTER — LAB VISIT (OUTPATIENT)
Dept: LAB | Facility: HOSPITAL | Age: 41
End: 2021-07-28
Attending: FAMILY MEDICINE
Payer: MEDICARE

## 2021-07-28 VITALS
HEIGHT: 73 IN | OXYGEN SATURATION: 96 % | HEART RATE: 62 BPM | SYSTOLIC BLOOD PRESSURE: 98 MMHG | DIASTOLIC BLOOD PRESSURE: 62 MMHG | BODY MASS INDEX: 21.98 KG/M2 | WEIGHT: 165.81 LBS | TEMPERATURE: 97 F

## 2021-07-28 DIAGNOSIS — Z00.00 ANNUAL PHYSICAL EXAM: Primary | ICD-10-CM

## 2021-07-28 DIAGNOSIS — G35 MS (MULTIPLE SCLEROSIS): ICD-10-CM

## 2021-07-28 DIAGNOSIS — Z00.00 ANNUAL PHYSICAL EXAM: ICD-10-CM

## 2021-07-28 PROCEDURE — 80061 LIPID PANEL: CPT | Performed by: FAMILY MEDICINE

## 2021-07-28 PROCEDURE — 99999 PR PBB SHADOW E&M-EST. PATIENT-LVL IV: ICD-10-PCS | Mod: PBBFAC,,, | Performed by: FAMILY MEDICINE

## 2021-07-28 PROCEDURE — 99213 PR OFFICE/OUTPT VISIT, EST, LEVL III, 20-29 MIN: ICD-10-PCS | Mod: S$PBB,,, | Performed by: FAMILY MEDICINE

## 2021-07-28 PROCEDURE — 99999 PR PBB SHADOW E&M-EST. PATIENT-LVL IV: CPT | Mod: PBBFAC,,, | Performed by: FAMILY MEDICINE

## 2021-07-28 PROCEDURE — 36415 COLL VENOUS BLD VENIPUNCTURE: CPT | Performed by: FAMILY MEDICINE

## 2021-07-28 PROCEDURE — 99213 OFFICE O/P EST LOW 20 MIN: CPT | Mod: S$PBB,,, | Performed by: FAMILY MEDICINE

## 2021-07-28 PROCEDURE — 99214 OFFICE O/P EST MOD 30 MIN: CPT | Mod: PBBFAC | Performed by: FAMILY MEDICINE

## 2021-07-29 LAB
CHOLEST SERPL-MCNC: 240 MG/DL (ref 120–199)
CHOLEST/HDLC SERPL: 6.7 {RATIO} (ref 2–5)
HDLC SERPL-MCNC: 36 MG/DL (ref 40–75)
HDLC SERPL: 15 % (ref 20–50)
LDLC SERPL CALC-MCNC: 134 MG/DL (ref 63–159)
NONHDLC SERPL-MCNC: 204 MG/DL
TRIGL SERPL-MCNC: 350 MG/DL (ref 30–150)

## 2021-08-28 ENCOUNTER — PATIENT MESSAGE (OUTPATIENT)
Dept: INTERNAL MEDICINE | Facility: CLINIC | Age: 41
End: 2021-08-28

## 2021-09-01 ENCOUNTER — PATIENT MESSAGE (OUTPATIENT)
Dept: PSYCHIATRY | Facility: CLINIC | Age: 41
End: 2021-09-01

## 2021-09-02 ENCOUNTER — PATIENT MESSAGE (OUTPATIENT)
Dept: PSYCHIATRY | Facility: CLINIC | Age: 41
End: 2021-09-02

## 2021-09-03 ENCOUNTER — DOCUMENTATION ONLY (OUTPATIENT)
Dept: INTERNAL MEDICINE | Facility: CLINIC | Age: 41
End: 2021-09-03

## 2021-09-10 ENCOUNTER — PATIENT MESSAGE (OUTPATIENT)
Dept: INTERNAL MEDICINE | Facility: CLINIC | Age: 41
End: 2021-09-10

## 2021-10-07 ENCOUNTER — PATIENT MESSAGE (OUTPATIENT)
Dept: INTERNAL MEDICINE | Facility: CLINIC | Age: 41
End: 2021-10-07

## 2021-10-08 DIAGNOSIS — G35 MS (MULTIPLE SCLEROSIS): Primary | ICD-10-CM

## 2021-10-29 ENCOUNTER — PATIENT MESSAGE (OUTPATIENT)
Dept: INTERNAL MEDICINE | Facility: CLINIC | Age: 41
End: 2021-10-29
Payer: MEDICARE

## 2021-10-29 ENCOUNTER — TELEPHONE (OUTPATIENT)
Dept: INTERNAL MEDICINE | Facility: CLINIC | Age: 41
End: 2021-10-29
Payer: MEDICARE

## 2021-11-10 ENCOUNTER — PATIENT MESSAGE (OUTPATIENT)
Dept: INTERNAL MEDICINE | Facility: CLINIC | Age: 41
End: 2021-11-10
Payer: MEDICARE

## 2021-11-10 ENCOUNTER — TELEPHONE (OUTPATIENT)
Dept: INTERNAL MEDICINE | Facility: CLINIC | Age: 41
End: 2021-11-10
Payer: MEDICARE

## 2021-11-16 ENCOUNTER — OFFICE VISIT (OUTPATIENT)
Dept: INTERNAL MEDICINE | Facility: CLINIC | Age: 41
End: 2021-11-16
Payer: MEDICAID

## 2021-11-16 DIAGNOSIS — G35 MS (MULTIPLE SCLEROSIS): Primary | ICD-10-CM

## 2021-11-16 PROCEDURE — 99212 OFFICE O/P EST SF 10 MIN: CPT | Mod: S$PBB,,, | Performed by: FAMILY MEDICINE

## 2021-11-16 PROCEDURE — 99212 PR OFFICE/OUTPT VISIT, EST, LEVL II, 10-19 MIN: ICD-10-PCS | Mod: S$PBB,,, | Performed by: FAMILY MEDICINE

## 2021-11-18 ENCOUNTER — TELEPHONE (OUTPATIENT)
Dept: PRIMARY CARE CLINIC | Facility: CLINIC | Age: 41
End: 2021-11-18
Payer: MEDICARE

## 2021-11-19 ENCOUNTER — TELEPHONE (OUTPATIENT)
Dept: INTERNAL MEDICINE | Facility: CLINIC | Age: 41
End: 2021-11-19
Payer: MEDICARE

## 2021-11-22 ENCOUNTER — TELEPHONE (OUTPATIENT)
Dept: INTERNAL MEDICINE | Facility: CLINIC | Age: 41
End: 2021-11-22
Payer: MEDICARE

## 2021-11-29 ENCOUNTER — PATIENT MESSAGE (OUTPATIENT)
Dept: INTERNAL MEDICINE | Facility: CLINIC | Age: 41
End: 2021-11-29
Payer: MEDICARE

## 2021-11-29 ENCOUNTER — TELEPHONE (OUTPATIENT)
Dept: INTERNAL MEDICINE | Facility: CLINIC | Age: 41
End: 2021-11-29
Payer: MEDICARE

## 2021-12-03 ENCOUNTER — OFFICE VISIT (OUTPATIENT)
Dept: INTERNAL MEDICINE | Facility: CLINIC | Age: 41
End: 2021-12-03
Payer: MEDICARE

## 2021-12-03 DIAGNOSIS — G35 MS (MULTIPLE SCLEROSIS): Primary | ICD-10-CM

## 2021-12-03 PROCEDURE — 99212 PR OFFICE/OUTPT VISIT, EST, LEVL II, 10-19 MIN: ICD-10-PCS | Mod: 95,,, | Performed by: FAMILY MEDICINE

## 2021-12-03 PROCEDURE — 99212 OFFICE O/P EST SF 10 MIN: CPT | Mod: 95,,, | Performed by: FAMILY MEDICINE

## 2021-12-04 PROCEDURE — G0180 PR HOME HEALTH MD CERTIFICATION: ICD-10-PCS | Mod: ,,, | Performed by: FAMILY MEDICINE

## 2021-12-04 PROCEDURE — G0180 MD CERTIFICATION HHA PATIENT: HCPCS | Mod: ,,, | Performed by: FAMILY MEDICINE

## 2021-12-06 ENCOUNTER — TELEPHONE (OUTPATIENT)
Dept: INTERNAL MEDICINE | Facility: CLINIC | Age: 41
End: 2021-12-06
Payer: MEDICARE

## 2021-12-21 ENCOUNTER — PATIENT MESSAGE (OUTPATIENT)
Dept: NEUROLOGY | Facility: CLINIC | Age: 41
End: 2021-12-21
Payer: MEDICARE

## 2021-12-22 ENCOUNTER — EXTERNAL HOME HEALTH (OUTPATIENT)
Dept: HOME HEALTH SERVICES | Facility: HOSPITAL | Age: 41
End: 2021-12-22
Payer: MEDICARE

## 2021-12-23 ENCOUNTER — TELEPHONE (OUTPATIENT)
Dept: INTERNAL MEDICINE | Facility: CLINIC | Age: 41
End: 2021-12-23
Payer: MEDICARE

## 2021-12-23 DIAGNOSIS — G35 MS (MULTIPLE SCLEROSIS): Primary | ICD-10-CM

## 2022-01-12 ENCOUNTER — DOCUMENT SCAN (OUTPATIENT)
Dept: HOME HEALTH SERVICES | Facility: HOSPITAL | Age: 42
End: 2022-01-12
Payer: MEDICARE

## 2022-02-02 PROCEDURE — G0179 PR HOME HEALTH MD RECERTIFICATION: ICD-10-PCS | Mod: ,,, | Performed by: FAMILY MEDICINE

## 2022-02-02 PROCEDURE — G0179 MD RECERTIFICATION HHA PT: HCPCS | Mod: ,,, | Performed by: FAMILY MEDICINE

## 2022-02-14 ENCOUNTER — EXTERNAL HOME HEALTH (OUTPATIENT)
Dept: HOME HEALTH SERVICES | Facility: HOSPITAL | Age: 42
End: 2022-02-14
Payer: MEDICARE

## 2022-02-28 ENCOUNTER — PATIENT MESSAGE (OUTPATIENT)
Dept: PSYCHIATRY | Facility: CLINIC | Age: 42
End: 2022-02-28
Payer: MEDICARE

## 2022-05-28 ENCOUNTER — PATIENT MESSAGE (OUTPATIENT)
Dept: INTERNAL MEDICINE | Facility: CLINIC | Age: 42
End: 2022-05-28
Payer: MEDICARE

## 2022-06-02 DIAGNOSIS — G35 MS (MULTIPLE SCLEROSIS): Primary | ICD-10-CM

## 2022-06-02 DIAGNOSIS — Z79.899 ENCOUNTER FOR LONG-TERM (CURRENT) USE OF MEDICATIONS: ICD-10-CM

## 2022-06-02 DIAGNOSIS — Z12.5 SCREENING FOR PROSTATE CANCER: ICD-10-CM

## 2022-06-03 ENCOUNTER — LAB VISIT (OUTPATIENT)
Dept: LAB | Facility: HOSPITAL | Age: 42
End: 2022-06-03
Attending: FAMILY MEDICINE
Payer: MEDICARE

## 2022-06-03 DIAGNOSIS — G35 MS (MULTIPLE SCLEROSIS): ICD-10-CM

## 2022-06-03 DIAGNOSIS — Z79.899 ENCOUNTER FOR LONG-TERM (CURRENT) USE OF MEDICATIONS: ICD-10-CM

## 2022-06-03 LAB
ALBUMIN SERPL BCP-MCNC: 4.5 G/DL (ref 3.5–5.2)
ALP SERPL-CCNC: 50 U/L (ref 55–135)
ALT SERPL W/O P-5'-P-CCNC: 46 U/L (ref 10–44)
ANION GAP SERPL CALC-SCNC: 13 MMOL/L (ref 8–16)
AST SERPL-CCNC: 21 U/L (ref 10–40)
BASOPHILS # BLD AUTO: 0.07 K/UL (ref 0–0.2)
BASOPHILS NFR BLD: 1.2 % (ref 0–1.9)
BILIRUB SERPL-MCNC: 0.8 MG/DL (ref 0.1–1)
BUN SERPL-MCNC: 10 MG/DL (ref 6–20)
CALCIUM SERPL-MCNC: 9.8 MG/DL (ref 8.7–10.5)
CHLORIDE SERPL-SCNC: 102 MMOL/L (ref 95–110)
CO2 SERPL-SCNC: 25 MMOL/L (ref 23–29)
CREAT SERPL-MCNC: 1.2 MG/DL (ref 0.5–1.4)
CRP SERPL-MCNC: 3.6 MG/L (ref 0–8.2)
DIFFERENTIAL METHOD: ABNORMAL
EOSINOPHIL # BLD AUTO: 0.2 K/UL (ref 0–0.5)
EOSINOPHIL NFR BLD: 3.6 % (ref 0–8)
ERYTHROCYTE [DISTWIDTH] IN BLOOD BY AUTOMATED COUNT: 11.4 % (ref 11.5–14.5)
ERYTHROCYTE [SEDIMENTATION RATE] IN BLOOD BY WESTERGREN METHOD: 3 MM/HR (ref 0–10)
EST. GFR  (AFRICAN AMERICAN): >60 ML/MIN/1.73 M^2
EST. GFR  (NON AFRICAN AMERICAN): >60 ML/MIN/1.73 M^2
GLUCOSE SERPL-MCNC: 81 MG/DL (ref 70–110)
HCT VFR BLD AUTO: 46.3 % (ref 40–54)
HGB BLD-MCNC: 15.5 G/DL (ref 14–18)
IMM GRANULOCYTES # BLD AUTO: 0.02 K/UL (ref 0–0.04)
IMM GRANULOCYTES NFR BLD AUTO: 0.3 % (ref 0–0.5)
LYMPHOCYTES # BLD AUTO: 2.2 K/UL (ref 1–4.8)
LYMPHOCYTES NFR BLD: 38 % (ref 18–48)
MCH RBC QN AUTO: 31.1 PG (ref 27–31)
MCHC RBC AUTO-ENTMCNC: 33.5 G/DL (ref 32–36)
MCV RBC AUTO: 93 FL (ref 82–98)
MONOCYTES # BLD AUTO: 0.5 K/UL (ref 0.3–1)
MONOCYTES NFR BLD: 8.4 % (ref 4–15)
NEUTROPHILS # BLD AUTO: 2.8 K/UL (ref 1.8–7.7)
NEUTROPHILS NFR BLD: 48.5 % (ref 38–73)
NRBC BLD-RTO: 0 /100 WBC
PLATELET # BLD AUTO: 231 K/UL (ref 150–450)
PMV BLD AUTO: 11.1 FL (ref 9.2–12.9)
POTASSIUM SERPL-SCNC: 4.5 MMOL/L (ref 3.5–5.1)
PROT SERPL-MCNC: 7.5 G/DL (ref 6–8.4)
RBC # BLD AUTO: 4.99 M/UL (ref 4.6–6.2)
SODIUM SERPL-SCNC: 140 MMOL/L (ref 136–145)
T3 SERPL-MCNC: 99 NG/DL (ref 60–180)
T4 FREE SERPL-MCNC: 1.03 NG/DL (ref 0.71–1.51)
TSH SERPL DL<=0.005 MIU/L-ACNC: 1.67 UIU/ML (ref 0.4–4)
WBC # BLD AUTO: 5.84 K/UL (ref 3.9–12.7)

## 2022-06-03 PROCEDURE — 84439 ASSAY OF FREE THYROXINE: CPT | Performed by: FAMILY MEDICINE

## 2022-06-03 PROCEDURE — 36415 COLL VENOUS BLD VENIPUNCTURE: CPT | Performed by: FAMILY MEDICINE

## 2022-06-03 PROCEDURE — 86140 C-REACTIVE PROTEIN: CPT | Performed by: FAMILY MEDICINE

## 2022-06-03 PROCEDURE — 84443 ASSAY THYROID STIM HORMONE: CPT | Performed by: FAMILY MEDICINE

## 2022-06-03 PROCEDURE — 85651 RBC SED RATE NONAUTOMATED: CPT | Performed by: FAMILY MEDICINE

## 2022-06-03 PROCEDURE — 85025 COMPLETE CBC W/AUTO DIFF WBC: CPT | Performed by: FAMILY MEDICINE

## 2022-06-03 PROCEDURE — 80053 COMPREHEN METABOLIC PANEL: CPT | Performed by: FAMILY MEDICINE

## 2022-06-03 PROCEDURE — 84480 ASSAY TRIIODOTHYRONINE (T3): CPT | Performed by: FAMILY MEDICINE

## 2022-06-24 ENCOUNTER — PATIENT MESSAGE (OUTPATIENT)
Dept: PSYCHIATRY | Facility: CLINIC | Age: 42
End: 2022-06-24
Payer: MEDICARE

## 2022-07-03 NOTE — TELEPHONE ENCOUNTER
----- Message from Ronna Hancock sent at 9/27/2019  2:36 PM CDT -----  Regarding: Ampyra  Contact: [phone]  We have received an approval for the patient's Ampyra until 9/27/20 with a co-pay of $0.  The patient's plan requires the patient to fill brand name Ampyra, which is a limited distribution drug, and cannot be filled with [facility].  Please send a new prescription for Ampyra to [facility], which is listed in the patient's Epic profile.  Please send Ampyra as a NACHO 1.  This is the only way to get a paid claim through the insurance.  The patient has been notified.     To complete this in EPIC, the original order MUST be discontinued and re-typed as a new prescription with the updated pharmacy listed. Clicking reorder will continue to route the Rx to [facility], even if the pharmacy is changed. Please opt the patient out of [facility] when the BPA is fired.     - INR  Hold Eliquis - INR  Hold Eliquis resume  Eliquis resume  Eliquis See [facility] note fro m7/1.  - DNR/DNI  - Symptom-focused care - INR  Hold Eliquis - hold Eliquis  as INR 3 ,  can resume once INR <2 resume  Eliquis resume  Eliquis resume  Eliquis - INR  Hold Eliquis resume  Eliquis See GO note from 7/1.  - DNR/DNI  - Symptom-focused care - INR  Hold Eliquis - INR  Hold Eliquis resume  Eliquis - INR  Hold Eliquis

## 2022-08-12 ENCOUNTER — PATIENT MESSAGE (OUTPATIENT)
Dept: INTERNAL MEDICINE | Facility: CLINIC | Age: 42
End: 2022-08-12
Payer: MEDICARE

## 2022-08-17 ENCOUNTER — PATIENT MESSAGE (OUTPATIENT)
Dept: INTERNAL MEDICINE | Facility: CLINIC | Age: 42
End: 2022-08-17
Payer: MEDICARE

## 2022-08-17 DIAGNOSIS — G35 MS (MULTIPLE SCLEROSIS): Primary | ICD-10-CM

## 2022-12-01 ENCOUNTER — PATIENT MESSAGE (OUTPATIENT)
Dept: PSYCHIATRY | Facility: CLINIC | Age: 42
End: 2022-12-01
Payer: MEDICARE

## 2023-01-06 ENCOUNTER — OFFICE VISIT (OUTPATIENT)
Dept: INTERNAL MEDICINE | Facility: CLINIC | Age: 43
End: 2023-01-06
Payer: MEDICARE

## 2023-01-06 ENCOUNTER — LAB VISIT (OUTPATIENT)
Dept: LAB | Facility: HOSPITAL | Age: 43
End: 2023-01-06
Attending: NURSE PRACTITIONER
Payer: MEDICARE

## 2023-01-06 VITALS
HEIGHT: 73 IN | TEMPERATURE: 98 F | SYSTOLIC BLOOD PRESSURE: 110 MMHG | RESPIRATION RATE: 18 BRPM | WEIGHT: 172 LBS | HEART RATE: 66 BPM | OXYGEN SATURATION: 97 % | BODY MASS INDEX: 22.8 KG/M2 | DIASTOLIC BLOOD PRESSURE: 64 MMHG

## 2023-01-06 DIAGNOSIS — G35 MULTIPLE SCLEROSIS: Primary | ICD-10-CM

## 2023-01-06 DIAGNOSIS — G35 MULTIPLE SCLEROSIS: ICD-10-CM

## 2023-01-06 DIAGNOSIS — Z00.00 ANNUAL PHYSICAL EXAM: ICD-10-CM

## 2023-01-06 LAB
ALBUMIN SERPL BCP-MCNC: 4.4 G/DL (ref 3.5–5.2)
ALP SERPL-CCNC: 55 U/L (ref 55–135)
ALT SERPL W/O P-5'-P-CCNC: 27 U/L (ref 10–44)
ANION GAP SERPL CALC-SCNC: 15 MMOL/L (ref 8–16)
AST SERPL-CCNC: 19 U/L (ref 10–40)
BILIRUB SERPL-MCNC: 0.5 MG/DL (ref 0.1–1)
BUN SERPL-MCNC: 13 MG/DL (ref 6–20)
CALCIUM SERPL-MCNC: 9.9 MG/DL (ref 8.7–10.5)
CHLORIDE SERPL-SCNC: 101 MMOL/L (ref 95–110)
CO2 SERPL-SCNC: 24 MMOL/L (ref 23–29)
CREAT SERPL-MCNC: 1.1 MG/DL (ref 0.5–1.4)
CRP SERPL-MCNC: 12.7 MG/L (ref 0–8.2)
ERYTHROCYTE [SEDIMENTATION RATE] IN BLOOD BY WESTERGREN METHOD: 5 MM/HR (ref 0–10)
EST. GFR  (NO RACE VARIABLE): >60 ML/MIN/1.73 M^2
ESTIMATED AVG GLUCOSE: 94 MG/DL (ref 68–131)
GLUCOSE SERPL-MCNC: 79 MG/DL (ref 70–110)
HBA1C MFR BLD: 4.9 % (ref 4–5.6)
POTASSIUM SERPL-SCNC: 4.1 MMOL/L (ref 3.5–5.1)
PROT SERPL-MCNC: 7.8 G/DL (ref 6–8.4)
SODIUM SERPL-SCNC: 140 MMOL/L (ref 136–145)
T4 FREE SERPL-MCNC: 1.06 NG/DL (ref 0.71–1.51)
TSH SERPL DL<=0.005 MIU/L-ACNC: 1.38 UIU/ML (ref 0.4–4)

## 2023-01-06 PROCEDURE — 84439 ASSAY OF FREE THYROXINE: CPT | Performed by: NURSE PRACTITIONER

## 2023-01-06 PROCEDURE — 99213 OFFICE O/P EST LOW 20 MIN: CPT | Mod: PBBFAC | Performed by: NURSE PRACTITIONER

## 2023-01-06 PROCEDURE — 80053 COMPREHEN METABOLIC PANEL: CPT | Performed by: NURSE PRACTITIONER

## 2023-01-06 PROCEDURE — 99999 PR PBB SHADOW E&M-EST. PATIENT-LVL III: ICD-10-PCS | Mod: PBBFAC,,, | Performed by: NURSE PRACTITIONER

## 2023-01-06 PROCEDURE — 83036 HEMOGLOBIN GLYCOSYLATED A1C: CPT | Performed by: NURSE PRACTITIONER

## 2023-01-06 PROCEDURE — 85025 COMPLETE CBC W/AUTO DIFF WBC: CPT | Performed by: NURSE PRACTITIONER

## 2023-01-06 PROCEDURE — 99213 PR OFFICE/OUTPT VISIT, EST, LEVL III, 20-29 MIN: ICD-10-PCS | Mod: S$PBB,,, | Performed by: NURSE PRACTITIONER

## 2023-01-06 PROCEDURE — 36415 COLL VENOUS BLD VENIPUNCTURE: CPT | Performed by: NURSE PRACTITIONER

## 2023-01-06 PROCEDURE — 99213 OFFICE O/P EST LOW 20 MIN: CPT | Mod: S$PBB,,, | Performed by: NURSE PRACTITIONER

## 2023-01-06 PROCEDURE — 84443 ASSAY THYROID STIM HORMONE: CPT | Performed by: NURSE PRACTITIONER

## 2023-01-06 PROCEDURE — 85651 RBC SED RATE NONAUTOMATED: CPT | Performed by: NURSE PRACTITIONER

## 2023-01-06 PROCEDURE — 84153 ASSAY OF PSA TOTAL: CPT | Performed by: NURSE PRACTITIONER

## 2023-01-06 PROCEDURE — 99999 PR PBB SHADOW E&M-EST. PATIENT-LVL III: CPT | Mod: PBBFAC,,, | Performed by: NURSE PRACTITIONER

## 2023-01-06 PROCEDURE — 86140 C-REACTIVE PROTEIN: CPT | Performed by: NURSE PRACTITIONER

## 2023-01-06 NOTE — PROGRESS NOTES
Larry Hinds  01/06/2023  [id number]    Primary Doctor No  Patient Care Team:  Primary Doctor No as PCP - General          Visit Type:an urgent visit for a new problem    Chief Complaint:  Chief Complaint   Patient presents with    Annual Exam       History of Present Illness:    42 year old male presents with hx of multiple sclerosis and is requesting labs for travel. Pt states he is going to [facility] [place] for stem cell injections to help improve his condition.  Patient does not have any complaints at this time.      History:  Past Medical History:   Diagnosis Date    Anxiety     Multiple sclerosis     Transverse myelitis      No past surgical history on file.  Family History   Problem Relation Age of Onset    Cancer Mother     Stroke Father     Cancer Father     Glaucoma Father     Cancer Maternal Grandmother     Cancer Maternal Grandfather      Social History     Socioeconomic History    Marital status: Single   Tobacco Use    Smoking status: Never    Smokeless tobacco: Never   Substance and Sexual Activity    Alcohol use: Yes     Comment: occasionally     Drug use: No     Patient Active Problem List   Diagnosis    MS (multiple sclerosis)    Gait disturbance    Weakness of both lower extremities    Unable to ambulate    Quadriparesis    Secondary progressive multiple sclerosis     Review of patient's allergies indicates:  No Known Allergies    The following were reviewed at this visit: active problem list, medication list, allergies, family history, social history, and health maintenance.    Medications:  Current Outpatient Medications on File Prior to Visit   Medication Sig Dispense Refill    ascorbic acid, vitamin C, (VITAMIN C) 1000 MG tablet Take 1,000 mg by mouth.      b complex vitamins tablet Take 1 tablet by mouth once daily.      BIOTIN ORAL Take by mouth.      cholecalciferol, vitamin D3, 10,000 unit Tab Take by mouth.       coenzyme Q10 10 mg capsule Take 10 mg by mouth once daily.      multivitamin  (THERAGRAN) per tablet Take 1 tablet by mouth.      omega-3 acid ethyl esters (LOVAZA) 1 gram capsule Take 1 g by mouth.      TURMERIC ORAL Take 1 tablet by mouth once daily.      cyanocobalamin (VITAMIN B-12) 1000 MCG tablet Take 1,000 mcg by mouth.      metFORMIN (GLUCOPHAGE) 500 MG tablet Take 1 tablet (500 mg total) by mouth 2 (two) times daily with meals. (Patient not taking: Reported on 7/28/2021) 180 tablet 11    OLIVE LEAF EXTRACT ORAL Take by mouth.       No current facility-administered medications on file prior to visit.       Medications have been reviewed and reconciled with patient at this visit.  Barriers to medications reviewed with patient.    Adverse reactions to current medications reviewed with patient..    Over the counter medications reviewed and reconciled with patient.    Exam:  Wt Readings from Last 3 Encounters:   01/06/23 78 kg (172 lb)   07/28/21 75.2 kg (165 lb 12.6 oz)   02/17/21 74.8 kg (165 lb)     Temp Readings from Last 3 Encounters:   01/06/23 98.1 °F (36.7 °C) (Temporal)   07/28/21 97 °F (36.1 °C) (Tympanic)   02/18/21 98.3 °F (36.8 °C) (Oral)     BP Readings from Last 3 Encounters:   01/06/23 110/64   07/28/21 98/62   02/18/21 132/86     Pulse Readings from Last 3 Encounters:   01/06/23 66   07/28/21 62   02/18/21 75     Body mass index is 22.69 kg/m².      Review of Systems   All other systems reviewed and are negative.  Physical Exam  Vitals and nursing note reviewed.   Constitutional:       Appearance: Normal appearance. He is normal weight.   HENT:      Head: Normocephalic and atraumatic.      Right Ear: Tympanic membrane, ear canal and external ear normal.      Left Ear: Tympanic membrane, ear canal and external ear normal.      Nose: Nose normal.      Mouth/Throat:      Mouth: Mucous membranes are moist.      Pharynx: Oropharynx is clear.   Eyes:      Extraocular Movements: Extraocular movements intact.      Conjunctiva/sclera: Conjunctivae normal.      Pupils: Pupils are  equal, round, and reactive to light.   Cardiovascular:      Rate and Rhythm: Normal rate and regular rhythm.   Pulmonary:      Effort: Pulmonary effort is normal.      Breath sounds: Normal breath sounds.   Abdominal:      General: Abdomen is flat.      Palpations: Abdomen is soft.   Musculoskeletal:      Cervical back: Normal range of motion and neck supple.      Comments: Patient is wheelchair-bound   Skin:     General: Skin is warm and dry.      Capillary Refill: Capillary refill takes less than 2 seconds.   Neurological:      General: No focal deficit present.      Mental Status: He is alert.   Psychiatric:         Mood and Affect: Mood normal.         Behavior: Behavior normal.         Thought Content: Thought content normal.         Judgment: Judgment normal.       Laboratory Reviewed ({Yes)  Lab Results   Component Value Date    WBC 5.84 06/03/2022    HGB 15.5 06/03/2022    HCT 46.3 06/03/2022     06/03/2022    CHOL 240 (H) 07/28/2021    TRIG 350 (H) 07/28/2021    HDL 36 (L) 07/28/2021    ALT 46 (H) 06/03/2022    AST 21 06/03/2022     06/03/2022    K 4.5 06/03/2022     06/03/2022    CREATININE 1.2 06/03/2022    BUN 10 06/03/2022    CO2 25 06/03/2022    TSH 1.666 06/03/2022       Larry was seen today for annual exam.    Diagnoses and all orders for this visit:    Multiple sclerosis  -     T4, Free; Future  -     TSH; Future  -     Hemoglobin A1C; Future  -     Comprehensive Metabolic Panel; Future  -     CBC Auto Differential; Future  -     Sedimentation rate; Future  -     C-REACTIVE PROTEIN; Future  -     PSA, SCREENING; Future    Annual physical exam      Care Plan/Goals: Reviewed    Goals    None     Larry was seen today for annual exam.    Diagnoses and all orders for this visit:    Multiple sclerosis  -     T4, Free; Future  -     TSH; Future  -     Hemoglobin A1C; Future  -     Comprehensive Metabolic Panel; Future  -     CBC Auto Differential; Future  -     Sedimentation rate;  Future  -     C-REACTIVE PROTEIN; Future  -     PSA, SCREENING; Future    Annual physical exam         Follow up: No follow-ups on file.    After visit summary was printed and given to patient upon discharge today.  Patient goals and care plan are included in After Visit Summary.

## 2023-01-07 LAB
BASOPHILS # BLD AUTO: 0.08 K/UL (ref 0–0.2)
BASOPHILS NFR BLD: 1.1 % (ref 0–1.9)
COMPLEXED PSA SERPL-MCNC: 0.65 NG/ML (ref 0–4)
DIFFERENTIAL METHOD: ABNORMAL
EOSINOPHIL # BLD AUTO: 0.2 K/UL (ref 0–0.5)
EOSINOPHIL NFR BLD: 3.3 % (ref 0–8)
ERYTHROCYTE [DISTWIDTH] IN BLOOD BY AUTOMATED COUNT: 11.3 % (ref 11.5–14.5)
HCT VFR BLD AUTO: 47.1 % (ref 40–54)
HGB BLD-MCNC: 15.6 G/DL (ref 14–18)
IMM GRANULOCYTES # BLD AUTO: 0.03 K/UL (ref 0–0.04)
IMM GRANULOCYTES NFR BLD AUTO: 0.4 % (ref 0–0.5)
LYMPHOCYTES # BLD AUTO: 2.1 K/UL (ref 1–4.8)
LYMPHOCYTES NFR BLD: 28.2 % (ref 18–48)
MCH RBC QN AUTO: 31.5 PG (ref 27–31)
MCHC RBC AUTO-ENTMCNC: 33.1 G/DL (ref 32–36)
MCV RBC AUTO: 95 FL (ref 82–98)
MONOCYTES # BLD AUTO: 0.6 K/UL (ref 0.3–1)
MONOCYTES NFR BLD: 8.7 % (ref 4–15)
NEUTROPHILS # BLD AUTO: 4.3 K/UL (ref 1.8–7.7)
NEUTROPHILS NFR BLD: 58.3 % (ref 38–73)
NRBC BLD-RTO: 0 /100 WBC
PLATELET # BLD AUTO: 254 K/UL (ref 150–450)
PMV BLD AUTO: 10.2 FL (ref 9.2–12.9)
RBC # BLD AUTO: 4.96 M/UL (ref 4.6–6.2)
WBC # BLD AUTO: 7.33 K/UL (ref 3.9–12.7)

## 2023-01-09 NOTE — PROGRESS NOTES
I have reviewed your labs and your CRP is elevated which is representative of any autoimmune disorders. No new orders at this time.

## 2023-01-12 ENCOUNTER — PATIENT MESSAGE (OUTPATIENT)
Dept: INTERNAL MEDICINE | Facility: CLINIC | Age: 43
End: 2023-01-12
Payer: MEDICARE

## 2023-02-20 ENCOUNTER — PATIENT MESSAGE (OUTPATIENT)
Dept: PSYCHIATRY | Facility: CLINIC | Age: 43
End: 2023-02-20
Payer: MEDICARE

## 2023-04-24 ENCOUNTER — PATIENT MESSAGE (OUTPATIENT)
Dept: NEUROLOGY | Facility: CLINIC | Age: 43
End: 2023-04-24
Payer: MEDICARE

## 2023-05-24 ENCOUNTER — TELEPHONE (OUTPATIENT)
Dept: INTERNAL MEDICINE | Facility: CLINIC | Age: 43
End: 2023-05-24
Payer: MEDICARE

## 2023-05-24 NOTE — TELEPHONE ENCOUNTER
----- Message from [place] sent at 5/24/2023 10:50 AM CDT -----  Contact: Jeri/Therapist  Jeri with Occupation therapy is calling to speak with the nurse to get orders for inpatient therapy sent over. Please give Jeri a callback at [phone].

## 2023-05-24 NOTE — TELEPHONE ENCOUNTER
Occupational therapist with [facility]. Physically, the patient has had a major setback. Today he has gotten a hospital bed. As a team, the therapists believe that he may benefit more from inpatient therapy. Emotionally, he is trending downward.      Rehab is having a liaison coming on Thursday to talk with the patient. They are needing an order from PCP to get him admitted. Would you sign that order?     Reasons for a couple weeks of IP Therapy:     New wheelchair  New bed  Transferring while bathing  Mental Health

## 2023-05-29 ENCOUNTER — TELEPHONE (OUTPATIENT)
Dept: FAMILY MEDICINE | Facility: CLINIC | Age: 43
End: 2023-05-29
Payer: MEDICARE

## 2023-05-29 NOTE — TELEPHONE ENCOUNTER
Appointment scheduled prior to sign order for rehab and other home use order. Patient verbally agreed after informed.

## 2023-05-30 ENCOUNTER — TELEPHONE (OUTPATIENT)
Dept: INTERNAL MEDICINE | Facility: CLINIC | Age: 43
End: 2023-05-30
Payer: MEDICARE

## 2023-05-30 NOTE — TELEPHONE ENCOUNTER
----- Message from Nani Lilly sent at 5/30/2023  7:21 AM CDT -----  Tania with [facility] would like a call back at [phone]. Thx. EL

## 2023-06-01 ENCOUNTER — OFFICE VISIT (OUTPATIENT)
Dept: INTERNAL MEDICINE | Facility: CLINIC | Age: 43
End: 2023-06-01
Payer: MEDICARE

## 2023-06-01 DIAGNOSIS — G35 MULTIPLE SCLEROSIS: Primary | ICD-10-CM

## 2023-06-01 PROCEDURE — 99213 OFFICE O/P EST LOW 20 MIN: CPT | Mod: 95,,, | Performed by: FAMILY MEDICINE

## 2023-06-01 PROCEDURE — 99213 PR OFFICE/OUTPT VISIT, EST, LEVL III, 20-29 MIN: ICD-10-PCS | Mod: 95,,, | Performed by: FAMILY MEDICINE

## 2023-06-01 NOTE — PROGRESS NOTES
Subjective:       Patient ID: Larry Hinds is a 42 y.o. male.    Chief Complaint: No chief complaint on file.    HPI  The patient location is: home  The chief complaint leading to consultation is: ms    Visit type: audiovisual    Face to Face time with patient: 10   minutes of total time spent on the encounter, which includes face to face time and non-face to face time preparing to see the patient (eg, review of tests), Obtaining and/or reviewing separately obtained history, Documenting clinical information in the electronic or other health record, Independently interpreting results (not separately reported) and communicating results to the patient/family/caregiver, or Care coordination (not separately reported).         Each patient to whom he or she provides medical services by telemedicine is:  (1) informed of the relationship between the physician and patient and the respective role of any other health care provider with respect to management of the patient; and (2) notified that he or she may decline to receive medical services by telemedicine and may withdraw from such care at any time.    Notes:       Patient Active Problem List   Diagnosis    MS (multiple sclerosis)    Gait disturbance    Weakness of both lower extremities    Unable to ambulate    Quadriparesis    Secondary progressive multiple sclerosis       Past Medical History:   Diagnosis Date    Anxiety     Multiple sclerosis     Transverse myelitis        No past surgical history on file.    Family History   Problem Relation Age of Onset    Cancer Mother     Stroke Father     Cancer Father     Glaucoma Father     Cancer Maternal Grandmother     Cancer Maternal Grandfather        Social History     Tobacco Use   Smoking Status Never   Smokeless Tobacco Never       Wt Readings from Last 5 Encounters:   01/06/23 78 kg (172 lb)   07/28/21 75.2 kg (165 lb 12.6 oz)   02/17/21 74.8 kg (165 lb)   09/25/20 78.4 kg (172 lb 13.5 oz)   06/08/20 79.4 kg (175 lb)        For further HPI details, see assessment and plan.    Review of Systems    Objective:      There were no vitals filed for this visit.    Physical Exam  Constitutional:       General: He is not in acute distress.     Appearance: He is not ill-appearing.      Comments: In bed   Pulmonary:      Effort: Pulmonary effort is normal. No respiratory distress.   Neurological:      General: No focal deficit present.      Mental Status: He is alert.   Psychiatric:         Mood and Affect: Mood normal.         Behavior: Behavior normal.       Assessment:       1. Multiple sclerosis        Plan:   Multiple sclerosis  -     Ambulatory referral/consult to Physical Medicine Rehab; Future; Expected date: 06/08/2023  -     HOSPITAL BED FOR HOME USE  -     Ambulatory referral/consult to Neurology; Future; Expected date: 06/08/2023    Multiple sclerosis  Chronic medical problem.  Progressing.  Unfortunately it sounds as if things have been progressive and he has declined somewhat.  He is working with outpatient physical therapy.  At present it appears they recommended inpatient services.  Will prescribe such and I have signed orders accordingly.    Additionally given his decline he is required the assistance of a medical bed.  Placing order for that medical supply as well.    Has had issues getting in with his neurologist.  Scheduling has been difficult.  We will try to get him an external neurologist.  Placing an order for the [facility] in asking the staff to help arrange    Check out note for staff:  Please coordinate with the patient to obtain his medical bed prescription.  I have just place the order.  Additionally I have placed an external consult for rehabilitation.  Please attach it to his rehab signed paperwork.      I have placed an external consult for him to see Neurology at the [facility].  Please help coordinate this with him as well.  Please set up a follow-up visit with me in roughly 3  months    This note was verbally dictated, please excuse any type errors.

## 2023-06-05 ENCOUNTER — TELEPHONE (OUTPATIENT)
Dept: INTERNAL MEDICINE | Facility: CLINIC | Age: 43
End: 2023-06-05
Payer: MEDICARE

## 2023-06-05 NOTE — TELEPHONE ENCOUNTER
----- Message from Eric Prasad sent at 6/5/2023  2:16 PM CDT -----  Contact: [facility]  Lynnezoerosalinda would a like a referral  for PT and OT evaluation for day treatment program  faxed over. (fax# [phone]). you can reach her @ [phone].

## 2023-06-05 NOTE — TELEPHONE ENCOUNTER
----- Message from Lamonte Corado sent at 6/5/2023  3:16 PM CDT -----  Contact: darien/ [facility]  Darien is requesting signed orders for [facility] day treatment. Call back # [phone]            Thanks  DD

## 2023-06-05 NOTE — TELEPHONE ENCOUNTER
[facility] requesting a PT/OT order. Please advise.    Note:   [facility]   fax: [phone]  Ph: [phone].

## 2023-06-14 ENCOUNTER — TELEPHONE (OUTPATIENT)
Dept: INTERNAL MEDICINE | Facility: CLINIC | Age: 43
End: 2023-06-14
Payer: MEDICARE

## 2023-06-14 NOTE — TELEPHONE ENCOUNTER
----- Message from Valeri Baldwin sent at 6/14/2023 10:04 AM CDT -----  Contact: Elizabeth/ [facility] needs a call back at [phone], Regards to getting the order signed and fax back to [phone] so the patient  can start his day treatment.    Thanks  Td

## 2023-06-15 ENCOUNTER — TELEPHONE (OUTPATIENT)
Dept: INTERNAL MEDICINE | Facility: CLINIC | Age: 43
End: 2023-06-15
Payer: MEDICARE

## 2023-06-15 NOTE — TELEPHONE ENCOUNTER
----- Message from Jeaneth Ferris sent at 6/15/2023 12:25 PM CDT -----  Contact: [facility]  Type:  Patient Returning Call    Who Called:Elizabeth  Who Left Message for Patient:unknown  Does the patient know what this is regarding?:unknown  Would the patient rather a call back or a response via [facility]? call  Best Call Back Number:[phone]  Additional Information: Reports missing a call and request Ms. Sanchez is given a call to assist.   Thank you,  GH

## 2023-06-16 ENCOUNTER — TELEPHONE (OUTPATIENT)
Dept: INTERNAL MEDICINE | Facility: CLINIC | Age: 43
End: 2023-06-16
Payer: MEDICARE

## 2023-06-16 NOTE — TELEPHONE ENCOUNTER
----- Message from Yessenia Nath sent at 6/16/2023  1:09 PM CDT -----  Contact: Jer[facility]  Jeri would like a call back at [phone], in regards to needing an order sent over to the outpatient program at [facility], fax at [phone].  Thanks   Am

## 2023-06-27 ENCOUNTER — PATIENT MESSAGE (OUTPATIENT)
Dept: INTERNAL MEDICINE | Facility: CLINIC | Age: 43
End: 2023-06-27
Payer: MEDICARE

## 2023-07-11 DIAGNOSIS — G35 PROGRESSIVE MULTIPLE SCLEROSIS: Primary | ICD-10-CM

## 2023-07-11 NOTE — PROGRESS NOTES
Subjective:       Patient ID: Larry Hinds is a 43 y.o. male.    Chief Complaint: No chief complaint on file.    HPI    43     Patient Active Problem List   Diagnosis    MS (multiple sclerosis)    Gait disturbance    Weakness of both lower extremities    Unable to ambulate    Quadriparesis    Secondary progressive multiple sclerosis       Past Medical History:   Diagnosis Date    Anxiety     Multiple sclerosis     Transverse myelitis        No past surgical history on file.    Family History   Problem Relation Age of Onset    Cancer Mother     Stroke Father     Cancer Father     Glaucoma Father     Cancer Maternal Grandmother     Cancer Maternal Grandfather        Social History     Tobacco Use   Smoking Status Never   Smokeless Tobacco Never       Wt Readings from Last 5 Encounters:   01/06/23 78 kg (172 lb)   07/28/21 75.2 kg (165 lb 12.6 oz)   02/17/21 74.8 kg (165 lb)   09/25/20 78.4 kg (172 lb 13.5 oz)   06/08/20 79.4 kg (175 lb)       Called to discuss wheelchair.  Needs prescriptions for power chair his progressive multiple sclerosis.  Custom [employer] chair requested.    Current chair not sufficient as patient is developing increased pain in his back and concern of pressure ulcers.      This note was verbally dictated, please excuse any type errors.

## 2023-07-12 ENCOUNTER — TELEPHONE (OUTPATIENT)
Dept: INTERNAL MEDICINE | Facility: CLINIC | Age: 43
End: 2023-07-12
Payer: MEDICARE

## 2023-07-12 NOTE — TELEPHONE ENCOUNTER
----- Message from Lucille Goel sent at 7/12/2023  3:21 PM CDT -----  Regarding: Custom Wheelchair  Per notes on order, this order is meant for [employer], not [facility].

## 2023-07-21 ENCOUNTER — PATIENT MESSAGE (OUTPATIENT)
Dept: PSYCHIATRY | Facility: CLINIC | Age: 43
End: 2023-07-21
Payer: MEDICARE

## 2023-07-25 ENCOUNTER — PES CALL (OUTPATIENT)
Dept: ADMINISTRATIVE | Facility: CLINIC | Age: 43
End: 2023-07-25
Payer: MEDICARE

## 2023-07-26 ENCOUNTER — PATIENT MESSAGE (OUTPATIENT)
Dept: NEUROLOGY | Facility: CLINIC | Age: 43
End: 2023-07-26
Payer: MEDICARE

## 2023-08-15 ENCOUNTER — PATIENT MESSAGE (OUTPATIENT)
Dept: INTERNAL MEDICINE | Facility: CLINIC | Age: 43
End: 2023-08-15
Payer: MEDICARE

## 2023-09-06 ENCOUNTER — TELEPHONE (OUTPATIENT)
Dept: INTERNAL MEDICINE | Facility: CLINIC | Age: 43
End: 2023-09-06
Payer: MEDICARE

## 2023-09-06 NOTE — TELEPHONE ENCOUNTER
Spoke with Claudine. Informed papers received but it will be address once Dr. Sahni comes back next week. Verbally agreed.

## 2023-09-06 NOTE — TELEPHONE ENCOUNTER
----- Message from Sophie Matute sent at 9/6/2023  9:50 AM CDT -----  Contact: Loan Page is calling in regards to the status of paperwork that was sent over for patient. Please call her back at [phone]. Thanks KB      Discharged

## 2023-09-12 ENCOUNTER — TELEPHONE (OUTPATIENT)
Dept: INTERNAL MEDICINE | Facility: CLINIC | Age: 43
End: 2023-09-12
Payer: MEDICARE

## 2023-09-12 NOTE — TELEPHONE ENCOUNTER
Your fax has been successfully sent to [phone] at [id number].  ------------------------------------------------------------  From: [id number]  ------------------------------------------------------------  9/12/2023 4:52:14 PM Transmission Record   Sent to [phone] with remote ID "   Result: (0/339;0/0) Success   Page record: 1 - 11   Elapsed time: 07:27 on channel 64    Your fax has been successfully sent to [phone] at [phone].  ------------------------------------------------------------  From: [id number]  ------------------------------------------------------------  9/12/2023 4:51:33 PM Transmission Record   Sent to [phone] with remote ID "   Result: (0/339;0/0) Success   Page record: 1 - 30   Elapsed time: 11:55 on channel 23

## 2023-09-27 ENCOUNTER — PATIENT MESSAGE (OUTPATIENT)
Dept: INTERNAL MEDICINE | Facility: CLINIC | Age: 43
End: 2023-09-27
Payer: MEDICARE

## 2023-09-28 DIAGNOSIS — G35 PROGRESSIVE MULTIPLE SCLEROSIS: Primary | ICD-10-CM

## 2023-10-02 ENCOUNTER — OFFICE VISIT (OUTPATIENT)
Dept: INTERNAL MEDICINE | Facility: CLINIC | Age: 43
End: 2023-10-02
Payer: MEDICARE

## 2023-10-02 ENCOUNTER — TELEPHONE (OUTPATIENT)
Dept: INTERNAL MEDICINE | Facility: CLINIC | Age: 43
End: 2023-10-02

## 2023-10-02 DIAGNOSIS — E78.5 HYPERLIPIDEMIA, UNSPECIFIED HYPERLIPIDEMIA TYPE: Primary | ICD-10-CM

## 2023-10-02 DIAGNOSIS — G35 PROGRESSIVE MULTIPLE SCLEROSIS: ICD-10-CM

## 2023-10-02 PROCEDURE — 99213 OFFICE O/P EST LOW 20 MIN: CPT | Mod: 95,,, | Performed by: FAMILY MEDICINE

## 2023-10-02 PROCEDURE — 99213 PR OFFICE/OUTPT VISIT, EST, LEVL III, 20-29 MIN: ICD-10-PCS | Mod: 95,,, | Performed by: FAMILY MEDICINE

## 2023-10-02 NOTE — TELEPHONE ENCOUNTER
----- Message from Ciaran Sahni MD sent at 10/2/2023 11:09 AM CDT -----  Lab work at his convenience - soonish please  Set up a 6 mo f/u

## 2023-10-02 NOTE — PROGRESS NOTES
Subjective:       Patient ID: Larry Hinds is a 43 y.o. male.    Chief Complaint: No chief complaint on file.    HPI    The patient location is: home  The chief complaint leading to consultation is: f/u    Visit type: audiovisual    Face to Face time with patient: 10   minutes of total time spent on the encounter, which includes face to face time and non-face to face time preparing to see the patient (eg, review of tests), Obtaining and/or reviewing separately obtained history, Documenting clinical information in the electronic or other health record, Independently interpreting results (not separately reported) and communicating results to the patient/family/caregiver, or Care coordination (not separately reported).         Each patient to whom he or she provides medical services by telemedicine is:  (1) informed of the relationship between the physician and patient and the respective role of any other health care provider with respect to management of the patient; and (2) notified that he or she may decline to receive medical services by telemedicine and may withdraw from such care at any time.    Notes:       43 M    Patient Active Problem List   Diagnosis    MS (multiple sclerosis)    Gait disturbance    Weakness of both lower extremities    Unable to ambulate    Quadriparesis    Secondary progressive multiple sclerosis       Past Medical History:   Diagnosis Date    Anxiety     Multiple sclerosis     Transverse myelitis        No past surgical history on file.    Family History   Problem Relation Age of Onset    Cancer Mother     Stroke Father     Cancer Father     Glaucoma Father     Cancer Maternal Grandmother     Cancer Maternal Grandfather        Social History     Tobacco Use   Smoking Status Never   Smokeless Tobacco Never       Wt Readings from Last 5 Encounters:   01/06/23 78 kg (172 lb)   07/28/21 75.2 kg (165 lb 12.6 oz)   02/17/21 74.8 kg (165 lb)   09/25/20 78.4 kg (172 lb 13.5 oz)   06/08/20 79.4  "kg (175 lb)       For further HPI details, see assessment and plan.    Review of Systems   Constitutional:  Negative for activity change and unexpected weight change.   HENT:  Negative for hearing loss, rhinorrhea and trouble swallowing.    Eyes:  Negative for discharge and visual disturbance.   Respiratory:  Negative for chest tightness and wheezing.    Cardiovascular:  Negative for chest pain and palpitations.   Gastrointestinal:  Negative for blood in stool, constipation, diarrhea and vomiting.   Genitourinary:  Negative for difficulty urinating and hematuria.   Musculoskeletal:  Negative for neck pain.   Neurological:  Negative for headaches.   Psychiatric/Behavioral:  Negative for dysphoric mood.        Objective:      There were no vitals filed for this visit.    Physical Exam  Constitutional:       General: He is not in acute distress.     Appearance: He is not ill-appearing.   Pulmonary:      Effort: Pulmonary effort is normal. No respiratory distress.   Neurological:      General: No focal deficit present.      Mental Status: He is alert.   Psychiatric:         Mood and Affect: Mood normal.         Behavior: Behavior normal.         Assessment:       No diagnosis found.    Plan:   There are no diagnoses linked to this encounter.    MS  - outpatient PT just stopped. Insurance "ran out " .  He did not find it is overly helpful either.  Upon his return from [place] he does want a returned to a specific PT group.  We will arrange when he returns.  - moving to [place] next week - but will be moving back/forth.  - He is seeing a "holistic doctor" up there he wants to follow.  Placed consult for [facility]  - he saw neuro there - pt reports no substantial changes - offered an injection therapy - but patient opted out.    - he was able to get hospital bed.   The one insurance offered was not to his liking - private bought a different one.    HLD  Recheck  Adjust accordingly  We will calculate ASCVD score " accordingly      Screening for colon cancer screening.  He wants to do this after returning from [place].    On no prescription medication  Immunization due  Declined by patient.    This note was verbally dictated, please excuse any type errors.

## 2023-12-30 ENCOUNTER — PATIENT MESSAGE (OUTPATIENT)
Dept: ADMINISTRATIVE | Facility: CLINIC | Age: 43
End: 2023-12-30
Payer: MEDICARE

## 2024-01-11 DIAGNOSIS — Z00.00 ENCOUNTER FOR MEDICARE ANNUAL WELLNESS EXAM: ICD-10-CM

## 2024-01-22 ENCOUNTER — PATIENT MESSAGE (OUTPATIENT)
Dept: PSYCHIATRY | Facility: CLINIC | Age: 44
End: 2024-01-22
Payer: MEDICARE

## 2024-04-22 ENCOUNTER — PATIENT MESSAGE (OUTPATIENT)
Dept: INTERNAL MEDICINE | Facility: CLINIC | Age: 44
End: 2024-04-22
Payer: MEDICARE

## 2024-05-02 ENCOUNTER — PATIENT MESSAGE (OUTPATIENT)
Dept: PSYCHIATRY | Facility: CLINIC | Age: 44
End: 2024-05-02
Payer: MEDICARE

## 2024-05-03 ENCOUNTER — OFFICE VISIT (OUTPATIENT)
Dept: INTERNAL MEDICINE | Facility: CLINIC | Age: 44
End: 2024-05-03
Payer: MEDICARE

## 2024-05-03 DIAGNOSIS — R29.898 WEAKNESS OF BOTH LOWER EXTREMITIES: ICD-10-CM

## 2024-05-03 DIAGNOSIS — G35 PROGRESSIVE MULTIPLE SCLEROSIS: Primary | ICD-10-CM

## 2024-05-03 PROCEDURE — 99213 OFFICE O/P EST LOW 20 MIN: CPT | Mod: 95,,, | Performed by: FAMILY MEDICINE

## 2024-05-03 NOTE — PROGRESS NOTES
Subjective:       Patient ID: Larry Hinds is a 43 y.o. male.    Chief Complaint: No chief complaint on file.    HPI    The patient location is: home  The chief complaint leading to consultation is: leg concerns    Visit type: audiovisual    Face to Face time with patient: 10-20   minutes of total time spent on the encounter, which includes face to face time and non-face to face time preparing to see the patient (eg, review of tests), Obtaining and/or reviewing separately obtained history, Documenting clinical information in the electronic or other health record, Independently interpreting results (not separately reported) and communicating results to the patient/family/caregiver, or Care coordination (not separately reported).         Each patient to whom he or she provides medical services by telemedicine is:  (1) informed of the relationship between the physician and patient and the respective role of any other health care provider with respect to management of the patient; and (2) notified that he or she may decline to receive medical services by telemedicine and may withdraw from such care at any time.    Notes:       Patient Active Problem List   Diagnosis    MS (multiple sclerosis)    Gait disturbance    Weakness of both lower extremities    Unable to ambulate    Quadriparesis    Secondary progressive multiple sclerosis       Past Medical History:   Diagnosis Date    Anxiety     Multiple sclerosis     Transverse myelitis        No past surgical history on file.    Family History   Problem Relation Name Age of Onset    Cancer Mother      Stroke Father      Cancer Father      Glaucoma Father      Cancer Maternal Grandmother      Cancer Maternal Grandfather         Social History     Tobacco Use   Smoking Status Never   Smokeless Tobacco Never       Wt Readings from Last 5 Encounters:   01/06/23 78 kg (172 lb)   07/28/21 75.2 kg (165 lb 12.6 oz)   02/17/21 74.8 kg (165 lb)   09/25/20 78.4 kg (172 lb 13.5 oz)    06/08/20 79.4 kg (175 lb)       For further HPI details, see assessment and plan.    Review of Systems   Constitutional:  Negative for activity change and unexpected weight change.   HENT:  Negative for hearing loss, rhinorrhea and trouble swallowing.    Eyes:  Negative for discharge and visual disturbance.   Respiratory:  Negative for chest tightness and wheezing.    Cardiovascular:  Negative for chest pain and palpitations.   Gastrointestinal:  Negative for blood in stool, constipation, diarrhea and vomiting.   Endocrine: Negative for polydipsia and polyuria.   Genitourinary:  Negative for difficulty urinating, hematuria and urgency.   Musculoskeletal:  Negative for arthralgias, joint swelling and neck pain.   Neurological:  Positive for weakness. Negative for headaches.   Psychiatric/Behavioral:  Negative for confusion and dysphoric mood.        Objective:      There were no vitals filed for this visit.    Physical Exam  Constitutional:       General: He is not in acute distress.     Appearance: He is not ill-appearing.   Pulmonary:      Effort: Pulmonary effort is normal. No respiratory distress.   Neurological:      General: No focal deficit present.      Mental Status: He is alert.   Psychiatric:         Mood and Affect: Mood normal.         Behavior: Behavior normal.         Assessment:       1. Progressive multiple sclerosis    2. Weakness of both lower extremities        Plan:   Progressive multiple sclerosis    Weakness of both lower extremities        Patient presents virtually to discuss potential consultation with Orthopedics.  He is finding that his legs are bending inward and will periodically move unexpectedly.  Discussed I am not certain if Orthopedics is the appropriate avenue for him and feel he maybe better suited to see physical therapy and/ or physiatry.  I have attempted to make phone calls to individuals in both specialities  but it is late Friday afternoon and I was unable to make any  contact.  Plan to inquire upon regular business hours going into next week and will reach back out to patient with recommendation.  We will consult our physical therapy department to see if there is any physical therapist with specialty in neurologic disease if not patient lives immediately adjacent to [facility] physical therapy and has benefitted there in the past and we can arrange for that    Addendum   Spoke with Dr. Gamez regarding his case.  We will arrange consultation     Additionally at present time patient was not under the care of a neurologist.  He is interested in pursuing relationship.  His last neurologist he was not impressed with.  We will place order for neurology as well.    [facility] specialty.  Patient was not interested in pursuing at present time.    This note was verbally dictated, please excuse any type errors.

## 2024-05-15 ENCOUNTER — PATIENT MESSAGE (OUTPATIENT)
Dept: INTERNAL MEDICINE | Facility: CLINIC | Age: 44
End: 2024-05-15
Payer: MEDICARE

## 2024-05-17 ENCOUNTER — PATIENT MESSAGE (OUTPATIENT)
Dept: INTERNAL MEDICINE | Facility: CLINIC | Age: 44
End: 2024-05-17
Payer: MEDICARE

## 2024-05-17 DIAGNOSIS — G35 MULTIPLE SCLEROSIS: Primary | ICD-10-CM

## 2024-06-03 ENCOUNTER — TELEPHONE (OUTPATIENT)
Dept: PAIN MEDICINE | Facility: CLINIC | Age: 44
End: 2024-06-03
Payer: MEDICARE

## 2024-06-03 NOTE — TELEPHONE ENCOUNTER
----- Message from Marylou Hernandez sent at 6/3/2024  8:01 AM CDT -----  Contact: [phone]  Pt is calling regarding appt today. Please call back at [phone] . Thanksdj

## 2024-06-04 ENCOUNTER — PATIENT MESSAGE (OUTPATIENT)
Dept: INTERNAL MEDICINE | Facility: CLINIC | Age: 44
End: 2024-06-04
Payer: MEDICARE

## 2024-07-11 NOTE — PROGRESS NOTES
New Patient Chronic Pain Note (Initial Visit)    Referring Physician: Ciaran Sahni MD    PCP: Ciaran Sahni MD    Chief Complaint:   Chief Complaint   Patient presents with    Hand Pain     Right and left numbeness    Foot Pain     Right and left pins and needles feeling     Leg Pain     Right and left pins and needles feeling     Neck Pain     Back of neck         SUBJECTIVE:    Larry Hinds is a 44 y.o. male with past medical history significant for multiple sclerosis with quadriparesis, anxiety who presents to the clinic for the evaluation of leg weakness and deformity.  Patient reports longstanding history of multiple sclerosis exceeding 10 years in duration with prior diagnosis of transverse myelitis.  Today patient reports discomfort which is rated as a 1/10.  Pain at its worse is a 4/10.  Patient reports pins and needle sensation in bilateral lower extremities to the feet. His primary concern today is that his legs and feet turn inward over the last year.  Patient reports he has been unable to walk over the last 4 years.  He is inquiring regarding orthotic braces or assistive devices which can help.  He does report 2 years prior he was able to perform position transfers and stand using crutches.  Patient has trialed numerous disease modifying agents in the past without benefit.  He is trialed metformin with Dr. Rosado in the past with significant side effects.  Most recently he has gone to [facility] for stem cell treatment with ineffective relief.  Patient has been actively enrolled at [facility] for physical therapy Mondays, Wednesdays and Fridays.  He does report mild improvement in symptoms with physical therapy.  Symptoms are also improved with cold water, massages, stretching as well as rest.      Of note patient saw Dr. Rosado 10/09/2020 with the following evaluation:      Patient's family member in the room reports history of recurrent urinary tract infections where patient received lumbar  MRI in [place].      Patient reports significant motor weakness and loss of sensations.    Patient is scheduled to follow with Dr. John December 2024.    Pain Disability Index Review:         7/15/2024     8:36 AM   Last 3 PDI Scores   Pain Disability Index (PDI) 16       Non-Pharmacologic Treatments:  Physical Therapy/Home Exercise: yes  Ice/Heat:yes  TENS: no  Acupuncture: no  Massage: yes  Chiropractic: no    Other: no      Pain Medications:  Adjuvant medications: Metformin    Pain Procedures:   None    Past Medical History:   Diagnosis Date    Anxiety     Multiple sclerosis     Transverse myelitis      History reviewed. No pertinent surgical history.  Review of patient's allergies indicates:  No Known Allergies    Current Outpatient Medications   Medication Sig    b complex vitamins tablet Take 1 tablet by mouth once daily.    cyanocobalamin (VITAMIN B-12) 250 MCG tablet Take 1 tablet by mouth once daily.    folic acid (FOLVITE) 1 MG tablet Take 1 tablet by mouth once daily.    multivitamin (THERAGRAN) per tablet Take 1 tablet by mouth.    ascorbic acid, vitamin C, (VITAMIN C) 1000 MG tablet Take 1,000 mg by mouth.    BIOTIN ORAL Take by mouth.    cholecalciferol, vitamin D3, 10,000 unit Tab Take by mouth.     coenzyme Q10 10 mg capsule Take 10 mg by mouth once daily.    omega-3 acid ethyl esters (LOVAZA) 1 gram capsule Take 1 g by mouth.    TURMERIC ORAL Take 1 tablet by mouth once daily.     No current facility-administered medications for this visit.         ROS:  GENERAL:  No weight loss, malaise or fevers.  HEENT:   No recent changes in vision or hearing  NECK:  Negative for lumps, no difficulty with swallowing.  RESPIRATORY:  Negative for cough, wheezing or shortness of breath, patient denies any recent URI.  CARDIOVASCULAR:  Negative for chest pain or palpitations.  GI:  Negative for abdominal discomfort, blood in stools or black stools or change in bowel habits.  MUSCULOSKELETAL:  See HPI.  SKIN:   "Negative for lesions, rash, and itching.  PSYCH:  No mood disorder or recent psychosocial stressors.   HEMATOLOGY/LYMPHOLOGY:  Negative for prolonged bleeding, bruising easily or swollen nodes.    NEURO:   No history of syncope, paralysis, seizures or tremors.  All other reviewed and negative other than HPI.    OBJECTIVE:    /72   Pulse (!) 57   Resp 17   Ht 6' 1" (1.854 m)   BMI 22.69 kg/m²       Physical Exam:    GENERAL: Well appearing, in no acute distress, alert and oriented x3.  PSYCH:  Mood and affect appropriate.  SKIN: Skin color, texture, turgor normal, no rashes or lesions.  HEAD/FACE:  Normocephalic, atraumatic. Cranial nerves CN III-XII grossly intact.    NECK:  pain to palpation over the cervical paraspinous muscles. Spurling Negative.  pain with neck flexion, extension, or lateral flexion.   Normaltesting biceps, triceps and brachioradialis bilaterally.    Negative Audelia's bilaterally.    4+/5 strength testing deltoid, biceps, triceps, wrist extensor, wrist flexor and ulnar intrinsics bilaterally.    Reduced  strength bilaterally    CV: RRR with palpation of the radial artery.  PULM: No evidence of respiratory difficulty, symmetric chest rise.  GI:  Soft and non-tender.    EXTREMITIES: Muscle bulk: decreased   LE are strapped in place. Deferred LE strength/reflex exam today.    NEURO: Bilateral upper extremity coordination and muscle stretch reflexes are physiologic and symmetric.     GAIT: unable to stand or ambulate    Imaging:   MRI brain 06/30/2020  FINDINGS:  Intracranial Compartment:     Within the cerebral white matter, there are numerous patchy and focal punctate areas of T2/FLAIR signal hyperintensity, largely situated in the periventricular zones along the margins of the corpus callosum and lateral ventricles radiating outward. Lesions involve the deep, peripheral, and subcortical supratentorial white matter in addition to the juxtacortical white matter.  Involvement of the " mid brain, bilateral middle cerebellar peduncles, and likely the bilateral dentate nuclei is favored to be unchanged.  Overall plaque burden is moderate, unchanged from the prior study.     There is no enhancement or diffusion restriction to suggest active demyelinating plaques.     There is overall mild white matter volume loss.     Skull/Extracranial Contents (limited evaluation): Bone marrow signal intensity is normal.       09/24/20    MRI Cervical Spine W WO Cont    Narrative  EXAMINATION:  MRI CERVICAL SPINE W WO CONTRAST    CLINICAL HISTORY:  Multiple sclerosis, monitor;    TECHNIQUE:  MR Cervical spine with contrast. Sagittal T1, T2, STIR, PD.  Axial T1, T2. Post contrast Sagittal and axial T1.    COMPARISON:  Previous MRI examinations from June 30, 2020 in February 14, 2019.    FINDINGS:  Vertebral body height is normal and alignment is maintained. Marrow signal is within normal limits. Again demonstrated are numerous foci of abnormal T2 signal within the cord substance consistent with demyelinating plaques of multiple sclerosis.  There are no new such lesions.  No abnormal enhancement.  These lie toward the left at the level of C2, throughout the cord from C3-C4 inferiorly to C4-C5, within the right tiffany cord at the level of C5-C6, and within the left tiffany cord at the level of C6-C7. Intervertebral disc levels are as follows:    C2-C3 disc: No significant disc pathology. Normal facet joints. No spinal canal or neural foraminal stenosis.    C3-C4 disc: Minimal disc bulge and minimal left-sided uncovertebral joint degeneration.  No significant spinal stenosis.  Mild left foraminal stenosis.    C4-C5 disc: No significant disc pathology. Normal facet joints. No spinal canal or neural foraminal stenosis.    C5-C6 disc: Minimal disc bulge.  Mild right-sided uncovertebral joint degeneration and hypertrophy.  Mild right foraminal stenosis.  Normal facet joints.  No significant spinal stenosis.  The thecal sac  measures 12 mm.    C6-C7 disc: Tiny, chronic Schmorl's node.  Mild disc bulge.  Normal uncovertebral joints and facet joints.  No significant stenosis.    C7-T1 disc: No significant disc pathology. Normal facet joints. No spinal canal or neural foraminal stenosis.    Impression  1. Again demonstrated are findings consistent with demyelinating plaques of multiple sclerosis.  There are no new lesions within the cervical spinal cord.  No abnormal enhancement.  2. Mild left foraminal stenosis at C3-C4 and mild right foraminal stenosis at C5-C6.        ASSESSMENT: 44 y.o. year old male with     1. Secondary progressive multiple sclerosis        2. Quadriparesis        3. Weakness of both lower extremities            PLAN:   - Interventions:  None at this time.  We have discussed considering cervical medial branch block for axial neck pain versus potential lumbar epidural for component of radiculopathy.  We will 1st review outside lumbar MRI.    - Anticoagulation use: No no anticoagulation     report:  Reviewed and consistent with medication use as prescribed.    - Medications:  -Will defer to Neurology.  Patient is REscheduled with Dr. John 07/16/2024    - Therapy:   We discussed continuing physical therapy to help manage the patient/s painful condition. The patient was counseled that muscle strengthening will improve the long term prognosis in regards to pain and may also help increase range of motion and mobility.  Patient is actively enrolled Monday, Wednesday, Friday at [facility].    - Imaging: Reviewed available imaging (MRI brain, MRI cervical spine, MRI thoracic spine) with patient and answered any questions they had regarding study.  Retrieve MRI lumbar spine from outside [facility].    - Consults/Referrals:      DME:  Orthotic brace consult    - Records: Obtain old records from outside physicians and imaging: MRI lumbar spine    - Follow up visit: PRN      The above plan and management  options were discussed at length with patient. Patient is in agreement with the above and verbalized understanding.    - I discussed the goals of interventional chronic pain management with the patient on today's visit. We discussed a multimodal and systematic approach to pain.  This includes diagnostic and therapeutic injections, adjuvant pharmacologic treatment, physical therapy, and at times psychiatry.  I emphasized the importance of regular exercise, core strengthening and stretching, diet and weight loss as a cornerstone of long-term pain management.    - This condition does not require this patient to take time off of work, and the primary goal of our Pain Management services is to improve the patient's functional capacity.  - Patient Questions: Answered all of the patient's questions regarding diagnoses, therapy, treatment and next steps        Adam Valdez MD  Interventional Pain Management  [facility]    Disclaimer:  This note was prepared using voice recognition system and is likely to have sound alike errors that may have been overlooked even after proof reading.  Please call me with any questions

## 2024-07-15 ENCOUNTER — OFFICE VISIT (OUTPATIENT)
Dept: PAIN MEDICINE | Facility: CLINIC | Age: 44
End: 2024-07-15
Payer: MEDICARE

## 2024-07-15 VITALS
BODY MASS INDEX: 22.69 KG/M2 | DIASTOLIC BLOOD PRESSURE: 72 MMHG | HEIGHT: 73 IN | HEART RATE: 57 BPM | RESPIRATION RATE: 17 BRPM | SYSTOLIC BLOOD PRESSURE: 101 MMHG

## 2024-07-15 DIAGNOSIS — G35 SECONDARY PROGRESSIVE MULTIPLE SCLEROSIS: Primary | ICD-10-CM

## 2024-07-15 DIAGNOSIS — R29.898 WEAKNESS OF BOTH LOWER EXTREMITIES: ICD-10-CM

## 2024-07-15 DIAGNOSIS — G82.50 QUADRIPARESIS: ICD-10-CM

## 2024-07-15 PROCEDURE — 99999 PR PBB SHADOW E&M-EST. PATIENT-LVL IV: CPT | Mod: PBBFAC,,, | Performed by: ANESTHESIOLOGY

## 2024-07-15 PROCEDURE — 99214 OFFICE O/P EST MOD 30 MIN: CPT | Mod: PBBFAC | Performed by: ANESTHESIOLOGY

## 2024-07-15 PROCEDURE — 99204 OFFICE O/P NEW MOD 45 MIN: CPT | Mod: S$PBB,,, | Performed by: ANESTHESIOLOGY

## 2024-07-15 RX ORDER — FOLIC ACID 1 MG/1
1 TABLET ORAL DAILY
COMMUNITY
Start: 2023-07-25

## 2024-07-15 RX ORDER — CYANOCOBALAMIN (VITAMIN B-12) 250 MCG
1 TABLET ORAL DAILY
COMMUNITY
Start: 2023-07-25

## 2024-07-16 ENCOUNTER — OFFICE VISIT (OUTPATIENT)
Dept: NEUROLOGY | Facility: CLINIC | Age: 44
End: 2024-07-16
Payer: MEDICARE

## 2024-07-16 VITALS
OXYGEN SATURATION: 99 % | HEIGHT: 73 IN | BODY MASS INDEX: 22.79 KG/M2 | DIASTOLIC BLOOD PRESSURE: 71 MMHG | HEART RATE: 61 BPM | RESPIRATION RATE: 16 BRPM | SYSTOLIC BLOOD PRESSURE: 106 MMHG | WEIGHT: 171.94 LBS

## 2024-07-16 DIAGNOSIS — G35 PROGRESSIVE MULTIPLE SCLEROSIS: Primary | ICD-10-CM

## 2024-07-16 PROCEDURE — 99214 OFFICE O/P EST MOD 30 MIN: CPT | Mod: PBBFAC | Performed by: PSYCHIATRY & NEUROLOGY

## 2024-07-16 PROCEDURE — 99999 PR PBB SHADOW E&M-EST. PATIENT-LVL IV: CPT | Mod: PBBFAC,,, | Performed by: PSYCHIATRY & NEUROLOGY

## 2024-07-16 PROCEDURE — 99204 OFFICE O/P NEW MOD 45 MIN: CPT | Mod: S$PBB,,, | Performed by: PSYCHIATRY & NEUROLOGY

## 2024-07-16 RX ORDER — BACLOFEN 10 MG/1
TABLET ORAL
COMMUNITY
Start: 2023-07-25 | End: 2024-07-16

## 2024-07-16 NOTE — PROGRESS NOTES
"Subjective:      Patient ID: Larry Hinds is a 44 y.o. male.    Chief Complaint:  No chief complaint on file.    HPI 44 Years old C. Male with PMHx of MS / Legs weakness and others Medical issues   Came with Caregiver for the evaluation and recommendation of MS.      Started: diagnosed around 2014.   Describes: bilateral upper and lower extremities weakness.   Timing: constant.   Frequency: daily.   Pain:  2 to 5/ 10 in general.   Location: CNS.   Family: No MS.   Medications: MVI.   Worsen: Hot weather / humidity.   Alleviated: resting.   Associated symptoms: numbness / tingling / weakness.   Triggers: none.   Prodrome symptoms: none.          Referral by Pain Management.        2009 had a bout of Transverse Myelitis.        Only taking " Natural healing " / Stem cells ( [place] ) / PT program / [facility].      Review of Systems   Neurological:  Positive for weakness and numbness.   All other systems reviewed and are negative.    Objective:     Neurologic Exam     Mental Status   Oriented to person, place, and time.   Registration: recalls 3 of 3 objects. Recall at 5 minutes: recalls 2 of 3 objects.   Attention: normal. Concentration: normal.   Speech: speech is normal   Level of consciousness: alert  Knowledge: good. Able to perform simple calculations.   Able to name object. Able to read. Able to repeat. Able to write. Normal comprehension.     Cranial Nerves   Cranial nerves II through XII intact.     CN III, IV, VI   Pupils are equal, round, and reactive to light.    Motor Exam   Muscle bulk: decreased  Overall muscle tone: increased  Right arm tone: increased  Left arm tone: increased  Right arm pronator drift: present  Left arm pronator drift: present  Right leg tone: increased  Left leg tone: increased    Strength   Right neck flexion: 5/5  Left neck flexion: 5/5  Right neck extension: 5/5  Left neck extension: 5/5  Right deltoid: 4/5  Left deltoid: 4/5  Right biceps: 4/5  Left " biceps: 3/5  Right triceps: 4/5  Left triceps: 3/5  Right wrist flexion: 3/5  Left wrist flexion: 2/5  Right wrist extension: 3/5  Left wrist extension: 3/5  Right interossei: 2/5  Left interossei: 2/5  Right abdominals: 4/5  Left abdominals: 4/5  Right iliopsoas: 3/5  Left iliopsoas: 2/5  Right quadriceps: 3/5  Left quadriceps: 3/5  Right hamstrin/5  Left hamstring: 3/5  Right glutei: 4/5  Left glutei: 3/5  Right anterior tibial: 1  Left anterior tibial: 1  Right posterior tibial: 1  Left posterior tibial:   Right peroneal: 1  Left peroneal: 1  Right gastroc: 1  Left gastroc: 1    Sensory Exam   Right arm light touch: decreased from elbow  Left arm light touch: decreased from elbow  Right arm vibration: normal  Left arm vibration: normal  Right leg vibration: decreased from ankle  Left leg vibration: decreased from ankle  Right arm proprioception: decreased from elbow  Left arm proprioception: decreased from elbow  Pinprick normal.   Graphesthesia: abnormal left  Stereognosis: abnormal left    Gait, Coordination, and Reflexes     Gait  Gait: (No tested due to weakness.)    Coordination   Positive Romberg's test: Unable to do due to weakness.  Finger-nose-finger test: Unable to do due to weakness.  Heel-to-shin test: Unable to do due to weakness.  Tandem gait test: Unable to do due to weakness.    Tremor   Resting tremor: absent  Intention tremor: present  Action tremor: left arm and right arm    Reflexes   Right brachioradialis: 3+  Left brachioradialis: 3+  Right biceps: 3+  Left biceps: 3+  Right triceps: 4+  Left triceps: 3+  Right patellar: 4+  Left patellar: 4+  Right achilles: 4+  Left achilles: 4+  Right : 4+  Left : 4+  Right plantar: upgoing  Left plantar: upgoing  Right ankle clonus: absent  Left ankle clonus: absent      Physical Exam  Vitals and nursing note reviewed.   Constitutional:       Appearance: Normal appearance. He is normal weight.   HENT:      Head: Normocephalic  and atraumatic.      Nose: Nose normal.      Mouth/Throat:      Mouth: Mucous membranes are moist.      Pharynx: Oropharynx is clear.   Eyes:      Extraocular Movements: Extraocular movements intact.      Conjunctiva/sclera: Conjunctivae normal.      Pupils: Pupils are equal, round, and reactive to light.   Cardiovascular:      Rate and Rhythm: Normal rate and regular rhythm.      Pulses: Normal pulses.      Heart sounds: Normal heart sounds.   Pulmonary:      Effort: Pulmonary effort is normal.      Breath sounds: Normal breath sounds.   Abdominal:      General: Abdomen is flat. Bowel sounds are normal.      Palpations: Abdomen is soft.   Genitourinary:     Comments: Deferred.   Musculoskeletal:         General: Deformity present.      Cervical back: Normal range of motion.      Right lower leg: Edema present.      Left lower leg: Edema present.   Skin:     General: Skin is warm and dry.      Capillary Refill: Capillary refill takes less than 2 seconds.   Neurological:      Mental Status: He is alert and oriented to person, place, and time. Mental status is at baseline.      Cranial Nerves: Cranial nerves 2-12 are intact.      Coordination: Finger-nose-finger test: Unable to do due to weakness. Heel-to-shin test: Unable to do due to weakness. Positive Romberg's test: Unable to do due to weakness.      Gait: Tandem gait test: Unable to do due to weakness.      Deep Tendon Reflexes:      Reflex Scores:       Tricep reflexes are 4+ on the right side and 3+ on the left side.       Bicep reflexes are 3+ on the right side and 3+ on the left side.       Brachioradialis reflexes are 3+ on the right side and 3+ on the left side.       Patellar reflexes are 4+ on the right side and 4+ on the left side.       Achilles reflexes are 4+ on the right side and 4+ on the left side.  Psychiatric:         Mood and Affect: Mood normal.         Speech: Speech normal.         Behavior: Behavior normal.         Thought Content: Thought  "content normal.         Judgment: Judgment normal.          Assessment:   44 Years old C. Male with PMHX as above came of the evaluation of MS.  - Progressive MS.  - Incomplete Quadriplegic.     Plan:   Patient Neurological Assessment is remarkable for incomplete Quadriplegia - C 3 - 4.   He was diagnosed by a MS specialist ( [facility] ) - progressive Ds at this moment.   He does not want to take any medications - more than " natural healing " approach as he is doing.   He indicated is " looking for a cure " of my disability / wants treatment to " reverse all of these " pointing to his body.   We told Patient MS lesions does reverse one they are set up and the physical Neuro deficits are also permanent - some mild deficits can be overcome with intense   Physical therapy - he is already in a program.     Labs: RPR / HIV / Folate / Vit B 12 / CBC / CMP / Hep B and C / TSH / ESR - 2020 / 2024 - non significant abnormalities / non reactive.     JCV - positive.     Personally reviewed MRI Brain / Cervical spine / Thoracic spine - 2020 - no acute changes / MS plaque as previous seem.     Please do not hesitate to contact me with any updates, questions or concerns.    Rishi John MD.  General Neurologist.   "

## 2024-07-25 ENCOUNTER — PATIENT MESSAGE (OUTPATIENT)
Dept: PSYCHIATRY | Facility: CLINIC | Age: 44
End: 2024-07-25
Payer: MEDICARE

## 2024-08-05 ENCOUNTER — PATIENT MESSAGE (OUTPATIENT)
Dept: PSYCHIATRY | Facility: CLINIC | Age: 44
End: 2024-08-05
Payer: MEDICARE

## 2024-12-16 ENCOUNTER — PATIENT MESSAGE (OUTPATIENT)
Dept: PSYCHIATRY | Facility: CLINIC | Age: 44
End: 2024-12-16
Payer: MEDICARE

## 2025-01-13 DIAGNOSIS — Z00.00 ENCOUNTER FOR MEDICARE ANNUAL WELLNESS EXAM: ICD-10-CM

## 2025-03-07 ENCOUNTER — PATIENT MESSAGE (OUTPATIENT)
Dept: PSYCHIATRY | Facility: CLINIC | Age: 45
End: 2025-03-07
Payer: MEDICARE

## 2025-05-13 ENCOUNTER — PATIENT MESSAGE (OUTPATIENT)
Dept: PSYCHIATRY | Facility: CLINIC | Age: 45
End: 2025-05-13
Payer: MEDICARE

## 2025-06-19 ENCOUNTER — PATIENT MESSAGE (OUTPATIENT)
Dept: PSYCHIATRY | Facility: CLINIC | Age: 45
End: 2025-06-19
Payer: MEDICARE

## 2025-07-30 ENCOUNTER — PATIENT MESSAGE (OUTPATIENT)
Dept: PSYCHIATRY | Facility: CLINIC | Age: 45
End: 2025-07-30
Payer: MEDICARE

## 2025-08-01 ENCOUNTER — PATIENT MESSAGE (OUTPATIENT)
Dept: PSYCHIATRY | Facility: CLINIC | Age: 45
End: 2025-08-01
Payer: MEDICARE

## 2025-09-03 ENCOUNTER — PATIENT OUTREACH (OUTPATIENT)
Dept: ADMINISTRATIVE | Facility: HOSPITAL | Age: 45
End: 2025-09-03
Payer: MEDICARE